# Patient Record
Sex: MALE | Race: WHITE | NOT HISPANIC OR LATINO | Employment: OTHER | URBAN - METROPOLITAN AREA
[De-identification: names, ages, dates, MRNs, and addresses within clinical notes are randomized per-mention and may not be internally consistent; named-entity substitution may affect disease eponyms.]

---

## 2018-02-13 DIAGNOSIS — N40.1 BENIGN PROSTATIC HYPERPLASIA WITH NOCTURIA: Primary | ICD-10-CM

## 2018-02-13 DIAGNOSIS — R35.1 BENIGN PROSTATIC HYPERPLASIA WITH NOCTURIA: Primary | ICD-10-CM

## 2018-02-19 ENCOUNTER — OFFICE VISIT (OUTPATIENT)
Dept: UROLOGY | Facility: MEDICAL CENTER | Age: 68
End: 2018-02-19
Payer: MEDICARE

## 2018-02-19 VITALS
BODY MASS INDEX: 36.45 KG/M2 | DIASTOLIC BLOOD PRESSURE: 82 MMHG | SYSTOLIC BLOOD PRESSURE: 140 MMHG | WEIGHT: 275 LBS | HEIGHT: 73 IN

## 2018-02-19 DIAGNOSIS — R35.1 BENIGN PROSTATIC HYPERPLASIA WITH NOCTURIA: Primary | ICD-10-CM

## 2018-02-19 DIAGNOSIS — N40.1 BENIGN PROSTATIC HYPERPLASIA WITH NOCTURIA: Primary | ICD-10-CM

## 2018-02-19 LAB
SL AMB  POCT GLUCOSE, UA: NEGATIVE
SL AMB LEUKOCYTE ESTERASE,UA: NEGATIVE
SL AMB POCT BILIRUBIN,UA: NEGATIVE
SL AMB POCT BLOOD,UA: NEGATIVE
SL AMB POCT CLARITY,UA: CLEAR
SL AMB POCT COLOR,UA: YELLOW
SL AMB POCT KETONES,UA: NEGATIVE
SL AMB POCT NITRITE,UA: NEGATIVE
SL AMB POCT PH,UA: 6
SL AMB POCT SPECIFIC GRAVITY,UA: 1.01
SL AMB POCT URINE PROTEIN: NEGATIVE
SL AMB POCT UROBILINOGEN: 0.2

## 2018-02-19 PROCEDURE — 81003 URINALYSIS AUTO W/O SCOPE: CPT | Performed by: UROLOGY

## 2018-02-19 PROCEDURE — 99214 OFFICE O/P EST MOD 30 MIN: CPT | Performed by: UROLOGY

## 2018-02-19 RX ORDER — PANTOPRAZOLE SODIUM 40 MG/1
TABLET, DELAYED RELEASE ORAL DAILY
COMMUNITY
Start: 2018-02-09 | End: 2020-06-04 | Stop reason: ALTCHOICE

## 2018-02-19 RX ORDER — FUROSEMIDE 20 MG/1
20 TABLET ORAL DAILY
COMMUNITY

## 2018-02-19 RX ORDER — SITAGLIPTIN 100 MG/1
TABLET, FILM COATED ORAL DAILY
COMMUNITY
Start: 2018-02-09 | End: 2020-06-04 | Stop reason: ALTCHOICE

## 2018-02-19 RX ORDER — METOPROLOL SUCCINATE 50 MG/1
TABLET, EXTENDED RELEASE ORAL DAILY
COMMUNITY
Start: 2018-02-09 | End: 2021-06-09 | Stop reason: ALTCHOICE

## 2018-02-19 RX ORDER — ASPIRIN 325 MG
325 TABLET ORAL
COMMUNITY
End: 2021-06-09 | Stop reason: ALTCHOICE

## 2018-02-19 RX ORDER — ATORVASTATIN CALCIUM 40 MG/1
TABLET, FILM COATED ORAL DAILY
COMMUNITY
Start: 2017-09-08

## 2018-02-19 NOTE — PROGRESS NOTES
Assessment/Plan:    Assessment:  1  Benign prostatic hyperplasia with nocturia    Plan:  1  Await PSA  2  Return in 1 year, PSA then    No problem-specific Assessment & Plan notes found for this encounter  Diagnoses and all orders for this visit:    Benign prostatic hyperplasia with nocturia  -     POCT urine dip auto non-scope  -     PSA; Future    Other orders  -     pantoprazole (PROTONIX) 40 mg tablet; daily  -     JANUVIA 100 MG tablet; daily  -     metFORMIN (GLUCOPHAGE) 500 mg tablet; 2 (two) times a day  -     OLMESARTAN-AMLODIPINE-HCTZ PO; Take by mouth daily  -     furosemide (LASIX) 20 mg tablet; Take 20 mg by mouth daily  -     metoprolol succinate (TOPROL-XL) 50 mg 24 hr tablet; daily  -     atorvastatin (LIPITOR) 40 mg tablet; daily  -     ADVAIR DISKUS 250-50 MCG/DOSE inhaler; daily  -     ipratropium-albuterol (COMBIVENT RESPIMAT) inhaler; as needed  -     aspirin 325 mg tablet; Take 325 mg by mouth          Subjective:      Patient ID: Susan Cano is a 75 y o  male  HPI      He notes urinary frequency, nocturia x 3 He denies other significant urinary symptoms  He denies gross hematuria, urinary tract infections or incontinence  He is taking no prescription medications for his symptoms  Nocturia got much better within the past year, then reverted to three times per night  Non insulin dependent diabetes without apparent effect on the urinary tract  The following portions of the patient's history were reviewed and updated as appropriate: allergies, current medications, past family history, past medical history, past social history, past surgical history and problem list     Review of Systems   Constitutional: Negative for activity change and fatigue  Diabetic  Weight gain noted  Respiratory: Negative for shortness of breath and wheezing  Cardiovascular: Negative for chest pain  Gastrointestinal: Negative for abdominal pain     Genitourinary: Negative for difficulty urinating, dysuria, frequency, hematuria and urgency  Musculoskeletal: Negative for back pain and gait problem  Skin: Negative  Allergic/Immunologic: Negative  Neurological: Negative  Psychiatric/Behavioral: Negative  Objective:      /82 (BP Location: Left arm, Patient Position: Sitting, Cuff Size: Standard)   Ht 6' 0 5" (1 842 m)   Wt 125 kg (275 lb)   BMI 36 78 kg/m²          Physical Exam   Constitutional: He is oriented to person, place, and time  He appears well-developed and well-nourished  HENT:   Head: Normocephalic and atraumatic  Pulmonary/Chest: Effort normal    Abdominal: Soft  Bowel sounds are normal    Genitourinary:   Genitourinary Comments: The prostate is approximately 30-40 g in size  Exam was difficult due to a tight anal sphincter and the size of the patient's buttocks  Anal sphincter tone is hyperactive  Perineal structures are normal    Musculoskeletal: Normal range of motion  Neurological: He is alert and oriented to person, place, and time  Skin: Skin is warm and dry  Psychiatric: He has a normal mood and affect   His behavior is normal  Judgment and thought content normal

## 2018-02-19 NOTE — PROGRESS NOTES
IPSS Questionnaire (AUA-7): Over the past month    1)  How often have you had a sensation of not emptying your bladder completely after you finish urinating? 0 - Not at all   2)  How often have you had to urinate again less than two hours after you finished urinating? 0 - Not at all   3)  How often have you found you stopped and started again several times when you urinated? 0 - Not at all   4) How difficult have you found it to postpone urination? 2 - Less than half the time   5) How often have you had a weak urinary stream?  3 - About half the time   6) How often have you had to push or strain to begin urination? 0 - Not at all   7) How many times did you most typically get up to urinate from the time you went to bed until the time you got up in the morning?   3 - 3 times   Total Score:  8

## 2018-02-19 NOTE — LETTER
February 19, 2018     MD EDGAR [facility] Calls  [address]  [facility] [address]    Patient: Katherine Anderson   YOB: 1950   Date of Visit: 2/19/2018       Dear Dr Ivan Horner: Thank you for referring Oxana Fox to me for evaluation  Below are my notes for this consultation  If you have questions, please do not hesitate to call me  I look forward to following your patient along with you  Sincerely,        John Weber MD        CC: No Recipients  John Weber MD  2/19/2018 10:29 AM  Sign at close encounter  Assessment/Plan:    Assessment:  1  Benign prostatic hyperplasia with nocturia    Plan:  1  Await PSA  2  Return in 1 year, PSA then    No problem-specific Assessment & Plan notes found for this encounter  Diagnoses and all orders for this visit:    Benign prostatic hyperplasia with nocturia  -     POCT urine dip auto non-scope  -     PSA; Future    Other orders  -     pantoprazole (PROTONIX) 40 mg tablet; daily  -     JANUVIA 100 MG tablet; daily  -     metFORMIN (GLUCOPHAGE) 500 mg tablet; 2 (two) times a day  -     OLMESARTAN-AMLODIPINE-HCTZ PO; Take by mouth daily  -     furosemide (LASIX) 20 mg tablet; Take 20 mg by mouth daily  -     metoprolol succinate (TOPROL-XL) 50 mg 24 hr tablet; daily  -     atorvastatin (LIPITOR) 40 mg tablet; daily  -     ADVAIR DISKUS 250-50 MCG/DOSE inhaler; daily  -     ipratropium-albuterol (COMBIVENT RESPIMAT) inhaler; as needed  -     aspirin 325 mg tablet; Take 325 mg by mouth          Subjective:      Patient ID: Katherine Anderson is a 75 y o  male  HPI      He notes urinary frequency, nocturia x 3 He denies other significant urinary symptoms  He denies gross hematuria, urinary tract infections or incontinence  He is taking no prescription medications for his symptoms  Nocturia got much better within the past year, then reverted to three times per night           Non insulin dependent diabetes without apparent effect on the urinary tract  The following portions of the patient's history were reviewed and updated as appropriate: allergies, current medications, past family history, past medical history, past social history, past surgical history and problem list     Review of Systems   Constitutional: Negative for activity change and fatigue  Diabetic  Weight gain noted  Respiratory: Negative for shortness of breath and wheezing  Cardiovascular: Negative for chest pain  Gastrointestinal: Negative for abdominal pain  Genitourinary: Negative for difficulty urinating, dysuria, frequency, hematuria and urgency  Musculoskeletal: Negative for back pain and gait problem  Skin: Negative  Allergic/Immunologic: Negative  Neurological: Negative  Psychiatric/Behavioral: Negative  Objective:      /82 (BP Location: Left arm, Patient Position: Sitting, Cuff Size: Standard)   Ht 6' 0 5" (1 842 m)   Wt 125 kg (275 lb)   BMI 36 78 kg/m²           Physical Exam   Constitutional: He is oriented to person, place, and time  He appears well-developed and well-nourished  HENT:   Head: Normocephalic and atraumatic  Pulmonary/Chest: Effort normal    Abdominal: Soft  Bowel sounds are normal    Genitourinary:   Genitourinary Comments: The prostate is approximately 30-40 g in size  Exam was difficult due to a tight anal sphincter and the size of the patient's buttocks  Anal sphincter tone is hyperactive  Perineal structures are normal    Musculoskeletal: Normal range of motion  Neurological: He is alert and oriented to person, place, and time  Skin: Skin is warm and dry  Psychiatric: He has a normal mood and affect   His behavior is normal  Judgment and thought content normal

## 2019-02-25 DIAGNOSIS — N40.1 BENIGN PROSTATIC HYPERPLASIA WITH NOCTURIA: Primary | ICD-10-CM

## 2019-02-25 DIAGNOSIS — R35.1 BENIGN PROSTATIC HYPERPLASIA WITH NOCTURIA: Primary | ICD-10-CM

## 2019-03-01 RX ORDER — NICOTINE POLACRILEX 4 MG/1
20 GUM, CHEWING ORAL
COMMUNITY
End: 2021-06-09 | Stop reason: ALTCHOICE

## 2019-03-01 RX ORDER — ALLOPURINOL 100 MG/1
TABLET ORAL
COMMUNITY
Start: 2017-04-26 | End: 2019-03-14

## 2019-03-14 ENCOUNTER — OFFICE VISIT (OUTPATIENT)
Dept: UROLOGY | Facility: MEDICAL CENTER | Age: 69
End: 2019-03-14
Payer: MEDICARE

## 2019-03-14 VITALS
HEART RATE: 55 BPM | HEIGHT: 73 IN | BODY MASS INDEX: 36.92 KG/M2 | WEIGHT: 278.6 LBS | DIASTOLIC BLOOD PRESSURE: 86 MMHG | SYSTOLIC BLOOD PRESSURE: 158 MMHG

## 2019-03-14 DIAGNOSIS — R35.1 BENIGN PROSTATIC HYPERPLASIA WITH NOCTURIA: Primary | ICD-10-CM

## 2019-03-14 DIAGNOSIS — N40.1 BENIGN PROSTATIC HYPERPLASIA WITH NOCTURIA: Primary | ICD-10-CM

## 2019-03-14 LAB
SL AMB  POCT GLUCOSE, UA: NEGATIVE
SL AMB LEUKOCYTE ESTERASE,UA: NEGATIVE
SL AMB POCT BILIRUBIN,UA: NEGATIVE
SL AMB POCT BLOOD,UA: NEGATIVE
SL AMB POCT CLARITY,UA: CLEAR
SL AMB POCT COLOR,UA: YELLOW
SL AMB POCT KETONES,UA: NEGATIVE
SL AMB POCT NITRITE,UA: NEGATIVE
SL AMB POCT PH,UA: 6.5
SL AMB POCT SPECIFIC GRAVITY,UA: 1.01
SL AMB POCT URINE PROTEIN: NEGATIVE
SL AMB POCT UROBILINOGEN: 1

## 2019-03-14 PROCEDURE — 99214 OFFICE O/P EST MOD 30 MIN: CPT | Performed by: UROLOGY

## 2019-03-14 PROCEDURE — 81003 URINALYSIS AUTO W/O SCOPE: CPT | Performed by: UROLOGY

## 2019-03-14 NOTE — PROGRESS NOTES
Assessment/Plan:    Benign prostatic hyperplasia with nocturia  Mildly symptomatic  Return in 1 year  Diagnoses and all orders for this visit:    Benign prostatic hyperplasia with nocturia  -     POCT urine dip auto non-scope  -     PSA, Total Screen; Future    Other orders  -     multivitamin-minerals (CENTRUM) tablet; Take 1 tablet by mouth daily          Subjective:      Patient ID: Vernida Nageotte is a 70 y o  male  HPI  BPH:  He notes incomplete emptying  He denies other significant urinary symptoms  He denies gross hematuria, urinary tract infections or incontinence  He is taking neither medications nor supplements for his symptoms  For the last few dyas, stream has been weaker  PSA:  0 7 on February 27, 2019  AUA SYMPTOM SCORE      Most Recent Value   AUA SYMPTOM SCORE   How often have you had a sensation of not emptying your bladder completely after you finished urinating? 5   How often have you had to urinate again less than two hours after you finished urinating? 1   How often have you found you stopped and started again several times when you urinate?  0   How often have you found it difficult to postpone urination? 0   How often have you had a weak urinary stream?  1   How often have you had to push or strain to begin urination? 0   How many times did you most typically get up to urinate from the time you went to bed at night until the time you got up in the morning?  0   Quality of Life: If you were to spend the rest of your life with your urinary condition just the way it is now, how would you feel about that?  1   AUA SYMPTOM SCORE  7          The following portions of the patient's history were reviewed and updated as appropriate: allergies, current medications, past family history, past medical history, past social history, past surgical history and problem list     Review of Systems   Constitutional: Negative for activity change and fatigue     Respiratory: Negative for shortness of breath and wheezing  Asthma on inhalers daily  Cardiovascular: Negative for chest pain  Hypertension  Gastrointestinal: Negative for abdominal pain  Endocrine:        NIDDM  Control has been good recently  Checks sugars 3-4 times per day  Genitourinary: Negative for difficulty urinating, dysuria, frequency, hematuria and urgency  Musculoskeletal: Negative for back pain and gait problem  Skin: Negative  Allergic/Immunologic: Negative  Neurological: Negative  Psychiatric/Behavioral: Negative  Objective:      /86 (BP Location: Left arm, Patient Position: Sitting, Cuff Size: Standard)   Pulse 55   Ht 6' 0 5" (1 842 m)   Wt 126 kg (278 lb 9 6 oz)   BMI 37 27 kg/m²          Physical Exam   Constitutional: He is oriented to person, place, and time  He appears well-developed and well-nourished  HENT:   Head: Normocephalic and atraumatic  Eyes: EOM are normal    Neck: Normal range of motion  Neck supple  Pulmonary/Chest: Effort normal    Genitourinary: Rectum normal    Genitourinary Comments: The prostate is 30 gm, firm, smooth, non-tender  Pt had difficulty relaxing buttocks, limiting exam     Musculoskeletal: Normal range of motion  Neurological: He is alert and oriented to person, place, and time  Skin: Skin is warm and dry  Psychiatric: He has a normal mood and affect   His behavior is normal  Judgment and thought content normal

## 2019-06-12 ENCOUNTER — HOSPITAL ENCOUNTER (INPATIENT)
Facility: HOSPITAL | Age: 69
LOS: 4 days | Discharge: HOME | DRG: 603 | End: 2019-06-16
Attending: PODIATRIST | Admitting: PODIATRIST
Payer: MEDICARE

## 2019-06-12 DIAGNOSIS — L03.116 CELLULITIS OF LEFT LOWER EXTREMITY: Primary | ICD-10-CM

## 2019-06-12 DIAGNOSIS — E11.9 TYPE 2 DIABETES MELLITUS WITHOUT COMPLICATION, WITHOUT LONG-TERM CURRENT USE OF INSULIN (HCC): ICD-10-CM

## 2019-06-12 PROBLEM — E11.610 CHARCOT FOOT DUE TO DIABETES MELLITUS (HCC): Status: ACTIVE | Noted: 2019-06-12

## 2019-06-12 PROBLEM — K21.9 GERD (GASTROESOPHAGEAL REFLUX DISEASE): Status: ACTIVE | Noted: 2019-06-12

## 2019-06-12 PROBLEM — E78.5 HLD (HYPERLIPIDEMIA): Status: ACTIVE | Noted: 2019-06-12

## 2019-06-12 PROBLEM — I10 HTN (HYPERTENSION): Status: ACTIVE | Noted: 2019-06-12

## 2019-06-12 PROBLEM — M10.9 GOUT INVOLVING TOE OF LEFT FOOT: Status: ACTIVE | Noted: 2019-06-12

## 2019-06-12 LAB
ALBUMIN SERPL BCP-MCNC: 4 G/DL (ref 3.5–5)
ALP SERPL-CCNC: 101 U/L (ref 46–116)
ALT SERPL W P-5'-P-CCNC: 30 U/L (ref 12–78)
ANION GAP SERPL CALCULATED.3IONS-SCNC: 13 MMOL/L (ref 4–13)
AST SERPL W P-5'-P-CCNC: 19 U/L (ref 5–45)
BASOPHILS # BLD AUTO: 0.05 THOUSANDS/ΜL (ref 0–0.1)
BASOPHILS NFR BLD AUTO: 0 % (ref 0–1)
BILIRUB SERPL-MCNC: 0.8 MG/DL (ref 0.2–1)
BUN SERPL-MCNC: 16 MG/DL (ref 5–25)
CALCIUM SERPL-MCNC: 9.5 MG/DL (ref 8.3–10.1)
CHLORIDE SERPL-SCNC: 104 MMOL/L (ref 100–108)
CO2 SERPL-SCNC: 28 MMOL/L (ref 21–32)
CREAT SERPL-MCNC: 1.16 MG/DL (ref 0.6–1.3)
CRP SERPL QL: 7.3 MG/L
EOSINOPHIL # BLD AUTO: 0.11 THOUSAND/ΜL (ref 0–0.61)
EOSINOPHIL NFR BLD AUTO: 1 % (ref 0–6)
ERYTHROCYTE [DISTWIDTH] IN BLOOD BY AUTOMATED COUNT: 13 % (ref 11.6–15.1)
ERYTHROCYTE [SEDIMENTATION RATE] IN BLOOD: 12 MM/HOUR (ref 0–10)
GFR SERPL CREATININE-BSD FRML MDRD: 64 ML/MIN/1.73SQ M
GLUCOSE SERPL-MCNC: 116 MG/DL (ref 65–140)
GLUCOSE SERPL-MCNC: 138 MG/DL (ref 65–140)
GLUCOSE SERPL-MCNC: 140 MG/DL (ref 65–140)
GLUCOSE SERPL-MCNC: 146 MG/DL (ref 65–140)
HCT VFR BLD AUTO: 42.1 % (ref 36.5–49.3)
HGB BLD-MCNC: 14.2 G/DL (ref 12–17)
IMM GRANULOCYTES # BLD AUTO: 0.05 THOUSAND/UL (ref 0–0.2)
IMM GRANULOCYTES NFR BLD AUTO: 0 % (ref 0–2)
LYMPHOCYTES # BLD AUTO: 4.41 THOUSANDS/ΜL (ref 0.6–4.47)
LYMPHOCYTES NFR BLD AUTO: 33 % (ref 14–44)
MCH RBC QN AUTO: 30 PG (ref 26.8–34.3)
MCHC RBC AUTO-ENTMCNC: 33.7 G/DL (ref 31.4–37.4)
MCV RBC AUTO: 89 FL (ref 82–98)
MONOCYTES # BLD AUTO: 0.78 THOUSAND/ΜL (ref 0.17–1.22)
MONOCYTES NFR BLD AUTO: 6 % (ref 4–12)
NEUTROPHILS # BLD AUTO: 7.79 THOUSANDS/ΜL (ref 1.85–7.62)
NEUTS SEG NFR BLD AUTO: 60 % (ref 43–75)
NRBC BLD AUTO-RTO: 0 /100 WBCS
PLATELET # BLD AUTO: 244 THOUSANDS/UL (ref 149–390)
PMV BLD AUTO: 11 FL (ref 8.9–12.7)
POTASSIUM SERPL-SCNC: 3.7 MMOL/L (ref 3.5–5.3)
PROT SERPL-MCNC: 7.4 G/DL (ref 6.4–8.2)
RBC # BLD AUTO: 4.74 MILLION/UL (ref 3.88–5.62)
SODIUM SERPL-SCNC: 145 MMOL/L (ref 136–145)
URATE SERPL-MCNC: 7.5 MG/DL (ref 4.2–8)
WBC # BLD AUTO: 13.19 THOUSAND/UL (ref 4.31–10.16)

## 2019-06-12 PROCEDURE — 86140 C-REACTIVE PROTEIN: CPT | Performed by: PODIATRIST

## 2019-06-12 PROCEDURE — 80053 COMPREHEN METABOLIC PANEL: CPT | Performed by: PODIATRIST

## 2019-06-12 PROCEDURE — 85652 RBC SED RATE AUTOMATED: CPT | Performed by: PODIATRIST

## 2019-06-12 PROCEDURE — 82948 REAGENT STRIP/BLOOD GLUCOSE: CPT

## 2019-06-12 PROCEDURE — 83036 HEMOGLOBIN GLYCOSYLATED A1C: CPT | Performed by: NURSE PRACTITIONER

## 2019-06-12 PROCEDURE — 84550 ASSAY OF BLOOD/URIC ACID: CPT | Performed by: PODIATRIST

## 2019-06-12 PROCEDURE — 85025 COMPLETE CBC W/AUTO DIFF WBC: CPT | Performed by: PODIATRIST

## 2019-06-12 PROCEDURE — 87040 BLOOD CULTURE FOR BACTERIA: CPT | Performed by: PODIATRIST

## 2019-06-12 RX ORDER — FUROSEMIDE 20 MG/1
20 TABLET ORAL DAILY
Status: DISCONTINUED | OUTPATIENT
Start: 2019-06-12 | End: 2019-06-16 | Stop reason: HOSPADM

## 2019-06-12 RX ORDER — METOPROLOL SUCCINATE 50 MG/1
50 TABLET, EXTENDED RELEASE ORAL DAILY
Status: DISCONTINUED | OUTPATIENT
Start: 2019-06-12 | End: 2019-06-16 | Stop reason: HOSPADM

## 2019-06-12 RX ORDER — AMLODIPINE AND VALSARTAN 10; 160 MG/1; MG/1
1 TABLET ORAL DAILY
Status: DISCONTINUED | OUTPATIENT
Start: 2019-06-12 | End: 2019-06-12 | Stop reason: CLARIF

## 2019-06-12 RX ORDER — ATORVASTATIN CALCIUM 40 MG/1
40 TABLET, FILM COATED ORAL
Status: DISCONTINUED | OUTPATIENT
Start: 2019-06-12 | End: 2019-06-16 | Stop reason: HOSPADM

## 2019-06-12 RX ORDER — CEFAZOLIN SODIUM 2 G/50ML
2000 SOLUTION INTRAVENOUS EVERY 8 HOURS
Status: DISCONTINUED | OUTPATIENT
Start: 2019-06-12 | End: 2019-06-16 | Stop reason: HOSPADM

## 2019-06-12 RX ORDER — METRONIDAZOLE 500 MG/1
500 TABLET ORAL EVERY 8 HOURS SCHEDULED
Status: DISCONTINUED | OUTPATIENT
Start: 2019-06-12 | End: 2019-06-13

## 2019-06-12 RX ORDER — IPRATROPIUM BROMIDE AND ALBUTEROL SULFATE 2.5; .5 MG/3ML; MG/3ML
3 SOLUTION RESPIRATORY (INHALATION) EVERY 4 HOURS PRN
Status: DISCONTINUED | OUTPATIENT
Start: 2019-06-12 | End: 2019-06-16 | Stop reason: HOSPADM

## 2019-06-12 RX ORDER — PANTOPRAZOLE SODIUM 40 MG/1
40 TABLET, DELAYED RELEASE ORAL
Status: DISCONTINUED | OUTPATIENT
Start: 2019-06-13 | End: 2019-06-16 | Stop reason: HOSPADM

## 2019-06-12 RX ORDER — AMLODIPINE BESYLATE 10 MG/1
10 TABLET ORAL DAILY
Status: DISCONTINUED | OUTPATIENT
Start: 2019-06-12 | End: 2019-06-16 | Stop reason: HOSPADM

## 2019-06-12 RX ORDER — ACETAMINOPHEN 325 MG/1
650 TABLET ORAL EVERY 6 HOURS PRN
Status: DISCONTINUED | OUTPATIENT
Start: 2019-06-12 | End: 2019-06-16 | Stop reason: HOSPADM

## 2019-06-12 RX ORDER — PANTOPRAZOLE SODIUM 20 MG/1
20 TABLET, DELAYED RELEASE ORAL
Status: DISCONTINUED | OUTPATIENT
Start: 2019-06-12 | End: 2019-06-12

## 2019-06-12 RX ORDER — LOSARTAN POTASSIUM 50 MG/1
100 TABLET ORAL DAILY
Status: DISCONTINUED | OUTPATIENT
Start: 2019-06-12 | End: 2019-06-16 | Stop reason: HOSPADM

## 2019-06-12 RX ORDER — FLUTICASONE FUROATE AND VILANTEROL 100; 25 UG/1; UG/1
1 POWDER RESPIRATORY (INHALATION)
Status: DISCONTINUED | OUTPATIENT
Start: 2019-06-12 | End: 2019-06-16 | Stop reason: HOSPADM

## 2019-06-12 RX ADMIN — METOPROLOL SUCCINATE 50 MG: 50 TABLET, EXTENDED RELEASE ORAL at 17:55

## 2019-06-12 RX ADMIN — CEFAZOLIN SODIUM 2000 MG: 2 SOLUTION INTRAVENOUS at 12:54

## 2019-06-12 RX ADMIN — METRONIDAZOLE 500 MG: 500 TABLET, FILM COATED ORAL at 14:54

## 2019-06-12 RX ADMIN — METRONIDAZOLE 500 MG: 500 TABLET, FILM COATED ORAL at 22:10

## 2019-06-12 RX ADMIN — CEFAZOLIN SODIUM 2000 MG: 2 SOLUTION INTRAVENOUS at 20:14

## 2019-06-12 RX ADMIN — ATORVASTATIN CALCIUM 40 MG: 40 TABLET, FILM COATED ORAL at 17:55

## 2019-06-13 PROBLEM — J45.20 MILD INTERMITTENT ASTHMA WITHOUT COMPLICATION: Status: ACTIVE | Noted: 2019-06-13

## 2019-06-13 PROBLEM — I25.10 CORONARY ARTERY DISEASE INVOLVING NATIVE CORONARY ARTERY WITHOUT ANGINA PECTORIS: Status: ACTIVE | Noted: 2019-06-13

## 2019-06-13 LAB
EST. AVERAGE GLUCOSE BLD GHB EST-MCNC: 134 MG/DL
GLUCOSE SERPL-MCNC: 107 MG/DL (ref 65–140)
GLUCOSE SERPL-MCNC: 140 MG/DL (ref 65–140)
GLUCOSE SERPL-MCNC: 144 MG/DL (ref 65–140)
GLUCOSE SERPL-MCNC: 165 MG/DL (ref 65–140)
GLUCOSE SERPL-MCNC: 187 MG/DL (ref 65–140)
GLUCOSE SERPL-MCNC: 191 MG/DL (ref 65–140)
HBA1C MFR BLD: 6.3 % (ref 4.2–6.3)

## 2019-06-13 PROCEDURE — 99222 1ST HOSP IP/OBS MODERATE 55: CPT | Performed by: INTERNAL MEDICINE

## 2019-06-13 PROCEDURE — 99221 1ST HOSP IP/OBS SF/LOW 40: CPT | Performed by: FAMILY MEDICINE

## 2019-06-13 PROCEDURE — 82948 REAGENT STRIP/BLOOD GLUCOSE: CPT

## 2019-06-13 RX ORDER — ASPIRIN 325 MG
325 TABLET ORAL DAILY
Status: DISCONTINUED | OUTPATIENT
Start: 2019-06-13 | End: 2019-06-16 | Stop reason: HOSPADM

## 2019-06-13 RX ADMIN — FUROSEMIDE 20 MG: 20 TABLET ORAL at 09:52

## 2019-06-13 RX ADMIN — METOPROLOL SUCCINATE 50 MG: 50 TABLET, EXTENDED RELEASE ORAL at 17:34

## 2019-06-13 RX ADMIN — PANTOPRAZOLE SODIUM 40 MG: 40 TABLET, DELAYED RELEASE ORAL at 06:15

## 2019-06-13 RX ADMIN — CEFAZOLIN SODIUM 2000 MG: 2 SOLUTION INTRAVENOUS at 11:56

## 2019-06-13 RX ADMIN — CEFAZOLIN SODIUM 2000 MG: 2 SOLUTION INTRAVENOUS at 04:20

## 2019-06-13 RX ADMIN — ATORVASTATIN CALCIUM 40 MG: 40 TABLET, FILM COATED ORAL at 17:34

## 2019-06-13 RX ADMIN — METFORMIN HYDROCHLORIDE 500 MG: 500 TABLET ORAL at 17:34

## 2019-06-13 RX ADMIN — FLUTICASONE FUROATE AND VILANTEROL TRIFENATATE 1 PUFF: 100; 25 POWDER RESPIRATORY (INHALATION) at 09:52

## 2019-06-13 RX ADMIN — AMLODIPINE BESYLATE 10 MG: 10 TABLET ORAL at 09:52

## 2019-06-13 RX ADMIN — LOSARTAN POTASSIUM 100 MG: 50 TABLET, FILM COATED ORAL at 09:52

## 2019-06-13 RX ADMIN — INSULIN LISPRO 2 UNITS: 100 INJECTION, SOLUTION INTRAVENOUS; SUBCUTANEOUS at 12:01

## 2019-06-13 RX ADMIN — ASPIRIN 325 MG ORAL TABLET 325 MG: 325 PILL ORAL at 11:59

## 2019-06-13 RX ADMIN — CEFAZOLIN SODIUM 2000 MG: 2 SOLUTION INTRAVENOUS at 20:02

## 2019-06-13 RX ADMIN — Medication 1 TABLET: at 09:52

## 2019-06-13 RX ADMIN — METRONIDAZOLE 500 MG: 500 TABLET, FILM COATED ORAL at 06:15

## 2019-06-14 LAB
ANION GAP SERPL CALCULATED.3IONS-SCNC: 14 MMOL/L (ref 4–13)
BASOPHILS # BLD AUTO: 0.04 THOUSANDS/ΜL (ref 0–0.1)
BASOPHILS NFR BLD AUTO: 0 % (ref 0–1)
BUN SERPL-MCNC: 18 MG/DL (ref 5–25)
CALCIUM SERPL-MCNC: 9.1 MG/DL (ref 8.3–10.1)
CHLORIDE SERPL-SCNC: 105 MMOL/L (ref 100–108)
CO2 SERPL-SCNC: 25 MMOL/L (ref 21–32)
CREAT SERPL-MCNC: 1.2 MG/DL (ref 0.6–1.3)
EOSINOPHIL # BLD AUTO: 0.11 THOUSAND/ΜL (ref 0–0.61)
EOSINOPHIL NFR BLD AUTO: 1 % (ref 0–6)
ERYTHROCYTE [DISTWIDTH] IN BLOOD BY AUTOMATED COUNT: 13.1 % (ref 11.6–15.1)
GFR SERPL CREATININE-BSD FRML MDRD: 61 ML/MIN/1.73SQ M
GLUCOSE SERPL-MCNC: 118 MG/DL (ref 65–140)
GLUCOSE SERPL-MCNC: 133 MG/DL (ref 65–140)
GLUCOSE SERPL-MCNC: 134 MG/DL (ref 65–140)
GLUCOSE SERPL-MCNC: 155 MG/DL (ref 65–140)
GLUCOSE SERPL-MCNC: 222 MG/DL (ref 65–140)
HCT VFR BLD AUTO: 41.1 % (ref 36.5–49.3)
HGB BLD-MCNC: 14 G/DL (ref 12–17)
IMM GRANULOCYTES # BLD AUTO: 0.05 THOUSAND/UL (ref 0–0.2)
IMM GRANULOCYTES NFR BLD AUTO: 1 % (ref 0–2)
LYMPHOCYTES # BLD AUTO: 3.84 THOUSANDS/ΜL (ref 0.6–4.47)
LYMPHOCYTES NFR BLD AUTO: 37 % (ref 14–44)
MCH RBC QN AUTO: 30.5 PG (ref 26.8–34.3)
MCHC RBC AUTO-ENTMCNC: 34.1 G/DL (ref 31.4–37.4)
MCV RBC AUTO: 90 FL (ref 82–98)
MONOCYTES # BLD AUTO: 0.71 THOUSAND/ΜL (ref 0.17–1.22)
MONOCYTES NFR BLD AUTO: 7 % (ref 4–12)
NEUTROPHILS # BLD AUTO: 5.68 THOUSANDS/ΜL (ref 1.85–7.62)
NEUTS SEG NFR BLD AUTO: 54 % (ref 43–75)
NRBC BLD AUTO-RTO: 0 /100 WBCS
PLATELET # BLD AUTO: 217 THOUSANDS/UL (ref 149–390)
PMV BLD AUTO: 10.8 FL (ref 8.9–12.7)
POTASSIUM SERPL-SCNC: 3.3 MMOL/L (ref 3.5–5.3)
RBC # BLD AUTO: 4.59 MILLION/UL (ref 3.88–5.62)
SODIUM SERPL-SCNC: 144 MMOL/L (ref 136–145)
WBC # BLD AUTO: 10.43 THOUSAND/UL (ref 4.31–10.16)

## 2019-06-14 PROCEDURE — 82948 REAGENT STRIP/BLOOD GLUCOSE: CPT

## 2019-06-14 PROCEDURE — 80048 BASIC METABOLIC PNL TOTAL CA: CPT | Performed by: NURSE PRACTITIONER

## 2019-06-14 PROCEDURE — 85025 COMPLETE CBC W/AUTO DIFF WBC: CPT | Performed by: NURSE PRACTITIONER

## 2019-06-14 PROCEDURE — 99232 SBSQ HOSP IP/OBS MODERATE 35: CPT | Performed by: INTERNAL MEDICINE

## 2019-06-14 RX ADMIN — INSULIN LISPRO 1 UNITS: 100 INJECTION, SOLUTION INTRAVENOUS; SUBCUTANEOUS at 21:41

## 2019-06-14 RX ADMIN — Medication 1 TABLET: at 08:05

## 2019-06-14 RX ADMIN — ATORVASTATIN CALCIUM 40 MG: 40 TABLET, FILM COATED ORAL at 17:42

## 2019-06-14 RX ADMIN — PANTOPRAZOLE SODIUM 40 MG: 40 TABLET, DELAYED RELEASE ORAL at 06:13

## 2019-06-14 RX ADMIN — METFORMIN HYDROCHLORIDE 500 MG: 500 TABLET ORAL at 17:42

## 2019-06-14 RX ADMIN — ASPIRIN 325 MG ORAL TABLET 325 MG: 325 PILL ORAL at 08:05

## 2019-06-14 RX ADMIN — AMLODIPINE BESYLATE 10 MG: 10 TABLET ORAL at 08:05

## 2019-06-14 RX ADMIN — INSULIN LISPRO 4 UNITS: 100 INJECTION, SOLUTION INTRAVENOUS; SUBCUTANEOUS at 11:42

## 2019-06-14 RX ADMIN — FUROSEMIDE 20 MG: 20 TABLET ORAL at 08:05

## 2019-06-14 RX ADMIN — METOPROLOL SUCCINATE 50 MG: 50 TABLET, EXTENDED RELEASE ORAL at 17:43

## 2019-06-14 RX ADMIN — LOSARTAN POTASSIUM 100 MG: 50 TABLET, FILM COATED ORAL at 08:05

## 2019-06-14 RX ADMIN — FLUTICASONE FUROATE AND VILANTEROL TRIFENATATE 1 PUFF: 100; 25 POWDER RESPIRATORY (INHALATION) at 08:05

## 2019-06-14 RX ADMIN — CEFAZOLIN SODIUM 2000 MG: 2 SOLUTION INTRAVENOUS at 04:36

## 2019-06-14 RX ADMIN — CEFAZOLIN SODIUM 2000 MG: 2 SOLUTION INTRAVENOUS at 11:42

## 2019-06-14 RX ADMIN — CEFAZOLIN SODIUM 2000 MG: 2 SOLUTION INTRAVENOUS at 20:27

## 2019-06-14 RX ADMIN — METFORMIN HYDROCHLORIDE 500 MG: 500 TABLET ORAL at 08:05

## 2019-06-15 LAB
ERYTHROCYTE [DISTWIDTH] IN BLOOD BY AUTOMATED COUNT: 13.1 % (ref 11.6–15.1)
GLUCOSE SERPL-MCNC: 118 MG/DL (ref 65–140)
GLUCOSE SERPL-MCNC: 132 MG/DL (ref 65–140)
GLUCOSE SERPL-MCNC: 177 MG/DL (ref 65–140)
GLUCOSE SERPL-MCNC: 183 MG/DL (ref 65–140)
HCT VFR BLD AUTO: 39.4 % (ref 36.5–49.3)
HGB BLD-MCNC: 13.3 G/DL (ref 12–17)
MCH RBC QN AUTO: 30.2 PG (ref 26.8–34.3)
MCHC RBC AUTO-ENTMCNC: 33.8 G/DL (ref 31.4–37.4)
MCV RBC AUTO: 90 FL (ref 82–98)
PLATELET # BLD AUTO: 236 THOUSANDS/UL (ref 149–390)
PMV BLD AUTO: 11 FL (ref 8.9–12.7)
RBC # BLD AUTO: 4.4 MILLION/UL (ref 3.88–5.62)
WBC # BLD AUTO: 10.83 THOUSAND/UL (ref 4.31–10.16)

## 2019-06-15 PROCEDURE — 82948 REAGENT STRIP/BLOOD GLUCOSE: CPT

## 2019-06-15 PROCEDURE — 85027 COMPLETE CBC AUTOMATED: CPT | Performed by: STUDENT IN AN ORGANIZED HEALTH CARE EDUCATION/TRAINING PROGRAM

## 2019-06-15 PROCEDURE — 99232 SBSQ HOSP IP/OBS MODERATE 35: CPT | Performed by: INTERNAL MEDICINE

## 2019-06-15 RX ADMIN — ATORVASTATIN CALCIUM 40 MG: 40 TABLET, FILM COATED ORAL at 16:34

## 2019-06-15 RX ADMIN — CEFAZOLIN SODIUM 2000 MG: 2 SOLUTION INTRAVENOUS at 19:57

## 2019-06-15 RX ADMIN — INSULIN LISPRO 2 UNITS: 100 INJECTION, SOLUTION INTRAVENOUS; SUBCUTANEOUS at 11:16

## 2019-06-15 RX ADMIN — FUROSEMIDE 20 MG: 20 TABLET ORAL at 08:37

## 2019-06-15 RX ADMIN — AMLODIPINE BESYLATE 10 MG: 10 TABLET ORAL at 08:37

## 2019-06-15 RX ADMIN — INSULIN LISPRO 1 UNITS: 100 INJECTION, SOLUTION INTRAVENOUS; SUBCUTANEOUS at 21:09

## 2019-06-15 RX ADMIN — PANTOPRAZOLE SODIUM 40 MG: 40 TABLET, DELAYED RELEASE ORAL at 05:02

## 2019-06-15 RX ADMIN — METFORMIN HYDROCHLORIDE 500 MG: 500 TABLET ORAL at 16:34

## 2019-06-15 RX ADMIN — CEFAZOLIN SODIUM 2000 MG: 2 SOLUTION INTRAVENOUS at 04:59

## 2019-06-15 RX ADMIN — METOPROLOL SUCCINATE 50 MG: 50 TABLET, EXTENDED RELEASE ORAL at 16:34

## 2019-06-15 RX ADMIN — LOSARTAN POTASSIUM 100 MG: 50 TABLET, FILM COATED ORAL at 08:37

## 2019-06-15 RX ADMIN — FLUTICASONE FUROATE AND VILANTEROL TRIFENATATE 1 PUFF: 100; 25 POWDER RESPIRATORY (INHALATION) at 08:37

## 2019-06-15 RX ADMIN — CEFAZOLIN SODIUM 2000 MG: 2 SOLUTION INTRAVENOUS at 11:15

## 2019-06-15 RX ADMIN — METFORMIN HYDROCHLORIDE 500 MG: 500 TABLET ORAL at 08:37

## 2019-06-15 RX ADMIN — Medication 1 TABLET: at 08:37

## 2019-06-15 RX ADMIN — ASPIRIN 325 MG ORAL TABLET 325 MG: 325 PILL ORAL at 08:37

## 2019-06-16 VITALS
TEMPERATURE: 98.2 F | SYSTOLIC BLOOD PRESSURE: 130 MMHG | DIASTOLIC BLOOD PRESSURE: 86 MMHG | HEART RATE: 66 BPM | RESPIRATION RATE: 18 BRPM | OXYGEN SATURATION: 96 %

## 2019-06-16 LAB
BASOPHILS # BLD AUTO: 0.03 THOUSANDS/ΜL (ref 0–0.1)
BASOPHILS NFR BLD AUTO: 0 % (ref 0–1)
EOSINOPHIL # BLD AUTO: 0.12 THOUSAND/ΜL (ref 0–0.61)
EOSINOPHIL NFR BLD AUTO: 1 % (ref 0–6)
ERYTHROCYTE [DISTWIDTH] IN BLOOD BY AUTOMATED COUNT: 13.3 % (ref 11.6–15.1)
GLUCOSE SERPL-MCNC: 134 MG/DL (ref 65–140)
GLUCOSE SERPL-MCNC: 147 MG/DL (ref 65–140)
HCT VFR BLD AUTO: 42 % (ref 36.5–49.3)
HGB BLD-MCNC: 13.9 G/DL (ref 12–17)
IMM GRANULOCYTES # BLD AUTO: 0.06 THOUSAND/UL (ref 0–0.2)
IMM GRANULOCYTES NFR BLD AUTO: 1 % (ref 0–2)
LYMPHOCYTES # BLD AUTO: 3.57 THOUSANDS/ΜL (ref 0.6–4.47)
LYMPHOCYTES NFR BLD AUTO: 29 % (ref 14–44)
MCH RBC QN AUTO: 30 PG (ref 26.8–34.3)
MCHC RBC AUTO-ENTMCNC: 33.1 G/DL (ref 31.4–37.4)
MCV RBC AUTO: 91 FL (ref 82–98)
MONOCYTES # BLD AUTO: 0.83 THOUSAND/ΜL (ref 0.17–1.22)
MONOCYTES NFR BLD AUTO: 7 % (ref 4–12)
NEUTROPHILS # BLD AUTO: 7.9 THOUSANDS/ΜL (ref 1.85–7.62)
NEUTS SEG NFR BLD AUTO: 62 % (ref 43–75)
NRBC BLD AUTO-RTO: 0 /100 WBCS
PLATELET # BLD AUTO: 215 THOUSANDS/UL (ref 149–390)
PMV BLD AUTO: 10.8 FL (ref 8.9–12.7)
RBC # BLD AUTO: 4.63 MILLION/UL (ref 3.88–5.62)
WBC # BLD AUTO: 12.51 THOUSAND/UL (ref 4.31–10.16)

## 2019-06-16 PROCEDURE — 82948 REAGENT STRIP/BLOOD GLUCOSE: CPT

## 2019-06-16 PROCEDURE — 99232 SBSQ HOSP IP/OBS MODERATE 35: CPT | Performed by: INTERNAL MEDICINE

## 2019-06-16 PROCEDURE — 85025 COMPLETE CBC W/AUTO DIFF WBC: CPT | Performed by: INTERNAL MEDICINE

## 2019-06-16 RX ORDER — CEPHALEXIN 500 MG/1
500 CAPSULE ORAL EVERY 6 HOURS SCHEDULED
Qty: 8 CAPSULE | Refills: 0 | Status: SHIPPED | OUTPATIENT
Start: 2019-06-16 | End: 2019-06-18

## 2019-06-16 RX ADMIN — FUROSEMIDE 20 MG: 20 TABLET ORAL at 08:22

## 2019-06-16 RX ADMIN — CEFAZOLIN SODIUM 2000 MG: 2 SOLUTION INTRAVENOUS at 05:00

## 2019-06-16 RX ADMIN — PANTOPRAZOLE SODIUM 40 MG: 40 TABLET, DELAYED RELEASE ORAL at 05:04

## 2019-06-16 RX ADMIN — AMLODIPINE BESYLATE 10 MG: 10 TABLET ORAL at 08:22

## 2019-06-16 RX ADMIN — LOSARTAN POTASSIUM 100 MG: 50 TABLET, FILM COATED ORAL at 08:22

## 2019-06-16 RX ADMIN — CEFAZOLIN SODIUM 2000 MG: 2 SOLUTION INTRAVENOUS at 11:11

## 2019-06-16 RX ADMIN — Medication 1 TABLET: at 08:22

## 2019-06-16 RX ADMIN — FLUTICASONE FUROATE AND VILANTEROL TRIFENATATE 1 PUFF: 100; 25 POWDER RESPIRATORY (INHALATION) at 08:22

## 2019-06-16 RX ADMIN — ASPIRIN 325 MG ORAL TABLET 325 MG: 325 PILL ORAL at 08:22

## 2019-06-16 RX ADMIN — METFORMIN HYDROCHLORIDE 500 MG: 500 TABLET ORAL at 08:22

## 2019-06-17 LAB
BACTERIA BLD CULT: NORMAL
BACTERIA BLD CULT: NORMAL

## 2019-06-25 ENCOUNTER — EPISODE CHANGES (OUTPATIENT)
Dept: CASE MANAGEMENT | Facility: HOSPITAL | Age: 69
End: 2019-06-25

## 2019-06-27 ENCOUNTER — PATIENT OUTREACH (OUTPATIENT)
Dept: CASE MANAGEMENT | Facility: OTHER | Age: 69
End: 2019-06-27

## 2019-09-13 ENCOUNTER — PATIENT OUTREACH (OUTPATIENT)
Dept: CASE MANAGEMENT | Facility: OTHER | Age: 69
End: 2019-09-13

## 2020-04-02 ENCOUNTER — TELEPHONE (OUTPATIENT)
Dept: UROLOGY | Facility: MEDICAL CENTER | Age: 70
End: 2020-04-02

## 2020-06-01 RX ORDER — OLMESARTAN MEDOXOMIL 40 MG/1
TABLET ORAL
COMMUNITY

## 2020-06-04 ENCOUNTER — OFFICE VISIT (OUTPATIENT)
Dept: UROLOGY | Facility: MEDICAL CENTER | Age: 70
End: 2020-06-04
Payer: MEDICARE

## 2020-06-04 VITALS
DIASTOLIC BLOOD PRESSURE: 80 MMHG | WEIGHT: 259 LBS | HEIGHT: 73 IN | BODY MASS INDEX: 34.33 KG/M2 | HEART RATE: 81 BPM | TEMPERATURE: 97.1 F | SYSTOLIC BLOOD PRESSURE: 130 MMHG

## 2020-06-04 DIAGNOSIS — N40.1 BENIGN PROSTATIC HYPERPLASIA WITH NOCTURIA: Primary | ICD-10-CM

## 2020-06-04 DIAGNOSIS — R35.1 BENIGN PROSTATIC HYPERPLASIA WITH NOCTURIA: Primary | ICD-10-CM

## 2020-06-04 LAB
SL AMB  POCT GLUCOSE, UA: ABNORMAL
SL AMB LEUKOCYTE ESTERASE,UA: ABNORMAL
SL AMB POCT BILIRUBIN,UA: ABNORMAL
SL AMB POCT BLOOD,UA: ABNORMAL
SL AMB POCT CLARITY,UA: CLEAR
SL AMB POCT COLOR,UA: YELLOW
SL AMB POCT KETONES,UA: ABNORMAL
SL AMB POCT NITRITE,UA: ABNORMAL
SL AMB POCT PH,UA: 7
SL AMB POCT SPECIFIC GRAVITY,UA: 1.02
SL AMB POCT URINE PROTEIN: ABNORMAL
SL AMB POCT UROBILINOGEN: 0.2

## 2020-06-04 PROCEDURE — 99214 OFFICE O/P EST MOD 30 MIN: CPT | Performed by: UROLOGY

## 2020-06-04 PROCEDURE — 81003 URINALYSIS AUTO W/O SCOPE: CPT | Performed by: UROLOGY

## 2021-03-10 DIAGNOSIS — Z23 ENCOUNTER FOR IMMUNIZATION: ICD-10-CM

## 2021-06-07 RX ORDER — COLCHICINE 0.6 MG/1
0.6 TABLET ORAL 2 TIMES DAILY
COMMUNITY
Start: 2020-11-25 | End: 2021-06-09 | Stop reason: ALTCHOICE

## 2021-06-07 RX ORDER — PANTOPRAZOLE SODIUM 40 MG/1
40 TABLET, DELAYED RELEASE ORAL DAILY
COMMUNITY

## 2021-06-09 ENCOUNTER — OFFICE VISIT (OUTPATIENT)
Dept: UROLOGY | Facility: MEDICAL CENTER | Age: 71
End: 2021-06-09
Payer: MEDICARE

## 2021-06-09 VITALS
WEIGHT: 265 LBS | HEART RATE: 82 BPM | BODY MASS INDEX: 35.12 KG/M2 | SYSTOLIC BLOOD PRESSURE: 140 MMHG | DIASTOLIC BLOOD PRESSURE: 80 MMHG | HEIGHT: 73 IN

## 2021-06-09 DIAGNOSIS — N40.1 BENIGN PROSTATIC HYPERPLASIA WITH NOCTURIA: Primary | ICD-10-CM

## 2021-06-09 DIAGNOSIS — R35.1 BENIGN PROSTATIC HYPERPLASIA WITH NOCTURIA: Primary | ICD-10-CM

## 2021-06-09 DIAGNOSIS — Z12.5 SPECIAL SCREENING FOR MALIGNANT NEOPLASM OF PROSTATE: ICD-10-CM

## 2021-06-09 LAB
SL AMB  POCT GLUCOSE, UA: NORMAL
SL AMB LEUKOCYTE ESTERASE,UA: NORMAL
SL AMB POCT BILIRUBIN,UA: NORMAL
SL AMB POCT BLOOD,UA: NORMAL
SL AMB POCT CLARITY,UA: CLEAR
SL AMB POCT COLOR,UA: YELLOW
SL AMB POCT KETONES,UA: NORMAL
SL AMB POCT NITRITE,UA: NORMAL
SL AMB POCT PH,UA: 5
SL AMB POCT SPECIFIC GRAVITY,UA: 1.01
SL AMB POCT URINE PROTEIN: NORMAL
SL AMB POCT UROBILINOGEN: 0.2

## 2021-06-09 PROCEDURE — 99214 OFFICE O/P EST MOD 30 MIN: CPT | Performed by: UROLOGY

## 2021-06-09 PROCEDURE — 81003 URINALYSIS AUTO W/O SCOPE: CPT | Performed by: UROLOGY

## 2021-06-09 RX ORDER — METOPROLOL SUCCINATE AND HYDROCHLOROTHIAZIDE 12.5; 1 MG/1; MG/1
TABLET ORAL
COMMUNITY

## 2021-06-09 RX ORDER — TRIAMCINOLONE ACETONIDE 1 MG/G
CREAM TOPICAL
COMMUNITY
Start: 2021-03-29

## 2021-06-09 NOTE — PROGRESS NOTES
Assessment/Plan:    No problem-specific Assessment & Plan notes found for this encounter  Diagnoses and all orders for this visit:    Benign prostatic hyperplasia with nocturia  -     POCT urine dip auto non-scope    Other orders  -     triamcinolone (KENALOG) 0 1 % cream; APPLY TOPICALLY TWO TIMES A DAY A THIN LAYER TO THE AFFECTED AREA AS NEEDED          Subjective:      Patient ID: Kassi Reyes is a 69 y o  male  HPI  BPH:  He notes no significant urinary symptons  He denies other significant urinary symptoms  He denies gross hematuria, urinary tract infections or incontinence  He is taking neither medications nor supplements for his symptoms  Notes slowing of his stream but it is still adequate  PSA:  0 82 on June 4, 2021  AUA SYMPTOM SCORE      Most Recent Value   AUA SYMPTOM SCORE   How often have you had a sensation of not emptying your bladder completely after you finished urinating? 1   How often have you had to urinate again less than two hours after you finished urinating? 0   How often have you found you stopped and started again several times when you urinate?  0   How often have you found it difficult to postpone urination? 0   How often have you had a weak urinary stream?  1   How often have you had to push or strain to begin urination? 1   How many times did you most typically get up to urinate from the time you went to bed at night until the time you got up in the morning? 2   Quality of Life: If you were to spend the rest of your life with your urinary condition just the way it is now, how would you feel about that?  2   AUA SYMPTOM SCORE  5          The following portions of the patient's history were reviewed and updated as appropriate: allergies, current medications, past family history, past medical history, past social history, past surgical history and problem list     Review of Systems    Constitutional: Negative for activity change and fatigue     Respiratory: Negative for shortness of breath and wheezing  Asthma on inhalers   Cardiovascular: Negative for chest pain  Hypertension  The patient is anticoagulated due to coronary artery disease and arhythmia (atrial fib) due to viral pericarditis in 2020  Gastrointestinal: Negative for abdominal pain  Endocrine:        Non-insulin dependent diabetes  Generally good control  Genitourinary: Negative for difficulty urinating, dysuria, frequency, hematuria and urgency  Musculoskeletal: Negative for back pain and gait problem  Skin: Negative  Allergic/Immunologic: Negative  Neurological: Negative  Hematological:        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 in 1% of pts  Psychiatric/Behavioral: Negative  Objective:      /80   Pulse 82   Ht 6' 1" (1 854 m)   Wt 120 kg (265 lb)   BMI 34 96 kg/m²          Physical Exam  Constitutional:       Appearance: He is well-developed  HENT:      Head: Normocephalic and atraumatic  Neck:      Musculoskeletal: Normal range of motion and neck supple  Pulmonary:      Effort: Pulmonary effort is normal    Genitourinary:     Rectum: Normal       Comments: The prostate is 30 gm, firm, smooth, non-tender  Musculoskeletal: Normal range of motion  Skin:     General: Skin is warm and dry  Neurological:      Mental Status: He is alert and oriented to person, place, and time  Psychiatric:         Behavior: Behavior normal          Thought Content:  Thought content normal          Judgment: Judgment normal

## 2022-06-07 ENCOUNTER — TELEPHONE (OUTPATIENT)
Dept: UROLOGY | Facility: AMBULATORY SURGERY CENTER | Age: 72
End: 2022-06-07

## 2022-06-07 DIAGNOSIS — N40.1 BENIGN PROSTATIC HYPERPLASIA WITH NOCTURIA: Primary | ICD-10-CM

## 2022-06-07 DIAGNOSIS — R35.1 BENIGN PROSTATIC HYPERPLASIA WITH NOCTURIA: Primary | ICD-10-CM

## 2022-06-07 NOTE — TELEPHONE ENCOUNTER
Called patient - LMOM that we updated his PSA script  He is asked to call back if he has any further questions or concerns

## 2022-06-07 NOTE — TELEPHONE ENCOUNTER
Pt was a [facility] patient and he needs an update PSA script placed in his chart   The pt's appointment is 06/20/22    Pt can be reached at [phone]

## 2022-08-15 ENCOUNTER — OFFICE VISIT (OUTPATIENT)
Dept: UROLOGY | Facility: MEDICAL CENTER | Age: 72
End: 2022-08-15
Payer: MEDICARE

## 2022-08-15 VITALS
DIASTOLIC BLOOD PRESSURE: 80 MMHG | SYSTOLIC BLOOD PRESSURE: 120 MMHG | HEART RATE: 80 BPM | HEIGHT: 72 IN | BODY MASS INDEX: 36.3 KG/M2 | WEIGHT: 268 LBS

## 2022-08-15 DIAGNOSIS — N13.8 BPH WITH URINARY OBSTRUCTION: Primary | ICD-10-CM

## 2022-08-15 DIAGNOSIS — N40.1 BPH WITH URINARY OBSTRUCTION: Primary | ICD-10-CM

## 2022-08-15 PROCEDURE — 99213 OFFICE O/P EST LOW 20 MIN: CPT | Performed by: UROLOGY

## 2022-08-15 RX ORDER — DULAGLUTIDE 0.75 MG/.5ML
INJECTION, SOLUTION SUBCUTANEOUS
COMMUNITY
Start: 2022-07-08

## 2022-08-15 RX ORDER — METOPROLOL SUCCINATE 50 MG/1
TABLET, EXTENDED RELEASE ORAL
COMMUNITY
Start: 2022-07-25

## 2022-08-15 RX ORDER — AMLODIPINE BESYLATE 2.5 MG/1
TABLET ORAL
COMMUNITY
Start: 2022-08-09

## 2022-08-15 RX ORDER — APIXABAN 5 MG/1
TABLET, FILM COATED ORAL
COMMUNITY
Start: 2022-07-25

## 2022-08-15 NOTE — PROGRESS NOTES
HISTORY:    Follow-up BPH, no real change in mild symptoms  Daytime flow is slower but not a problem at all  Nocturia times 0-2 or three, he is not sure if there is any correlation to fluid intake    No hematuria infections stones    PSA  0 7         ASSESSMENT / PLAN:    Doing well  We discussed his mild symptoms, it is not bothersome know if to take anything    Follow-up one year    The following portions of the patient's history were reviewed and updated as appropriate: allergies, current medications, past family history, past medical history, past social history, past surgical history and problem list     Review of Systems   All other systems reviewed and are negative  Objective:     Physical Exam  Constitutional:       General: He is not in acute distress  Appearance: He is well-developed  He is not diaphoretic  HENT:      Head: Normocephalic and atraumatic  Eyes:      General: No scleral icterus  Pulmonary:      Effort: Pulmonary effort is normal    Genitourinary:     Comments: Penis testes normal    Prostate mildly to moderately enlarged no nodules  Skin:     Coloration: Skin is not pale  Neurological:      Mental Status: He is alert and oriented to person, place, and time  Psychiatric:         Behavior: Behavior normal          Thought Content:  Thought content normal          Judgment: Judgment normal            No results found for: PSA]  BUN   Date Value Ref Range Status   06/14/2019 18 5 - 25 mg/dL Final     Creatinine   Date Value Ref Range Status   06/14/2019 1 20 0 60 - 1 30 mg/dL Final     Comment:     Standardized to IDMS reference method     No components found for: CBC      Patient Active Problem List   Diagnosis    Benign prostatic hyperplasia with nocturia    Cellulitis of left lower extremity    Charcot foot due to diabetes mellitus ([facility] [address] )    HTN (hypertension)    HLD (hyperlipidemia)    GERD (gastroesophageal reflux disease)    Gout involving toe of left foot    Type 2 diabetes mellitus without complication (HCC)    Mild intermittent asthma without complication    Coronary artery disease involving native coronary artery without angina pectoris        Diagnoses and all orders for this visit:    BPH with urinary obstruction  -     PSA Total, Diagnostic; Future    Other orders  -     amLODIPine (NORVASC) 2 5 mg tablet; amlodipine 2 5 mg tablet   TAKE ONE TABLET BY MOUTH EVERY DAY  -     Eliquis 5 MG  -     Dulaglutide (Trulicity) 7 83 QA/3 9ZG SOPN; Trulicity 4 14 VE/5 7 mL subcutaneous pen injector  -     metoprolol succinate (TOPROL-XL) 50 mg 24 hr tablet           Patient ID: Mauricio Gabriel is a 66 y o  male        Current Outpatient Medications:     ADVAIR DISKUS 250-50 MCG/DOSE inhaler, daily, Disp: , Rfl:     atorvastatin (LIPITOR) 40 mg tablet, daily, Disp: , Rfl:     furosemide (LASIX) 20 mg tablet, Take 20 mg by mouth daily, Disp: , Rfl:     ipratropium-albuterol (COMBIVENT RESPIMAT) inhaler, as needed, Disp: , Rfl:     metFORMIN (GLUCOPHAGE) 500 mg tablet, 2 (two) times a day, Disp: , Rfl:     Metoprolol-HCTZ -12 5 MG TB24, , Disp: , Rfl:     multivitamin-minerals (CENTRUM) tablet, Take 1 tablet by mouth daily, Disp: , Rfl:     olmesartan (BENICAR) 40 mg tablet, olmesartan 40 mg tablet, Disp: , Rfl:     pantoprazole (PROTONIX) 40 mg tablet, Take 40 mg by mouth daily, Disp: , Rfl:     triamcinolone (KENALOG) 0 1 % cream, APPLY TOPICALLY TWO TIMES A DAY A THIN LAYER TO THE AFFECTED AREA AS NEEDED, Disp: , Rfl:     Past Medical History:   Diagnosis Date    Asthma     BPH (benign prostatic hyperplasia)     DM (diabetes mellitus), type 2 ([address] )     Hearing loss     Hypertension     Nocturia        Past Surgical History:   Procedure Laterality Date    EYE SURGERY      KNEE SURGERY      TONSILLECTOMY         Social History

## 2023-10-17 ENCOUNTER — HOSPITAL ENCOUNTER (OUTPATIENT)
Dept: NON INVASIVE DIAGNOSTICS | Facility: CLINIC | Age: 73
Discharge: HOME | End: 2023-10-17
Payer: MEDICARE

## 2023-10-17 DIAGNOSIS — M79.672 LEFT FOOT PAIN: ICD-10-CM

## 2023-10-17 PROCEDURE — 93923 UPR/LXTR ART STDY 3+ LVLS: CPT

## 2023-10-28 ENCOUNTER — HOSPITAL ENCOUNTER (EMERGENCY)
Facility: HOSPITAL | Age: 73
Discharge: HOME | End: 2023-10-28
Attending: EMERGENCY MEDICINE | Admitting: EMERGENCY MEDICINE
Payer: MEDICARE

## 2023-10-28 ENCOUNTER — APPOINTMENT (EMERGENCY)
Dept: CT IMAGING | Facility: HOSPITAL | Age: 73
End: 2023-10-28
Payer: MEDICARE

## 2023-10-28 VITALS
BODY MASS INDEX: 35.7 KG/M2 | HEART RATE: 75 BPM | TEMPERATURE: 97.9 F | SYSTOLIC BLOOD PRESSURE: 172 MMHG | DIASTOLIC BLOOD PRESSURE: 75 MMHG | OXYGEN SATURATION: 97 % | WEIGHT: 263.23 LBS | RESPIRATION RATE: 16 BRPM

## 2023-10-28 DIAGNOSIS — M48.00 SPINAL STENOSIS: ICD-10-CM

## 2023-10-28 DIAGNOSIS — M47.819 SPINAL ARTHRITIS: ICD-10-CM

## 2023-10-28 DIAGNOSIS — M54.50 ACUTE RIGHT-SIDED LOW BACK PAIN WITHOUT SCIATICA: Primary | ICD-10-CM

## 2023-10-28 PROCEDURE — 99283 EMERGENCY DEPT VISIT LOW MDM: CPT

## 2023-10-28 PROCEDURE — 72131 CT LUMBAR SPINE W/O DYE: CPT

## 2023-10-28 PROCEDURE — G1004 CDSM NDSC: HCPCS

## 2023-10-28 PROCEDURE — 99284 EMERGENCY DEPT VISIT MOD MDM: CPT | Performed by: EMERGENCY MEDICINE

## 2023-10-28 NOTE — ED PROVIDER NOTES
History  Chief Complaint   Patient presents with    Back Pain     Pt reports lower back pain beginning a week ago. Reports chronic back pain due to falling off carla mountain climbing 50 years ago. Denies urinary or GI symptoms. Back Pain    Complaining of low back pain going on for about a week or per his wife may be a little longer that centered around the right SI joint. It gets worse with certain types of movement. If remaining still and gets in a position of comfort it does not really hurt. He did notice a little tingling or numbness in the toes on the right but this does not radiate down his leg per se. No weakness. He is able to ambulate but normally does not ambulate a whole lot because of other joint issues. There is no inciting trauma however per the wife they went on a trip to [place] few weeks ago to go fly fishing where they stood in a river for some time but again there was no obvious acute trauma. There is been no fever. He has no bowel or bladder complaints. There is no abdominal pain. He has a history of having a fall off a carla sustaining a vertebral fracture about 50 years ago and never had that worked on. He also has a history of about 20 years ago potentially needing surgery on his back due to radicular issues but then apparently that resolved and he never had surgery. Prior to Admission Medications   Prescriptions Last Dose Informant Patient Reported? Taking?    ADVAIR DISKUS 250-50 MCG/DOSE inhaler  Self Yes No   Sig: daily   Dulaglutide (Trulicity) 0.99 IP/0.3QJ SOPN   Yes No   Sig: Trulicity 6.20 QV/2.3 mL subcutaneous pen injector   Eliquis 5 MG   Yes No   Metoprolol-HCTZ -12.5 MG TB24   Yes No   amLODIPine (NORVASC) 2.5 mg tablet   Yes No   Sig: amlodipine 2.5 mg tablet   TAKE ONE TABLET BY MOUTH EVERY DAY   atorvastatin (LIPITOR) 40 mg tablet  Self Yes No   Sig: daily   furosemide (LASIX) 20 mg tablet  Self Yes No   Sig: Take 20 mg by mouth daily ipratropium-albuterol (COMBIVENT RESPIMAT) inhaler  Self Yes No   Sig: as needed   metFORMIN (GLUCOPHAGE) 500 mg tablet  Self Yes No   Si (two) times a day   metoprolol succinate (TOPROL-XL) 50 mg 24 hr tablet   Yes No   multivitamin-minerals (CENTRUM) tablet  Self Yes No   Sig: Take 1 tablet by mouth daily   olmesartan (BENICAR) 40 mg tablet   Yes No   Sig: olmesartan 40 mg tablet   pantoprazole (PROTONIX) 40 mg tablet   Yes No   Sig: Take 40 mg by mouth daily   triamcinolone (KENALOG) 0.1 % cream   Yes No   Sig: APPLY TOPICALLY TWO TIMES A DAY A THIN LAYER TO THE AFFECTED AREA AS NEEDED   Patient not taking: Reported on 8/15/2022      Facility-Administered Medications: None       Past Medical History:   Diagnosis Date    Asthma     BPH (benign prostatic hyperplasia)     DM (diabetes mellitus), type 2 (HCC)     Hearing loss     Hypertension     Nocturia        Past Surgical History:   Procedure Laterality Date    EYE SURGERY      KNEE SURGERY      TONSILLECTOMY         Family History   Problem Relation Age of Onset    Cancer Mother     Heart failure Father      I have reviewed and agree with the history as documented. E-Cigarette/Vaping     E-Cigarette/Vaping Substances     Social History     Tobacco Use    Smoking status: Never    Smokeless tobacco: Never   Substance Use Topics    Alcohol use: Yes    Drug use: No       Review of Systems   Musculoskeletal:  Positive for back pain. Physical Exam  Physical Exam  Vitals and nursing note reviewed. Constitutional:       General: He is not in acute distress. Appearance: Normal appearance. He is not ill-appearing, toxic-appearing or diaphoretic. HENT:      Head: Normocephalic and atraumatic. Cardiovascular:      Rate and Rhythm: Normal rate and regular rhythm. Pulses: Normal pulses. Pulmonary:      Effort: Pulmonary effort is normal. No respiratory distress. Breath sounds: Normal breath sounds.    Abdominal:      General: Abdomen is protuberant. Tenderness: There is no abdominal tenderness. There is no right CVA tenderness or left CVA tenderness. Musculoskeletal:      Cervical back: Normal range of motion. Thoracic back: Normal.      Lumbar back: Tenderness present. No swelling, edema, deformity or signs of trauma. Decreased range of motion. Negative right straight leg raise test and negative left straight leg raise test.      Comments: He has a little tenderness over the right SI joint. There is no specific midline tenderness. Neurological:      Mental Status: He is alert. Sensory: No sensory deficit. Motor: Motor function is intact. No weakness. Gait: Gait is intact. Gait normal.      Deep Tendon Reflexes:      Reflex Scores:       Patellar reflexes are 2+ on the right side and 2+ on the left side. Achilles reflexes are 2+ on the right side and 2+ on the left side. Comments: Patient has some discomfort going from sitting to standing but once he was up he was able to ambulate without difficulty. Psychiatric:         Mood and Affect: Mood normal.         Vital Signs  ED Triage Vitals [10/28/23 0930]   Temperature Pulse Respirations Blood Pressure SpO2   97.9 °F (36.6 °C) 75 16 (!) 172/75 97 %      Temp Source Heart Rate Source Patient Position - Orthostatic VS BP Location FiO2 (%)   Oral Monitor Sitting Right arm --      Pain Score       --           Vitals:    10/28/23 0930   BP: (!) 172/75   Pulse: 75   Patient Position - Orthostatic VS: Sitting         Visual Acuity      ED Medications  Medications - No data to display    Diagnostic Studies  Results Reviewed       None                   CT lumbar spine without contrast   ED Interpretation by Soraida Cohen MD (10/28 111)   I have reviewed the film, per my independent interpretation : Not appreciate an acute compression fracture. Formal read per radiology. .        Final Result by Hilda London MD (10/28 1313)      No acute fracture. Grade 1 anterolisthesis of L5 over S1 likely in part due to severe facet arthropathy as well as a left-sided chronic appearing pars defect at L5. Bilateral assimilation joints at the lumbosacral junction right greater than left. Multilevel minimal to mild disc bulges and facet arthropathy as above. There is severe bilateral neural foraminal narrowing at L5-S1. Bilateral renal cysts are partially seen. The abdominal aorta is calcified. Workstation performed: [id number]                    Procedures  Procedures         ED Course  ED Course as of 10/28/23 1556   Sat Oct 28, 2023   1216 Reading not back yet called over to the reading room and they will place a rash on the reading. Informed. Medical Decision Making  No acute fracture or or significant acute findings that need urgent attention. Patient is neurologically intact can stand walk and has no bowel or bladder complaints I do not think an urgent MRI is indicated at this point. He needs follow-up with the spinal [place] so I placed an ambulatory referral.  Patient is rather new to the area and is in need of primary care as well as a cardiologist so I applied him with those numbers as well. We had some suggestions about pain control and actually when he is laying still he does not have a lot of pain. I told him he could try lidocaine patch and use ice or heat to the area. I think physical therapy would be beneficial and going to the comprehensive spine [place] they can refer to a specialist if symptoms worsen. He has no fever to suggest any infection. Amount and/or Complexity of Data Reviewed  Radiology: ordered and independent interpretation performed.              Disposition  Final diagnoses:   Acute right-sided low back pain without sciatica   Spinal stenosis   Spinal arthritis     Time reflects when diagnosis was documented in both MDM as applicable and the Disposition within this note       Time User Action Codes Description Comment    10/28/2023  1:23 PM Cindy Li Add [M54.50] Acute right-sided low back pain without sciatica     10/28/2023  1:23 PM Cindy Li Add [M48.00] Spinal stenosis     10/28/2023  1:24 PM [place], [address] [place] [address] [P97.000] Spinal arthritis           ED Disposition       ED Disposition   Discharge    Condition   Stable    Date/Time   Sat Oct 28, 2023  1:23 PM    [id number] [facility] discharge to home/self care.                    Follow-up Information       Follow up With Specialties Details Why Contact Info Additional Information    Fortino Meigs., MD Family Medicine Schedule an appointment as soon as possible for a visit  reevaluation [facility].  [address] [place] [id number] [phone]       [facility] Physical Therapy Schedule an appointment as soon as possible for a visit in 1 week reevaluation [phone] [address] Cardiology Call  for a cardiologist [address] [address]  [address], [address] [phone]            Discharge Medication List as of 10/28/2023  1:25 PM        CONTINUE these medications which have NOT CHANGED    Details   ADVAIR DISKUS 250-50 MCG/DOSE inhaler daily, Starting Fri 11/17/2017, Historical Med      amLODIPine (NORVASC) 2.5 mg tablet amlodipine 2.5 mg tablet   TAKE ONE TABLET BY MOUTH EVERY DAY, Historical Med      atorvastatin (LIPITOR) 40 mg tablet daily, Starting Fri 9/8/2017, Historical Med      Dulaglutide (Trulicity) 6.37 JK/2.2BC SOPN Trulicity 6.33 DK/5.5 mL subcutaneous pen injector, Historical Med      Eliquis 5 MG Starting Mon 7/25/2022, Historical Med      furosemide (LASIX) 20 mg tablet Take 20 mg by mouth daily, Historical Med      ipratropium-albuterol (COMBIVENT RESPIMAT) inhaler as needed, Starting Sun 11/20/2016, Historical Med      metFORMIN (GLUCOPHAGE) 500 mg tablet 2 (two) times a day, Starting Fri 2/9/2018, Historical Med      metoprolol succinate (TOPROL-XL) 50 mg 24 hr tablet Starting Mon 7/25/2022, Historical Med      Metoprolol-HCTZ -12.5 MG TB24 Historical Med      multivitamin-minerals (CENTRUM) tablet Take 1 tablet by mouth daily, Historical Med      olmesartan (BENICAR) 40 mg tablet olmesartan 40 mg tablet, Historical Med      pantoprazole (PROTONIX) 40 mg tablet Take 40 mg by mouth daily, Historical Med      triamcinolone (KENALOG) 0.1 % cream APPLY TOPICALLY TWO TIMES A DAY A THIN LAYER TO THE AFFECTED AREA AS NEEDED, Historical Med                 PDMP Review       None            ED Provider  Electronically Signed by             Tico Castellon MD  10/28/23 3788

## 2023-10-30 ENCOUNTER — NURSE TRIAGE (OUTPATIENT)
Dept: PHYSICAL THERAPY | Facility: OTHER | Age: 73
End: 2023-10-30

## 2023-10-30 DIAGNOSIS — M54.50 ACUTE RIGHT-SIDED LOW BACK PAIN WITHOUT SCIATICA: Primary | ICD-10-CM

## 2023-10-30 NOTE — TELEPHONE ENCOUNTER
Additional Information   Negative: Is this related to an MVA? Negative: Is this related to a work injury? Negative: Are you currently recieving homecare services? Negative: Has the patient had unexplained weight loss? Negative: Does the patient have a fever? Negative: Is the patient experiencing blood in sputum? Negative: Has the patient experienced major trauma? (fall from height, high speed collision, direct blow to spine) and is also experiencing nausea, light-headedness, or loss of consciousness? Negative: Is the patient experiencing urine retention? Negative: Is the patient experiencing acute drop foot or paralysis? Negative: Is this a chronic condition? Background - Initial Assessment  Clinical complaint::Right sided low back pain  Date of onset: 9 days ago -NKI  Frequency of pain::Intermittent  Quality of pain: sharp    Protocols used: [facility] Protocol    This RN did review the [facility] in detail and the services offered for his Back or Neck pain. Patient is agreeable to triage and would like to participate in PT evaluation with [facility] Therapist/DPT. Patient to discuss current complaints, HX, possible treatment plan(s), and any necessary referrals or interventions with the DPT at time of service. Triaged and NO RF s/s Present. Referral entered for the [address] [place] [address]  site and contact information provided to the patient as well. Patient is aware clerical will call to assist with scheduling. Nurse encouraged the patient to call the site if he does not hear from clerical beforehand. Patient agreed. Patient did not voice any questions or concerns at this time. All information regarding plan to be evaluated by the Advanced therapist was reviewed and Patient is in agreement with nurse's explanation of intended care path discussed. Patient very appreciative of call, triage, and referral placement.     Nurse wished him well and referral closed per protocol.

## 2023-11-03 ENCOUNTER — OFFICE VISIT (OUTPATIENT)
Dept: PAIN MEDICINE | Facility: CLINIC | Age: 73
End: 2023-11-03
Payer: MEDICARE

## 2023-11-03 VITALS
HEIGHT: 72 IN | SYSTOLIC BLOOD PRESSURE: 134 MMHG | WEIGHT: 263 LBS | BODY MASS INDEX: 35.62 KG/M2 | DIASTOLIC BLOOD PRESSURE: 66 MMHG

## 2023-11-03 DIAGNOSIS — M47.816 LUMBAR SPONDYLOSIS: Primary | ICD-10-CM

## 2023-11-03 DIAGNOSIS — M43.17 SPONDYLOLISTHESIS AT L5-S1 LEVEL: ICD-10-CM

## 2023-11-03 DIAGNOSIS — M48.061 LUMBAR FORAMINAL STENOSIS: ICD-10-CM

## 2023-11-03 PROCEDURE — 99204 OFFICE O/P NEW MOD 45 MIN: CPT | Performed by: ANESTHESIOLOGY

## 2023-11-03 RX ORDER — ACETAMINOPHEN 160 MG
TABLET,DISINTEGRATING ORAL
COMMUNITY
Start: 2023-10-02

## 2023-11-03 RX ORDER — CALCIUM CARBONATE 260MG(650)
TABLET,CHEWABLE ORAL
COMMUNITY
Start: 2023-10-02

## 2023-11-03 RX ORDER — PREDNISONE 10 MG/1
TABLET ORAL
Qty: 39 TABLET | Refills: 0 | Status: SHIPPED | OUTPATIENT
Start: 2023-11-03

## 2023-11-03 RX ORDER — PYRIDOXINE HCL (VITAMIN B6) 50 MG
1 TABLET ORAL DAILY
COMMUNITY

## 2023-11-03 NOTE — PROGRESS NOTES
Assessment  1. Lumbar spondylosis    2. Spondylolisthesis at L5-S1 level    3. Lumbar foraminal stenosis        Plan  Patient presenting with chronic back pain for 10+ years, acutely worsening over the past 2 weeks. Pain is consistent with lumbar spondylosis accompanied by pain >7/10 on the pain scale with inability to participate in IADLs for >6 weeks. Has been taking Tylenol with limited benefit. Denies any bowel or bladder incontinence, saddle anesthesia. Reviewed and interpreted relevant imaging studies - specifically lumbar CT scan and discussed the results and clinical significance with the patient. This shows multilevel degenerative changes most significant at L5-S1 with severe facet arthropathy, Grade 1 anterolisthesis and bilateral foraminal narrowing. Fortunately, as we discussed, he does not have any current radicular symptoms or red flag signs. He does have significant left lower lumbar axial pain. I discussed and recommended starting a prednisone taper to help the acute inflammatory component of his symptoms and to also start with physical therapy to help reduce the myofascial component. If there is no benefit going forward, we will move in a stepwise fashion. I did briefly discuss to target facet medicated pain, we would proceed with diagnostic medial branch blocks and possible radiofrequency ablation. We will first hope that the prednisone and PT can help his acute symptoms. He can also continue to take Tylenol while on the prednisone taper, but to avoid NSAIDs. Reviewed external notes from the relevant aspects of the patient's medical record, specifically PCnotes in regards to current and prior treatments tried (as mentioned in history of present illness). Reviewed pertinent laboratory studies, specifically renal function, hemoglobin A1c, CBC, coagulation studies, prior to recommending medication therapies/interventional treatment options.     [place] Prescription Drug Monitoring Program report was reviewed and was appropriate     My impressions and treatment recommendations were discussed in detail with the patient who verbalized understanding and had no further questions. Discharge instructions were provided. I personally saw and examined the patient and I agree with the above discussed plan of care. No orders of the defined types were placed in this encounter. New Medications Ordered This Visit   Medications    Zinc 10 MG LOZG     Sig: Take by mouth    Cholecalciferol (Vitamin D3) 50 MCG (2000 UT) capsule     Sig: Take by mouth    Ipratropium-Albuterol (COMBIVENT IN)     Sig: Combivent 18 mcg-103 mcg-/inh inhalation aerosol; 2 puff(s) inhaled once a day Quantity: 0 Refills: 0 Ordered: 6-Feb-2023 Lastas, CarissaGeneric Substitution Allowed    vitamin B-12 (CYANOCOBALAMIN) 50 MCG TABS     Sig: Place 1 tablet under the tongue daily    predniSONE 10 mg tablet     Sig: Take 60mg x3 days, 40mg x3 days, 20mg x3 days, 10mg x3 days     Dispense:  39 tablet     Refill:  0       History of Present Illness    Keren Vazquez is a 75 y.o. male presenting for new patient visit at [address] [place] [address] [facility] for exam and evaluation of acute on chronic low back pain for greater than 10 years, worsening over the past 2 weeks. Pain started without any precipitating injury or trauma. Over the past month, the intensity of pain has been severe. Pain is currently 7-8. Pain does interfere with age appropriate activities of daily living. Pain is occasional (constant when moving), with no typical pattern throughout the day. Pain is described as sharp. Patient denies weakness in the lower extremities. Assistance device used: None. Pain is increased with standing, bending, walking. Pain is decreased with resting.     Treatments tried:   PT: no  Injections: no   Anticoagulation: yes  Previous spine surgery at affected area: No    Medications tried:   Tylenol    I have personally reviewed and/or updated the patient's past medical history, past surgical history, family history, social history, current medications, allergies, and vital signs today. Review of Systems   Constitutional:  Negative for chills and fever. HENT:  Negative for ear pain and sore throat. Eyes:  Negative for pain and visual disturbance. Respiratory:  Negative for cough and shortness of breath. Cardiovascular:  Negative for chest pain and palpitations. Gastrointestinal:  Negative for abdominal pain and vomiting. Genitourinary:  Negative for dysuria and hematuria. Musculoskeletal:  Positive for arthralgias, back pain and gait problem. Skin:  Negative for color change and rash. Neurological:  Positive for weakness. Negative for seizures and syncope. All other systems reviewed and are negative.       Patient Active Problem List   Diagnosis    Benign prostatic hyperplasia with nocturia    Cellulitis of left lower extremity    Charcot foot due to diabetes mellitus (HCC)    HTN (hypertension)    HLD (hyperlipidemia)    GERD (gastroesophageal reflux disease)    Gout involving toe of left foot    Type 2 diabetes mellitus without complication (HCC)    Mild intermittent asthma without complication    Coronary artery disease involving native coronary artery without angina pectoris       Past Medical History:   Diagnosis Date    Asthma     BPH (benign prostatic hyperplasia)     DM (diabetes mellitus), type 2 (HCC)     Hearing loss     Hypertension     Nocturia        Past Surgical History:   Procedure Laterality Date    CORONARY ANGIOPLASTY WITH STENT PLACEMENT N/A 2013    EYE SURGERY      KNEE SURGERY      TONSILLECTOMY         Family History   Problem Relation Age of Onset    Cancer Mother     Heart failure Father        Social History     Occupational History    Not on file   Tobacco Use    Smoking status: Never    Smokeless tobacco: Never   Substance and Sexual Activity    Alcohol use: Yes    Drug use: No    Sexual activity: Not on file       Current Outpatient Medications on File Prior to Visit   Medication Sig    ADVAIR DISKUS 250-50 MCG/DOSE inhaler daily    amLODIPine (NORVASC) 2.5 mg tablet amlodipine 2.5 mg tablet   TAKE ONE TABLET BY MOUTH EVERY DAY    atorvastatin (LIPITOR) 40 mg tablet daily    Cholecalciferol (Vitamin D3) 50 MCG (2000 UT) capsule Take by mouth    Dulaglutide (Trulicity) 9.26 XS/1.9DM SOPN Trulicity 0.87 MM/9.7 mL subcutaneous pen injector    Eliquis 5 MG     furosemide (LASIX) 20 mg tablet Take 20 mg by mouth daily    Ipratropium-Albuterol (COMBIVENT IN) Combivent 18 mcg-103 mcg-/inh inhalation aerosol; 2 puff(s) inhaled once a day Quantity: 0 Refills: 0 Ordered: 6-Feb-2023 Laudati, CarissaGeneric Substitution Allowed    ipratropium-albuterol (COMBIVENT RESPIMAT) inhaler as needed    metFORMIN (GLUCOPHAGE) 500 mg tablet 2 (two) times a day    Metoprolol-HCTZ -12.5 MG TB24     multivitamin-minerals (CENTRUM) tablet Take 1 tablet by mouth daily    olmesartan (BENICAR) 40 mg tablet olmesartan 40 mg tablet    pantoprazole (PROTONIX) 40 mg tablet Take 40 mg by mouth daily    Zinc 10 MG LOZG Take by mouth    metoprolol succinate (TOPROL-XL) 50 mg 24 hr tablet     triamcinolone (KENALOG) 0.1 % cream APPLY TOPICALLY TWO TIMES A DAY A THIN LAYER TO THE AFFECTED AREA AS NEEDED (Patient not taking: Reported on 8/15/2022)    vitamin B-12 (CYANOCOBALAMIN) 50 MCG TABS Place 1 tablet under the tongue daily     No current facility-administered medications on file prior to visit. Allergies   Allergen Reactions    Celecoxib Palpitations    Clarithromycin     Rofecoxib Palpitations     Heart palpations. Other reaction(s): Erythema  All salmon inhibitors. Physical Exam    /66   Ht 6' (1.829 m)   Wt 119 kg (263 lb)   BMI 35.67 kg/m²     Constitutional: normal, well developed, well nourished, alert, in no distress and non-toxic and no overt pain behavior.   Eyes: anicteric  HEENT: grossly intact  Neck: supple, symmetric, trachea midline and no masses   Pulmonary:even and unlabored  Cardiovascular:No edema or pitting edema present  Skin:Normal without rashes or lesions and well hydrated  Psychiatric:Mood and affect appropriate  Neurologic: Motor function is grossly intact with no focal neurologic deficits  Musculoskeletal: limited lumbar spine ROM. Pain with left lateral flexion, extension    Imaging  CT LUMBAR SPINE     INDICATION:   Lumbar radiculopathy, osteoporosis presence or risk  right sided SI joint pain, h/o prior lumbar fracture. COMPARISON: None. TECHNIQUE:  Contiguous axial images through the lumbar spine were obtained. Sagittal and coronal reconstructions were performed. IV Contrast: None. Radiation dose length product (DLP) for this visit:  1466 mGy-cm . This examination, like all CT scans performed in the [facility], was performed utilizing techniques to minimize radiation dose exposure, including the use of iterative   reconstruction and automated exposure control. IMAGE QUALITY:  Diagnostic. FINDINGS:     ALIGNMENT:  There are 5 lumbar type vertebral bodies. There is grade 1 anterolisthesis of L5 over S1. This is likely in part due to facet arthropathy as well as a left-sided pars defect at L5 which appears chronic. Bilateral assimilation joints at the   lumbosacral junction right greater than left. VERTEBRAE: No acute fracture. DEGENERATIVE CHANGES:     Lower Thoracic spine:  Normal lower thoracic disc spaces. L1-2: Minimal disc bulge and mild facet arthropathy. No significant central canal or neural foraminal stenosis. L2-3: Minimal disc bulge and mild facet arthropathy. There is minimal central canal and minimal bilateral neural foraminal narrowing. L3-4: Minimal disc bulge and mild facet arthropathy. Minimal central canal stenosis and minimal bilateral neural foraminal narrowing.      L4-5: Mild disc bulge and severe facet arthropathy. No significant central canal stenosis but there is mild bilateral neural foraminal narrowing. L5-S1: There is uncovering of the posterior disc with a mild disc bulge and endplate spurring. No central canal stenosis. There is severe facet arthropathy and a chronic appearing left-sided pars defect. There is no significant central canal stenosis but   there is severe bilateral neural foraminal narrowing. PARASPINAL SOFT TISSUES: Bilateral renal cysts are partially seen. The abdominal aorta is calcified. The paraspinal muscles appear unremarkable. IMPRESSION:     No acute fracture. Grade 1 anterolisthesis of L5 over S1 likely in part due to severe facet arthropathy as well as a left-sided chronic appearing pars defect at L5. Bilateral assimilation joints at the lumbosacral junction right greater than left. Multilevel minimal to mild disc bulges and facet arthropathy as above. There is severe bilateral neural foraminal narrowing at L5-S1. Bilateral renal cysts are partially seen. The abdominal aorta is calcified.

## 2023-11-13 ENCOUNTER — TELEPHONE (OUTPATIENT)
Age: 73
End: 2023-11-13

## 2023-11-13 NOTE — TELEPHONE ENCOUNTER
Patient of Dr. Hanna Shipley in [place]    Patient called to confirm his appointment for 11/20/23 and he needs an updated psa order in epic.

## 2023-11-14 ENCOUNTER — APPOINTMENT (OUTPATIENT)
Dept: LAB | Facility: MEDICAL CENTER | Age: 73
End: 2023-11-14
Attending: UROLOGY
Payer: MEDICARE

## 2023-11-14 ENCOUNTER — TELEPHONE (OUTPATIENT)
Dept: UROLOGY | Facility: MEDICAL CENTER | Age: 73
End: 2023-11-14

## 2023-11-14 DIAGNOSIS — N13.8 BPH WITH URINARY OBSTRUCTION: Primary | ICD-10-CM

## 2023-11-14 DIAGNOSIS — N40.1 BPH WITH URINARY OBSTRUCTION: ICD-10-CM

## 2023-11-14 DIAGNOSIS — N13.8 BPH WITH URINARY OBSTRUCTION: ICD-10-CM

## 2023-11-14 DIAGNOSIS — N40.1 BPH WITH URINARY OBSTRUCTION: Primary | ICD-10-CM

## 2023-11-14 LAB — PSA SERPL-MCNC: 1.13 NG/ML (ref 0–4)

## 2023-11-14 PROCEDURE — 36415 COLL VENOUS BLD VENIPUNCTURE: CPT

## 2023-11-14 PROCEDURE — 84153 ASSAY OF PSA TOTAL: CPT

## 2023-11-30 ENCOUNTER — APPOINTMENT (OUTPATIENT)
Dept: LAB | Facility: MEDICAL CENTER | Age: 73
End: 2023-11-30
Payer: MEDICARE

## 2023-11-30 ENCOUNTER — OFFICE VISIT (OUTPATIENT)
Dept: FAMILY MEDICINE CLINIC | Facility: CLINIC | Age: 73
End: 2023-11-30
Payer: MEDICARE

## 2023-11-30 VITALS
HEART RATE: 77 BPM | TEMPERATURE: 98 F | SYSTOLIC BLOOD PRESSURE: 130 MMHG | OXYGEN SATURATION: 95 % | HEIGHT: 75 IN | BODY MASS INDEX: 32.2 KG/M2 | DIASTOLIC BLOOD PRESSURE: 70 MMHG | WEIGHT: 259 LBS

## 2023-11-30 DIAGNOSIS — E11.610 CHARCOT FOOT DUE TO DIABETES MELLITUS (HCC): ICD-10-CM

## 2023-11-30 DIAGNOSIS — I25.10 CORONARY ARTERY DISEASE INVOLVING NATIVE CORONARY ARTERY WITHOUT ANGINA PECTORIS, UNSPECIFIED WHETHER NATIVE OR TRANSPLANTED HEART: ICD-10-CM

## 2023-11-30 DIAGNOSIS — Z23 ENCOUNTER FOR IMMUNIZATION: ICD-10-CM

## 2023-11-30 DIAGNOSIS — I48.0 PAROXYSMAL ATRIAL FIBRILLATION (HCC): ICD-10-CM

## 2023-11-30 DIAGNOSIS — C91.10 CLL (CHRONIC LYMPHOCYTIC LEUKEMIA) (HCC): ICD-10-CM

## 2023-11-30 DIAGNOSIS — Z71.85 VACCINE COUNSELING: ICD-10-CM

## 2023-11-30 DIAGNOSIS — E11.9 TYPE 2 DIABETES MELLITUS WITHOUT COMPLICATION, WITHOUT LONG-TERM CURRENT USE OF INSULIN (HCC): ICD-10-CM

## 2023-11-30 DIAGNOSIS — E11.9 TYPE 2 DIABETES MELLITUS WITHOUT COMPLICATION, WITHOUT LONG-TERM CURRENT USE OF INSULIN (HCC): Primary | ICD-10-CM

## 2023-11-30 DIAGNOSIS — E55.9 VITAMIN D DEFICIENCY: ICD-10-CM

## 2023-11-30 LAB
25(OH)D3 SERPL-MCNC: 36.7 NG/ML (ref 30–100)
CHOLEST SERPL-MCNC: 128 MG/DL
CREAT UR-MCNC: 58 MG/DL
EST. AVERAGE GLUCOSE BLD GHB EST-MCNC: 203 MG/DL
HBA1C MFR BLD: 8.7 %
HDLC SERPL-MCNC: 37 MG/DL
LDLC SERPL CALC-MCNC: 55 MG/DL (ref 0–100)
MICROALBUMIN UR-MCNC: 21.1 MG/L
MICROALBUMIN/CREAT 24H UR: 36 MG/G CREATININE (ref 0–30)
TRIGL SERPL-MCNC: 180 MG/DL

## 2023-11-30 PROCEDURE — 82306 VITAMIN D 25 HYDROXY: CPT

## 2023-11-30 PROCEDURE — 99204 OFFICE O/P NEW MOD 45 MIN: CPT | Performed by: FAMILY MEDICINE

## 2023-11-30 PROCEDURE — 80061 LIPID PANEL: CPT

## 2023-11-30 PROCEDURE — 82043 UR ALBUMIN QUANTITATIVE: CPT

## 2023-11-30 PROCEDURE — G0008 ADMIN INFLUENZA VIRUS VAC: HCPCS

## 2023-11-30 PROCEDURE — 90662 IIV NO PRSV INCREASED AG IM: CPT

## 2023-11-30 PROCEDURE — 83036 HEMOGLOBIN GLYCOSYLATED A1C: CPT

## 2023-11-30 PROCEDURE — 82570 ASSAY OF URINE CREATININE: CPT

## 2023-11-30 PROCEDURE — 36415 COLL VENOUS BLD VENIPUNCTURE: CPT

## 2023-11-30 NOTE — PROGRESS NOTES
Name: Enedina Packer      : 1950      MRN: [id number]  Encounter Provider: Jordan Bass MD  Encounter Date: 2023   Encounter department: [facility]     1. Type 2 diabetes mellitus without complication, without long-term current use of insulin ([facility])  Assessment & Plan:    Lab Results   Component Value Date    HGBA1C 7.1 (H) 2023   Recehck labs continue medication as prescribed at this time    Orders:  -     metFORMIN (GLUCOPHAGE) 500 mg tablet; Take 2 in AM one In PM  -     Hemoglobin A1C; Future; Expected date: 2023  -     Albumin / creatinine urine ratio; Future; Expected date: 2023  -     Lipid Panel with Direct LDL reflex; Future; Expected date: 2023    2. CLL (chronic lymphocytic leukemia) ([facility])  Assessment & Plan:  Follows with Doctor at [facility], continue to monitor      3. Paroxysmal atrial fibrillation [facility])  Assessment & Plan:  Referred to cardiology, patient unsure if he will go [facility] or [facility]      4. Charcot foot due to diabetes mellitus [facility])  Assessment & Plan:  Seeing podiatry  Lab Results   Component Value Date    HGBA1C 7.1 (H) 2023         5. Coronary artery disease involving native coronary artery without angina pectoris, unspecified whether native or transplanted heart  -     Ambulatory Referral to Cardiology; Future    6. Vitamin D deficiency  -     Vitamin D 25 hydroxy; Future    7. Vaccine counseling    8. Encounter for immunization  -     influenza vaccine, high-dose, PF 0.7 mL (FLUZONE HIGH-DOSE)        Depression Screening and Follow-up Plan: Patient was screened for depression during today's encounter. They screened negative with a PHQ-2 score of 0. Subjective     HPI    75year old male with pmh of type 2 DM, htn, CLL, presenting to [facility] care.     Overall doing well, with no concerns today, recentlu  Had prednisone treatment for back pain treatement was very helpful, treated with Dr. Suad Ogden and is starting PT next week. Asthma well controlled. Following with oncology in [facility]    Previous PCP is retiring      Review of Systems   Constitutional:  Negative for activity change and appetite change. Respiratory:  Negative for apnea and chest tightness. Cardiovascular:  Negative for chest pain and palpitations. Gastrointestinal:  Negative for abdominal distention and abdominal pain. Musculoskeletal:  Negative for arthralgias and back pain.        Past Medical History:   Diagnosis Date    Asthma     BPH (benign prostatic hyperplasia)     DM (diabetes mellitus), type 2 (HCC)     Hearing loss     Hypertension     Nocturia      Past Surgical History:   Procedure Laterality Date    CORONARY ANGIOPLASTY WITH STENT PLACEMENT N/A 2013    EYE SURGERY      KNEE SURGERY      TONSILLECTOMY       Family History   Problem Relation Age of Onset    Cancer Mother     Heart failure Father      Social History     Socioeconomic History    Marital status: /Civil Union     Spouse name: None    Number of children: None    Years of education: None    Highest education level: None   Occupational History    None   Tobacco Use    Smoking status: Never    Smokeless tobacco: Never   Substance and Sexual Activity    Alcohol use: Yes    Drug use: No    Sexual activity: None   Other Topics Concern    None   Social History Narrative    None     Social Determinants of Health     Financial Resource Strain: Not on file   Food Insecurity: Not on file   Transportation Needs: Not on file   Physical Activity: Not on file   Stress: Not on file   Social Connections: Not on file   Intimate Partner Violence: Not on file   Housing Stability: Not on file     Current Outpatient Medications on File Prior to Visit   Medication Sig    ADVAIR DISKUS 250-50 MCG/DOSE inhaler daily    amLODIPine (NORVASC) 2.5 mg tablet amlodipine 2.5 mg tablet   TAKE ONE TABLET BY MOUTH EVERY DAY atorvastatin (LIPITOR) 40 mg tablet daily    Cholecalciferol (Vitamin D3) 50 MCG (2000 UT) capsule Take by mouth    Dulaglutide (Trulicity) 0.29 GW/6.3AU SOPN Trulicity 8.76 MM/0.9 mL subcutaneous pen injector    Eliquis 5 MG     furosemide (LASIX) 20 mg tablet Take 20 mg by mouth daily    ipratropium-albuterol (COMBIVENT RESPIMAT) inhaler as needed    Metoprolol-HCTZ -12.5 MG TB24 Take 1 tablet by mouth 2 (two) times a day    multivitamin-minerals (CENTRUM) tablet Take 1 tablet by mouth daily    olmesartan (BENICAR) 40 mg tablet olmesartan 40 mg tablet    pantoprazole (PROTONIX) 40 mg tablet Take 40 mg by mouth daily    vitamin B-12 (CYANOCOBALAMIN) 50 MCG TABS Place 1 tablet under the tongue daily    Zinc 10 MG LOZG Take by mouth    [DISCONTINUED] metFORMIN (GLUCOPHAGE) 500 mg tablet 2 (two) times a day    [DISCONTINUED] Ipratropium-Albuterol (COMBIVENT IN) Combivent 18 mcg-103 mcg-/inh inhalation aerosol; 2 puff(s) inhaled once a day Quantity: 0 Refills: 0 Ordered: 6-Feb-2023 Laudati, CarissaGeneric Substitution Allowed    [DISCONTINUED] metoprolol succinate (TOPROL-XL) 50 mg 24 hr tablet     [DISCONTINUED] predniSONE 10 mg tablet Take 60mg x3 days, 40mg x3 days, 20mg x3 days, 10mg x3 days    [DISCONTINUED] triamcinolone (KENALOG) 0.1 % cream APPLY TOPICALLY TWO TIMES A DAY A THIN LAYER TO THE AFFECTED AREA AS NEEDED (Patient not taking: Reported on 8/15/2022)     Allergies   Allergen Reactions    Celecoxib Palpitations    Clarithromycin     Rofecoxib Palpitations and Other (See Comments)     Heart palpations. Other reaction(s): Erythema    All salmon inhibitors.      Immunization History   Administered Date(s) Administered    COVID-19 MODERNA VACC 0.5 ML IM 08/18/2021    Influenza, high dose seasonal 0.7 mL 11/30/2023    Tdap 11/14/2016    Zoster Vaccine Recombinant 02/23/2020       Objective     /70 (BP Location: Right arm, Patient Position: Sitting, Cuff Size: Adult)   Pulse 77   Temp 98 °F (36.7 °C) (Tympanic)   Ht 6' 3" (1.905 m)   Wt 117 kg (259 lb)   SpO2 95%   BMI 32.37 kg/m²     Physical Exam  Constitutional:       Appearance: Normal appearance. Cardiovascular:      Rate and Rhythm: Normal rate and regular rhythm. Pulses: Normal pulses. Heart sounds: Normal heart sounds. Pulmonary:      Effort: Pulmonary effort is normal.      Breath sounds: Normal breath sounds. Musculoskeletal:         General: Normal range of motion. Neurological:      General: No focal deficit present. Mental Status: He is alert and oriented to person, place, and time.        Antoinette Becerra MD

## 2023-11-30 NOTE — PATIENT INSTRUCTIONS
1. Type 2 diabetes mellitus without complication, without long-term current use of insulin (HCC)  -     metFORMIN (GLUCOPHAGE) 500 mg tablet; Take 2 in AM one In PM  -     Hemoglobin A1C; Future; Expected date: 11/30/2023  -     Albumin / creatinine urine ratio; Future; Expected date: 11/30/2023  -     Lipid Panel with Direct LDL reflex; Future; Expected date: 11/30/2023    2. CLL (chronic lymphocytic leukemia) ([facility])    3. Paroxysmal atrial fibrillation ([address])    4. Charcot foot due to diabetes mellitus ([address])    5. Coronary artery disease involving native coronary artery without angina pectoris, unspecified whether native or transplanted heart  -     Ambulatory Referral to Cardiology; Future    6. Vitamin D deficiency  -     Vitamin D 25 hydroxy; Future    7. Vaccine counseling    8.  Encounter for immunization  -     influenza vaccine, high-dose, PF 0.7 mL (FLUZONE HIGH-DOSE)

## 2023-11-30 NOTE — ASSESSMENT & PLAN NOTE
Lab Results   Component Value Date    HGBA1C 7.1 (H) 04/05/2023   Recek labs continue medication as prescribed at this time

## 2023-12-03 ENCOUNTER — PATIENT MESSAGE (OUTPATIENT)
Dept: FAMILY MEDICINE CLINIC | Facility: CLINIC | Age: 73
End: 2023-12-03

## 2023-12-03 DIAGNOSIS — E11.9 TYPE 2 DIABETES MELLITUS WITHOUT COMPLICATION, WITHOUT LONG-TERM CURRENT USE OF INSULIN (HCC): Primary | ICD-10-CM

## 2023-12-04 ENCOUNTER — EVALUATION (OUTPATIENT)
Dept: PHYSICAL THERAPY | Facility: CLINIC | Age: 73
End: 2023-12-04
Payer: MEDICARE

## 2023-12-04 VITALS — SYSTOLIC BLOOD PRESSURE: 160 MMHG | DIASTOLIC BLOOD PRESSURE: 90 MMHG

## 2023-12-04 DIAGNOSIS — M54.50 ACUTE RIGHT-SIDED LOW BACK PAIN WITHOUT SCIATICA: ICD-10-CM

## 2023-12-04 PROCEDURE — 97162 PT EVAL MOD COMPLEX 30 MIN: CPT | Performed by: PHYSICAL THERAPIST

## 2023-12-04 PROCEDURE — 97110 THERAPEUTIC EXERCISES: CPT | Performed by: PHYSICAL THERAPIST

## 2023-12-04 PROCEDURE — 97140 MANUAL THERAPY 1/> REGIONS: CPT | Performed by: PHYSICAL THERAPIST

## 2023-12-04 NOTE — PROGRESS NOTES
PT Evaluation     Today's date: 2023  Patient name: Frantz Thompson  : 1950  MRN: [id number]  Referring provider: Santiago Peterson MD  Dx:   Encounter Diagnosis     ICD-10-CM    1. Acute right-sided low back pain without sciatica  M54.50 Ambulatory referral to PT spine                     Assessment  Assessment details: Pt is a 75y.o. year old male presenting to physical therapy for Acute right-sided low back pain without sciatica. Pt presents via comp spine program and is moderate risk based on Start Back assessment tool. He presents with the following impairments: limited lumbar ROM, hypomobility, hypertonic paraspinals, TTP in R PSIS, and LE weakness affecting their function with squatting, bending, lifting, twisting, walking, standing long periods, and sitting long periods. Pt will benefit from skilled physical therapy with mobility based program to address functional limitations noted in evaluation and meet patient goals. Impairments: abnormal or restricted ROM, abnormal movement, activity intolerance, impaired physical strength, lacks appropriate home exercise program, pain with function, poor posture  and poor body mechanics    Symptom irritability: moderate  Goals  ST. Pt will reduce pain to 2/10. 2. Pt will improve lumbar rotation to min deficits. LT. Pt will improve lumbar SB to nil deficits for improved ability to pick things up off ground. 2. Pt will demonstrate good squatting mechanics for safe lifting. 3. Pt will meet projected FOTO score.     Plan  Patient would benefit from: PT eval and skilled physical therapy  Planned modality interventions: biofeedback, cryotherapy, electrical stimulation/Russian stimulation, TENS and thermotherapy: hydrocollator packs  Planned therapy interventions: abdominal trunk stabilization, joint mobilization, manual therapy, behavior modification, body mechanics training, neuromuscular re-education, patient education, strengthening, stretching, therapeutic activities, therapeutic exercise, flexibility, functional ROM exercises and home exercise program  Frequency: 2x week  Duration in weeks: 6  Treatment plan discussed with: patient and family        Subjective Evaluation    History of Present Illness  Mechanism of injury: Pt reports he has been deconditioned ever since covid began and he stopped rehab/exercising for his knees. He also reports recently going trout fishing all day and was standing or in the car from 6am until 11pm and his back was in a lot of pain the following day. He also went to [place] recently and needed a WC for the airport due to back pain. His back pain has been intermittent ever since falling while climbing a mountain in the [employer]. MRI and recent CT scan show Hx of old anterolisthesis and pars fracture as well as many degenerative changes in lumbar spine. He denies any recent falls or injuries and has no numbness or tingling in LE's. He has some difficulty lifting water jugs at home. Recurrent probem    Pain  Current pain ratin  At worst pain rating: 10          Objective     Palpation   Left   Hypertonic in the erector spinae and lumbar paraspinals. Right   Hypertonic in the erector spinae and lumbar paraspinals. Tenderness     Left Hip   Tenderness in the PSIS. Right Hip   Tenderness in the PSIS.      Active Range of Motion     Lumbar   Flexion:  Restriction level: minimal  Extension:  with pain Restriction level: maximal  Left lateral flexion:  Restriction level: moderate  Right lateral flexion:  with pain Restriction level: maximal  Left rotation:  with pain Restriction level: maximal  Right rotation:  with pain Restriction level: maximal    Joint Play     Hypomobile: T8, T9, T10, T11, T12, L1, L2, L3, L4, L5 and S1     Strength/Myotome Testing     Left Hip   Planes of Motion   Flexion: 4  Abduction: 4+    Right Hip   Planes of Motion   Flexion: 4  Abduction: 4+    Left Knee   Flexion: 4  Extension: 5    Right Knee   Flexion: 4+  Extension: 5    Tests     Lumbar     Left   Negative passive SLR and slump test.     Right   Negative passive SLR and slump test.     Left Hip   Negative ROGER and FADIR. Right Hip   Positive FADIR. Negative ROGER. Precautions:  Mod risk    Date 12/4            Visit # IE            FOTO IE             Re-eval IE              Manuals             Monitor /90            Lumbar Gapping SF                                      Neuro Re-Ed             Clams             Dead Bugs 10x            SB TA press                                                                 Ther Ex             Bike             Open book 2x15"            Piriformis str 2x15"            LTR             Hamstring str             SB rolling             Standing hip ABD & Ext             SLR                          Ther Activity             Squat             UTR                                       Gait Training                                       Modalities

## 2023-12-05 NOTE — TELEPHONE ENCOUNTER
Additional Information  • Negative: Is this related to a work injury? • Negative: Is this related to an MVA? • Negative: Are you currently recieving homecare services? • Affirmative: Is this a chronic condition? • Negative: Is the patient experiencing blood in sputum? • Negative: Is the patient experiencing acute drop foot or paralysis? • Negative: Has the patient experienced major trauma? (fall from height, high speed collision, direct blow to spine) and is also experiencing nausea, light-headedness, or loss of consciousness? • Negative: Is the patient experiencing urine retention? • Negative: Does the patient have a fever? • Negative: Has the patient had unexplained weight loss?     Protocols used: Charles5 CASS Carmichael

## 2023-12-11 ENCOUNTER — OFFICE VISIT (OUTPATIENT)
Dept: PHYSICAL THERAPY | Facility: CLINIC | Age: 73
End: 2023-12-11
Payer: MEDICARE

## 2023-12-11 DIAGNOSIS — M54.50 ACUTE RIGHT-SIDED LOW BACK PAIN WITHOUT SCIATICA: Primary | ICD-10-CM

## 2023-12-11 PROCEDURE — 97112 NEUROMUSCULAR REEDUCATION: CPT | Performed by: PHYSICAL THERAPIST

## 2023-12-11 PROCEDURE — 97110 THERAPEUTIC EXERCISES: CPT | Performed by: PHYSICAL THERAPIST

## 2023-12-11 NOTE — PROGRESS NOTES
Daily Note     Today's date: 2023  Patient name: Keturah Pierce  : 1950  MRN: [id number]  Referring provider: Jesus Phan MD  Dx:   Encounter Diagnosis     ICD-10-CM    1. Acute right-sided low back pain without sciatica  M54.50                      Subjective: Pt reports feeling very fatigued after completing his HEP. Objective: See treatment diary below      Assessment:  Pt is challenged w progression of today's session, but has no increase in pain. He has lots of stiffness present with SB rolling and open book, and is limited by poor shoulder mobility. He is challenged w squatting, needing cues for form as well as clamshells. Patient demonstrated fatigue post treatment and would benefit from continued PT. Plan: Continue per plan of care. Progress treatment as tolerated. Precautions:  Mod risk    Date            Visit # IE 2           FOTO IE             Re-eval IE              Manuals             Monitor /90            Lumbar Gapping SF SF                                     Neuro Re-Ed             Clams  2x10 GTB           Dead Bugs 10x 15x           SB TA press                                                                 Ther Ex             Bike  6'           Open book 2x15" 3x15"           Piriformis str 2x15" 3x15"           LTR             Hamstring str  3x15"           SB rolling  5x5" 3 way           Standing hip ABD & Ext  20x ABD GTB           SLR  15x           HF str  3x15"           Ther Activity             Squat  10x5"           UTR                                       Gait Training                                       Modalities

## 2023-12-14 ENCOUNTER — OFFICE VISIT (OUTPATIENT)
Dept: PHYSICAL THERAPY | Facility: CLINIC | Age: 73
End: 2023-12-14
Payer: MEDICARE

## 2023-12-14 DIAGNOSIS — M54.50 ACUTE RIGHT-SIDED LOW BACK PAIN WITHOUT SCIATICA: Primary | ICD-10-CM

## 2023-12-14 PROCEDURE — 97112 NEUROMUSCULAR REEDUCATION: CPT | Performed by: PHYSICAL THERAPIST

## 2023-12-14 PROCEDURE — 97110 THERAPEUTIC EXERCISES: CPT | Performed by: PHYSICAL THERAPIST

## 2023-12-14 PROCEDURE — 97530 THERAPEUTIC ACTIVITIES: CPT | Performed by: PHYSICAL THERAPIST

## 2023-12-14 NOTE — PROGRESS NOTES
Daily Note     Today's date: 2023  Patient name: Maury Nino  : 1950  MRN: [id number]  Referring provider: Radhames Giordano MD  Dx:   Encounter Diagnosis     ICD-10-CM    1. Acute right-sided low back pain without sciatica  M54.50                      Subjective: Pt reports feeling sore after last session. Objective: See treatment diary below      Assessment: Pt is challenged appropriately during today's session especially with addition of UTR. Patient demonstrated fatigue post treatment and would benefit from continued PT. Plan: Continue per plan of care. Progress treatment as tolerated. Precautions:  Mod risk    Date           Visit # IE 2 3          FOTO IE             Re-eval IE              Manuals            Monitor /90            Lumbar Gapping SF SF SF                                    Neuro Re-Ed             Clams  2x10 GTB 2x10 GTB          Dead Bugs 10x 15x 2x20          SB TA press             TA bracing   5x10" w add                                                 Ther Ex             Bike  6' 6'          Open book 2x15" 3x15" 3x20"          Piriformis str 2x15" 3x15" 3x20"          LTR             Hamstring str  3x15" 3x20"          SB rolling  5x5" 3 way           Standing hip ABD & Ext  20x ABD GTB 20x ABD GTB          SLR  15x 20x          HF str  3x15" 3x15"          Ther Activity             Squat  10x5" 10x STS          UTR   20x 6#                                    Gait Training                                       Modalities

## 2023-12-18 ENCOUNTER — OFFICE VISIT (OUTPATIENT)
Dept: PHYSICAL THERAPY | Facility: CLINIC | Age: 73
End: 2023-12-18
Payer: MEDICARE

## 2023-12-18 DIAGNOSIS — M54.50 ACUTE RIGHT-SIDED LOW BACK PAIN WITHOUT SCIATICA: Primary | ICD-10-CM

## 2023-12-18 PROCEDURE — 97112 NEUROMUSCULAR REEDUCATION: CPT | Performed by: PHYSICAL THERAPIST

## 2023-12-18 PROCEDURE — 97110 THERAPEUTIC EXERCISES: CPT | Performed by: PHYSICAL THERAPIST

## 2023-12-18 NOTE — PROGRESS NOTES
"Daily Note     Today's date: 2023  Patient name: Cam Wilkins  : 1950  MRN: [id number]  Referring provider: Rosalie Pina MD  Dx:   Encounter Diagnosis     ICD-10-CM    1. Acute right-sided low back pain without sciatica  M54.50                      Subjective: Pt reports some burning in his left knee prior to his session today.      Objective: See treatment diary below      Assessment:  Pt does well w addition of bridges and leg press today w no increases in knee pain.  He is challenged w today's session, but does not complete squats due to knee pain. Patient demonstrated fatigue post treatment and would benefit from continued PT.      Plan: Continue per plan of care.  Progress treatment as tolerated.       Precautions: Mod risk    Date          Visit # IE 2 3 4         FOTO IE             Re-eval IE              Manuals           Monitor /90            Lumbar Gapping SF SF SF                                    Neuro Re-Ed             Clams  2x10 GTB 2x10 GTB 2x15 GTB (progress to blue NV)         Dead Bugs 10x 15x 2x20 2x15         SB TA press             TA bracing   5x10\" w add 10x10\" w add         Bridges    15x5\"                                   Ther Ex            Bike  6' 6' 6'         Open book 2x15\" 3x15\" 3x20\" 3x15  \"         Piriformis str 2x15\" 3x15\" 3x20\" 3x15\"         LTR             Hamstring str  3x15\" 3x20\"          SB rolling  5x5\" 3 way           Standing hip ABD & Ext  20x ABD GTB 20x ABD GTB 20x BTB         SLR  15x 20x 20x 2#         HF str  3x15\" 3x15\" 3x15\"         Leg press    3x10 65#                      Ther Activity             Squat  10x5\" 10x STS 10x5\"         UTR   20x 6#                                    Gait Training                                       Modalities                                                "

## 2023-12-21 ENCOUNTER — OFFICE VISIT (OUTPATIENT)
Dept: PHYSICAL THERAPY | Facility: CLINIC | Age: 73
End: 2023-12-21
Payer: MEDICARE

## 2023-12-21 DIAGNOSIS — M54.50 ACUTE RIGHT-SIDED LOW BACK PAIN WITHOUT SCIATICA: Primary | ICD-10-CM

## 2023-12-21 PROCEDURE — 97110 THERAPEUTIC EXERCISES: CPT | Performed by: PHYSICAL THERAPIST

## 2023-12-21 PROCEDURE — 97112 NEUROMUSCULAR REEDUCATION: CPT | Performed by: PHYSICAL THERAPIST

## 2023-12-21 PROCEDURE — 97530 THERAPEUTIC ACTIVITIES: CPT | Performed by: PHYSICAL THERAPIST

## 2023-12-21 NOTE — PROGRESS NOTES
"Daily Note     Today's date: 2023  Patient name: Cam Wilkins  : 1950  MRN: [id number]  Referring provider: Rosalie Pina MD  Dx:   Encounter Diagnosis     ICD-10-CM    1. Acute right-sided low back pain without sciatica  M54.50                      Subjective: Pt reports he is doing well today, but took the past few days off from doing his HEP.      Objective: See treatment diary below      Assessment: Pt has improved rotation w UTR following trial of pulleys as his shoulder ROM limits him with certain exercises.  He does well w progression of LE strengthening and core stability exercises, but has pain in his knees with trial of lunges.  He does well w progression to purple TB for clams, but is challenged w dead bugs and trial of curl ups.  Patient demonstrated fatigue post treatment and would benefit from continued PT.      Plan: Continue per plan of care.  Progress treatment as tolerated.       Precautions: Mod risk    Date         Visit # IE 2 3 4 5        FOTO IE     xx        Re-eval IE              Manuals          Monitor /90            Lumbar Gapping SF SF SF  SF                                  Neuro Re-Ed            Clams  2x10 GTB 2x10 GTB 2x15 GTB (progress to blue NV) 2x10 purple        Dead Bugs 10x 15x 2x20 2x15 2x20        SB TA press             TA bracing   5x10\" w add 10x10\" w add 10x10\" w add        Bridges    15x5\" 20x5\"        Curl ups     10x3\" YMB                     Ther Ex           Bike  6' 6' 6' 6'        Pulleys     3'        Open book 2x15\" 3x15\" 3x20\" 3x15  \" 3x15\"        Piriformis str 2x15\" 3x15\" 3x20\" 3x15\" 3x15\"        LTR             Hamstring str  3x15\" 3x20\"  3x15\"        SB rolling  5x5\" 3 way           Standing hip ABD & Ext  20x ABD GTB 20x ABD GTB 20x BTB 2x15 purple        SLR  15x 20x 20x 2#         HF str  3x15\" 3x15\" 3x15\" 3x15\"        Leg press    3x10 65# 3x10 95#  " "                   Ther Activity             Squat  10x5\" 10x STS 10x5\" 2x10        UTR   20x 6#  20x 6#        Lunges     **                     Gait Training                                       Modalities                                                  "

## 2023-12-28 ENCOUNTER — OFFICE VISIT (OUTPATIENT)
Dept: PHYSICAL THERAPY | Facility: CLINIC | Age: 73
End: 2023-12-28
Payer: MEDICARE

## 2023-12-28 DIAGNOSIS — M54.50 ACUTE RIGHT-SIDED LOW BACK PAIN WITHOUT SCIATICA: Primary | ICD-10-CM

## 2023-12-28 PROCEDURE — 97110 THERAPEUTIC EXERCISES: CPT | Performed by: PHYSICAL THERAPIST

## 2023-12-28 PROCEDURE — 97112 NEUROMUSCULAR REEDUCATION: CPT | Performed by: PHYSICAL THERAPIST

## 2023-12-28 PROCEDURE — 97530 THERAPEUTIC ACTIVITIES: CPT | Performed by: PHYSICAL THERAPIST

## 2023-12-28 NOTE — PROGRESS NOTES
"Daily Note     Today's date: 2023  Patient name: Cam Wilkins  : 1950  MRN: [id number]  Referring provider: Rosalie Pina MD  Dx:   Encounter Diagnosis     ICD-10-CM    1. Acute right-sided low back pain without sciatica  M54.50                      Subjective: Pt reports doing well today and enjoyed his holiday.      Objective: See treatment diary below      Assessment: Pt does well resuming today's session and increases workload as well as increased weight on the leg press.  He continues to have stiffness with open book str and gapping that is worse on the left side. Patient demonstrated fatigue post treatment and would benefit from continued PT.      Plan: Continue per plan of care.  Progress treatment as tolerated.       Precautions: Mod risk    Date        Visit # IE 2 3 4 5 6       FOTO IE     xx        Re-eval IE              Manuals         Monitor /90            Lumbar Gapping SF SF SF  SF SF                                 Neuro Re-Ed           Clams  2x10 GTB 2x10 GTB 2x15 GTB (progress to blue NV) 2x10 purple 2x10 purple       Dead Bugs 10x 15x 2x20 2x15 2x20        SB TA press             TA bracing   5x10\" w add 10x10\" w add 10x10\" w add        Bridges    15x5\" 20x5\" 20x purple       Curl ups     10x3\" YMB 2x10 YMB                    Ther Ex          Bike  6' 6' 6' 6' 8'       Pulleys     3' 3'       Open book 2x15\" 3x15\" 3x20\" 3x15  \" 3x15\" 3x15\"       Piriformis str 2x15\" 3x15\" 3x20\" 3x15\" 3x15\" 3x15\"       LTR             Hamstring str  3x15\" 3x20\"  3x15\"        SB rolling  5x5\" 3 way           Standing hip ABD & Ext  20x ABD GTB 20x ABD GTB 20x BTB 2x15 purple 2x15 purple       SLR  15x 20x 20x 2#  2x15       HF str  3x15\" 3x15\" 3x15\" 3x15\" 3x15\"       Leg press    3x10 65# 3x10 95# 3x10 115#                    Ther Activity             Squat  10x5\" 10x STS 10x5\" 2x10 " 2x10       UTR   20x 6#  20x 6# 2x15 7#       Lunges     **                     Gait Training                                       Modalities

## 2024-01-02 ENCOUNTER — OFFICE VISIT (OUTPATIENT)
Dept: PHYSICAL THERAPY | Facility: CLINIC | Age: 74
End: 2024-01-02
Payer: MEDICARE

## 2024-01-02 DIAGNOSIS — M54.50 ACUTE RIGHT-SIDED LOW BACK PAIN WITHOUT SCIATICA: Primary | ICD-10-CM

## 2024-01-02 PROCEDURE — 97110 THERAPEUTIC EXERCISES: CPT

## 2024-01-02 PROCEDURE — 97140 MANUAL THERAPY 1/> REGIONS: CPT

## 2024-01-02 PROCEDURE — 97112 NEUROMUSCULAR REEDUCATION: CPT

## 2024-01-02 NOTE — PROGRESS NOTES
"Daily Note     Today's date: 2024  Patient name: Cam Wilkins  : 1950  MRN: [id number]  Referring provider: Rosalie Pina MD  Dx:   Encounter Diagnosis     ICD-10-CM    1. Acute right-sided low back pain without sciatica  M54.50           Start Time: 1100  Stop Time: 1200  Total time in clinic (min): 60 minutes    Subjective: Pt reports doing well today states he did the pulleys prior to coming to therapy today. Pt states he feels the shoulder is limiting him the most.      Objective: See treatment diary below      Assessment: Pt tolerated today's session well with no adverse symptoms. Pt continues to work towards goals of increased core strength and ROM. Patient demonstrated fatigue post treatment and would benefit from continued PT. Continue to progress as able.       Plan: Continue per plan of care.  Progress treatment as tolerated.       Precautions: Mod risk    Date       Visit # IE 2 3 4 5 6 7      FOTO IE     xx        Re-eval IE              Manuals        Monitor /90            Lumbar Gapping SF SF SF  SF SF SF             PROM  HY                   Neuro Re-Ed    2      Clams  2x10 GTB 2x10 GTB 2x15 GTB (progress to blue NV) 2x10 purple 2x10 purple 2x10 purple      Dead Bugs 10x 15x 2x20 2x15 2x20        SB TA press             TA bracing   5x10\" w add 10x10\" w add 10x10\" w add        Bridges    15x5\" 20x5\" 20x purple 20x purple      Curl ups     10x3\" YMB 2x10 YMB 2x10 YMB                   Ther Ex   2      Bike  6' 6' 6' 6' 8' 8'      Pulleys     3' 3' @ home today      Open book 2x15\" 3x15\" 3x20\" 3x15  \" 3x15\" 3x15\" 3x15\"      Piriformis str 2x15\" 3x15\" 3x20\" 3x15\" 3x15\" 3x15\" 3x15\"      LTR             Hamstring str  3x15\" 3x20\"  3x15\"        SB rolling  5x5\" 3 way           Standing hip ABD & Ext  20x ABD GTB 20x ABD GTB 20x BTB 2x15 purple 2x15 purple 2x15 " "purple      SLR  15x 20x 20x 2#  2x15 2x15      HF str  3x15\" 3x15\" 3x15\" 3x15\" 3x15\" 3x15\"      Leg press    3x10 65# 3x10 95# 3x10 115# nv                   Ther Activity       1/2      Squat  10x5\" 10x STS 10x5\" 2x10 2x10 2x10      UTR   20x 6#  20x 6# 2x15 7# 2x15 7#      Lunges     **                     Gait Training                                       Modalities                                                    "

## 2024-01-04 ENCOUNTER — OFFICE VISIT (OUTPATIENT)
Dept: PHYSICAL THERAPY | Facility: CLINIC | Age: 74
End: 2024-01-04
Payer: MEDICARE

## 2024-01-04 DIAGNOSIS — M54.50 ACUTE RIGHT-SIDED LOW BACK PAIN WITHOUT SCIATICA: Primary | ICD-10-CM

## 2024-01-04 PROCEDURE — 97530 THERAPEUTIC ACTIVITIES: CPT | Performed by: PHYSICAL THERAPIST

## 2024-01-04 PROCEDURE — 97112 NEUROMUSCULAR REEDUCATION: CPT | Performed by: PHYSICAL THERAPIST

## 2024-01-04 PROCEDURE — 97110 THERAPEUTIC EXERCISES: CPT | Performed by: PHYSICAL THERAPIST

## 2024-01-04 NOTE — PROGRESS NOTES
"Daily Note     Today's date: 2024  Patient name: Cam Wilkins  : 1950  MRN: [id number]  Referring provider: Rosalie Pina MD  Dx:   Encounter Diagnosis     ICD-10-CM    1. Acute right-sided low back pain without sciatica  M54.50                      Subjective: Pt reports feeling good after last session, but still has some R shoulder pain and stiffness that limits him from doing some of his back exercises and stretches.      Objective: See treatment diary below      Assessment:  Pt does well w progression of today's session and is challenged appropriately.  He is challenged w progression of curl up and leg press exercises.   He requires some cueing for proper execution of squat to row.  Patient demonstrated fatigue post treatment and would benefit from continued PT.      Plan: Continue per plan of care.  Progress treatment as tolerated.       Precautions: Mod risk    Date      Visit # IE 2 3 4 5 6 7 8     FOTO IE     xx        Re-eval IE              Manuals       Monitor /90            Lumbar Gapping SF SF SF  SF SF SF SF            PROM  HY                   Neuro Re-Ed         Clams  2x10 GTB 2x10 GTB 2x15 GTB (progress to blue NV) 2x10 purple 2x10 purple 2x10 purple 2x15 purple     Dead Bugs 10x 15x 2x20 2x15 2x20        SB TA press             TA bracing   5x10\" w add 10x10\" w add 10x10\" w add        Bridges    15x5\" 20x5\" 20x purple 20x purple 20x purple     Curl ups     10x3\" YMB 2x10 YMB 2x10 YMB 2x10 BMB                  Ther Ex        Bike  6' 6' 6' 6' 8' 8' 8'     Pulleys     3' 3' @ home today 3'     Open book 2x15\" 3x15\" 3x20\" 3x15  \" 3x15\" 3x15\" 3x15\" 3x15\"     Piriformis str 2x15\" 3x15\" 3x20\" 3x15\" 3x15\" 3x15\" 3x15\"      LTR             Hamstring str  3x15\" 3x20\"  3x15\"        SB rolling  5x5\" 3 way           Standing hip ABD & Ext  20x ABD GTB " "20x ABD GTB 20x BTB 2x15 purple 2x15 purple 2x15 purple 2x15 purple     SLR  15x 20x 20x 2#  2x15 2x15 2x15     HF str  3x15\" 3x15\" 3x15\" 3x15\" 3x15\" 3x15\" 3x15\"     Leg press    3x10 65# 3x10 95# 3x10 115# nv 3x10 135#                  Ther Activity       1/2 1/4     Squat  10x5\" 10x STS 10x5\" 2x10 2x10 2x10 3x10 30# squat to row     UTR   20x 6#  20x 6# 2x15 7# 2x15 7# 20x 7#     Lunges     **                     Gait Training                                       Modalities                                                      "

## 2024-01-08 DIAGNOSIS — J45.20 MILD INTERMITTENT ASTHMA WITHOUT COMPLICATION: Primary | ICD-10-CM

## 2024-01-08 NOTE — TELEPHONE ENCOUNTER
Patient's spouse l/m on Rx Line requesting:  Fluticasone-Salmeterol -50 mcg/dose  3 month supply requested  Pharmacy: [facility] [address]

## 2024-01-09 ENCOUNTER — OFFICE VISIT (OUTPATIENT)
Dept: PHYSICAL THERAPY | Facility: CLINIC | Age: 74
End: 2024-01-09
Payer: MEDICARE

## 2024-01-09 DIAGNOSIS — M54.50 ACUTE RIGHT-SIDED LOW BACK PAIN WITHOUT SCIATICA: Primary | ICD-10-CM

## 2024-01-09 PROCEDURE — 97110 THERAPEUTIC EXERCISES: CPT | Performed by: PHYSICAL THERAPIST

## 2024-01-09 PROCEDURE — 97112 NEUROMUSCULAR REEDUCATION: CPT | Performed by: PHYSICAL THERAPIST

## 2024-01-09 RX ORDER — FLUTICASONE PROPIONATE AND SALMETEROL 250; 50 UG/1; UG/1
1 POWDER RESPIRATORY (INHALATION) 2 TIMES DAILY
Qty: 60 BLISTER | Refills: 2 | Status: SHIPPED | OUTPATIENT
Start: 2024-01-09

## 2024-01-09 NOTE — PROGRESS NOTES
"Daily Note     Today's date: 2024  Patient name: Cam Wilkins  : 1950  MRN: [id number]  Referring provider: Rosalie Pina MD  Dx:   Encounter Diagnosis     ICD-10-CM    1. Acute right-sided low back pain without sciatica  M54.50                      Subjective: Patient offers no new complaints. He mentions he feels as though he is getting better.       Objective: See treatment diary below      Assessment: Tolerated treatment well. Continued with program as outlined below. Patient demonstrated fatigue post treatment, exhibited good technique with therapeutic exercises, and would benefit from continued PT      Plan: Continue per plan of care.  Progress treatment as tolerated.       Precautions: Mod risk    Date     Visit # IE 2 3 4 5 6 7 8 9    FOTO IE     xx        Re-eval IE              Manuals      Monitor /90            Lumbar Gapping SF SF SF  SF SF SF SF SF           PROM  HY                   Neuro Re-Ed        Clams  2x10 GTB 2x10 GTB 2x15 GTB (progress to blue NV) 2x10 purple 2x10 purple 2x10 purple 2x15 purple 2x15 purple    Dead Bugs 10x 15x 2x20 2x15 2x20        SB TA press             TA bracing   5x10\" w add 10x10\" w add 10x10\" w add        Bridges    15x5\" 20x5\" 20x purple 20x purple 20x purple 20x purple    Curl ups     10x3\" YMB 2x10 YMB 2x10 YMB 2x10 BMB 2x10 BMB                 Ther Ex       Bike  6' 6' 6' 6' 8' 8' 8' 8'    Pulleys     3' 3' @ home today 3' @ home    Open book 2x15\" 3x15\" 3x20\" 3x15  \" 3x15\" 3x15\" 3x15\" 3x15\" 3x15\"    Piriformis str 2x15\" 3x15\" 3x20\" 3x15\" 3x15\" 3x15\" 3x15\"      LTR             Hamstring str  3x15\" 3x20\"  3x15\"        SB rolling  5x5\" 3 way           Standing hip ABD & Ext  20x ABD GTB 20x ABD GTB 20x BTB 2x15 purple 2x15 purple 2x15 purple 2x15 purple 2x15 purple    SLR  15x 20x 20x 2#  2x15 " "2x15 2x15 2x15    HF str  3x15\" 3x15\" 3x15\" 3x15\" 3x15\" 3x15\" 3x15\" 3x15\"    Leg press    3x10 65# 3x10 95# 3x10 115# nv 3x10 135# 3x10 135#                 Ther Activity       1/2 1/4 1/9    Squat  10x5\" 10x STS 10x5\" 2x10 2x10 2x10 3x10 30# squat to row 3x10 30# squat to row    UTR   20x 6#  20x 6# 2x15 7# 2x15 7# 20x 7# 20x 7# Incr #   Lunges     **                     Gait Training                                       Modalities                                                        "

## 2024-01-11 ENCOUNTER — OFFICE VISIT (OUTPATIENT)
Dept: PHYSICAL THERAPY | Facility: CLINIC | Age: 74
End: 2024-01-11
Payer: MEDICARE

## 2024-01-11 DIAGNOSIS — M54.50 ACUTE RIGHT-SIDED LOW BACK PAIN WITHOUT SCIATICA: Primary | ICD-10-CM

## 2024-01-11 PROCEDURE — 97530 THERAPEUTIC ACTIVITIES: CPT | Performed by: PHYSICAL THERAPIST

## 2024-01-11 PROCEDURE — 97112 NEUROMUSCULAR REEDUCATION: CPT | Performed by: PHYSICAL THERAPIST

## 2024-01-11 PROCEDURE — 97110 THERAPEUTIC EXERCISES: CPT | Performed by: PHYSICAL THERAPIST

## 2024-01-11 NOTE — PROGRESS NOTES
"Daily Note     Today's date: 2024  Patient name: Cam Wilkins  : 1950  MRN: [id number]  Referring provider: Rosalie Pina MD  Dx:   Encounter Diagnosis     ICD-10-CM    1. Acute right-sided low back pain without sciatica  M54.50                      Subjective: Pt reports he is doing well today, and his back was not too sore after last visit.      Objective: See treatment diary below      Assessment:  Pt does well w current program and is challenged w progression.  Pt does well w trial of s/l hip ABD, but needs cueing to maintain s/l position.  He is fatigued w increase in workload during today's session.  Patient demonstrated fatigue post treatment and would benefit from continued PT.      Plan: Continue per plan of care.  Progress treatment as tolerated.       Precautions: Mod risk    Date    Visit # IE 2 3 4 5 6 7 8 9 10   FOTO IE     xx        Re-eval IE              Manuals     Monitor /90            Lumbar Gapping SF SF SF  SF SF SF SF SF SF          PROM  HY                   Neuro Re-Ed       Clams  2x10 GTB 2x10 GTB 2x15 GTB (progress to blue NV) 2x10 purple 2x10 purple 2x10 purple 2x15 purple 2x15 purple 2x15 purple   Dead Bugs 10x 15x 2x20 2x15 2x20        SB TA press             TA bracing   5x10\" w add 10x10\" w add 10x10\" w add        Bridges    15x5\" 20x5\" 20x purple 20x purple 20x purple 20x purple 2x15 purple   Curl ups     10x3\" YMB 2x10 YMB 2x10 YMB 2x10 BMB 2x10 BMB                 Ther Ex      Bike  6' 6' 6' 6' 8' 8' 8' 8' 8'   Pulleys     3' 3' @ home today 3' @ home @ home   Open book 2x15\" 3x15\" 3x20\" 3x15  \" 3x15\" 3x15\" 3x15\" 3x15\" 3x15\" 3x15\"   Piriformis str 2x15\" 3x15\" 3x20\" 3x15\" 3x15\" 3x15\" 3x15\"      LTR             Hamstring str  3x15\" 3x20\"  3x15\"        SB rolling  5x5\" 3 way         " "  Standing hip ABD & Ext  20x ABD GTB 20x ABD GTB 20x BTB 2x15 purple 2x15 purple 2x15 purple 2x15 purple 2x15 purple    SLR  15x 20x 20x 2#  2x15 2x15 2x15 2x15 2x15 2#   S/l hip ABD          2x15 2#   HF str  3x15\" 3x15\" 3x15\" 3x15\" 3x15\" 3x15\" 3x15\" 3x15\" 3x15\"   Leg press    3x10 65# 3x10 95# 3x10 115# nv 3x10 135# 3x10 135# 3x15 145#                Ther Activity       1/2 1/4 1/9 1/11   Squat  10x5\" 10x STS 10x5\" 2x10 2x10 2x10 3x10 30# squat to row 3x10 30# squat to row 3x15 35# squat to row   UTR   20x 6#  20x 6# 2x15 7# 2x15 7# 20x 7# 20x 7# 3x15 8#   Step ups w march     **                     Gait Training                                       Modalities                                                          "

## 2024-01-15 ENCOUNTER — PREP FOR PROCEDURE (OUTPATIENT)
Dept: CARDIOLOGY CLINIC | Facility: CLINIC | Age: 74
End: 2024-01-15

## 2024-01-15 ENCOUNTER — CONSULT (OUTPATIENT)
Dept: CARDIOLOGY CLINIC | Facility: CLINIC | Age: 74
End: 2024-01-15
Payer: MEDICARE

## 2024-01-15 ENCOUNTER — APPOINTMENT (OUTPATIENT)
Dept: PHYSICAL THERAPY | Facility: CLINIC | Age: 74
End: 2024-01-15
Payer: MEDICARE

## 2024-01-15 VITALS
HEIGHT: 75 IN | DIASTOLIC BLOOD PRESSURE: 66 MMHG | BODY MASS INDEX: 32.58 KG/M2 | SYSTOLIC BLOOD PRESSURE: 126 MMHG | HEART RATE: 78 BPM | WEIGHT: 262 LBS

## 2024-01-15 DIAGNOSIS — I31.39 PERICARDIAL EFFUSION: ICD-10-CM

## 2024-01-15 DIAGNOSIS — C91.10 CLL (CHRONIC LYMPHOCYTIC LEUKEMIA) (HCC): ICD-10-CM

## 2024-01-15 DIAGNOSIS — I25.10 CORONARY ARTERY DISEASE INVOLVING NATIVE CORONARY ARTERY WITHOUT ANGINA PECTORIS, UNSPECIFIED WHETHER NATIVE OR TRANSPLANTED HEART: Primary | ICD-10-CM

## 2024-01-15 DIAGNOSIS — Z98.890 S/P PERICARDIOCENTESIS: ICD-10-CM

## 2024-01-15 DIAGNOSIS — I10 PRIMARY HYPERTENSION: ICD-10-CM

## 2024-01-15 DIAGNOSIS — I31.9 PERICARDITIS, UNSPECIFIED CHRONICITY, UNSPECIFIED TYPE: ICD-10-CM

## 2024-01-15 DIAGNOSIS — E78.2 MIXED HYPERLIPIDEMIA: ICD-10-CM

## 2024-01-15 DIAGNOSIS — E11.9 TYPE 2 DIABETES MELLITUS WITHOUT COMPLICATION, WITHOUT LONG-TERM CURRENT USE OF INSULIN (HCC): ICD-10-CM

## 2024-01-15 DIAGNOSIS — I47.10 SVT (SUPRAVENTRICULAR TACHYCARDIA): ICD-10-CM

## 2024-01-15 DIAGNOSIS — I48.0 PAROXYSMAL ATRIAL FIBRILLATION (HCC): ICD-10-CM

## 2024-01-15 DIAGNOSIS — I25.10 CORONARY ARTERY DISEASE INVOLVING NATIVE HEART, UNSPECIFIED VESSEL OR LESION TYPE, UNSPECIFIED WHETHER ANGINA PRESENT: Primary | ICD-10-CM

## 2024-01-15 PROCEDURE — 93000 ELECTROCARDIOGRAM COMPLETE: CPT | Performed by: STUDENT IN AN ORGANIZED HEALTH CARE EDUCATION/TRAINING PROGRAM

## 2024-01-15 PROCEDURE — 99204 OFFICE O/P NEW MOD 45 MIN: CPT | Performed by: STUDENT IN AN ORGANIZED HEALTH CARE EDUCATION/TRAINING PROGRAM

## 2024-01-15 RX ORDER — ASPIRIN 81 MG/1
324 TABLET, CHEWABLE ORAL ONCE
OUTPATIENT
Start: 2024-01-15 | End: 2024-01-15

## 2024-01-15 RX ORDER — METOPROLOL SUCCINATE 50 MG/1
50 TABLET, EXTENDED RELEASE ORAL 2 TIMES DAILY
COMMUNITY
Start: 2023-12-18

## 2024-01-15 RX ORDER — SODIUM, POTASSIUM,MAG SULFATES 17.5-3.13G
SOLUTION, RECONSTITUTED, ORAL ORAL
COMMUNITY
Start: 2024-01-12

## 2024-01-15 NOTE — PROGRESS NOTES
"Daily Note     Today's date: 1/15/2024  Patient name: Cam Wilkins  : 1950  MRN: [id number]  Referring provider: Rosalie Pina MD  Dx: No diagnosis found.               Subjective: ***      Objective: See treatment diary below      Assessment: Patient tolerated treatment session well.       Plan: Continue per POC. Increase reps/resistance as tolerated.      Precautions: Mod risk    Date 1/15 12/4 12/11 12/14 12/18 12/21 12/28 1/2 1/4 1/9 1/11   Visit #  IE 2 3 4 5 6 7 8 9 10   FOTO  IE     xx        Re-eval  IE              Manuals  12/11 12/14 12/18 12/21 12/28 1/2 1/4 1/9 1/11 1/15   Monitor /90             Lumbar Gapping SF SF SF  SF SF SF SF SF SF           PROM  HY                     Neuro Re-Ed    12/18 12/21 12/28 1/2 1/4 1/9 1/11 1/15   Clams  2x10 GTB 2x10 GTB 2x15 GTB (progress to blue NV) 2x10 purple 2x10 purple 2x10 purple 2x15 purple 2x15 purple 2x15 purple    Dead Bugs 10x 15x 2x20 2x15 2x20         SB TA press              TA bracing   5x10\" w add 10x10\" w add 10x10\" w add         Bridges    15x5\" 20x5\" 20x purple 20x purple 20x purple 20x purple 2x15 purple    Curl ups     10x3\" YMB 2x10 YMB 2x10 YMB 2x10 BMB 2x10 BMB                   Ther Ex   12/14 12/18 12/21 12/28 1/2 1/4 1/9 1/11 1/15   Bike  6' 6' 6' 6' 8' 8' 8' 8' 8'    Pulleys     3' 3' @ home today 3' @ home @ home    Open book 2x15\" 3x15\" 3x20\" 3x15  \" 3x15\" 3x15\" 3x15\" 3x15\" 3x15\" 3x15\"    Piriformis str 2x15\" 3x15\" 3x20\" 3x15\" 3x15\" 3x15\" 3x15\"       LTR              Hamstring str  3x15\" 3x20\"  3x15\"         SB rolling  5x5\" 3 way            Standing hip ABD & Ext  20x ABD GTB 20x ABD GTB 20x BTB 2x15 purple 2x15 purple 2x15 purple 2x15 purple 2x15 purple     SLR  15x 20x 20x 2#  2x15 2x15 2x15 2x15 2x15 2#    S/l hip ABD          2x15 2#    HF str  3x15\" 3x15\" 3x15\" 3x15\" 3x15\" 3x15\" 3x15\" 3x15\" 3x15\"    Leg press    3x10 65# 3x10 95# 3x10 115# nv 3x10 135# 3x10 135# 3x15 145#                  Ther Activity       " "1/2 1/4 1/9 1/11 1/15   Squat  10x5\" 10x STS 10x5\" 2x10 2x10 2x10 3x10 30# squat to row 3x10 30# squat to row 3x15 35# squat to row    UTR   20x 6#  20x 6# 2x15 7# 2x15 7# 20x 7# 20x 7# 3x15 8#    Step ups w march     **                       Gait Training                                          Modalities                                                               "

## 2024-01-15 NOTE — PROGRESS NOTES
[facility] CARDIOLOGY [place]  [address] [address]  Phone#  [phone]  Fax#  [phone]  [facility]'s Cardiology Office Consult Visit             NAME: Cam Wilkins  AGE: 73 y.o. SEX: male   : 1950   MRN: [id number]  Assessment and plan:  1. Coronary artery disease involving native coronary artery without angina pectoris, unspecified whether native or transplanted heart  -     Ambulatory Referral to Cardiology  -     POCT ECG  -     Echo complete w/ contrast if indicated; Future; Expected date: 01/15/2024  -     Basic metabolic panel; Future  -     CBC and differential; Future    2. Primary hypertension  -     POCT ECG    3. Paroxysmal atrial fibrillation (HCC)  -     POCT ECG    4. SVT (supraventricular tachycardia)    5. Mixed hyperlipidemia  -     POCT ECG    6. CLL (chronic lymphocytic leukemia) (HCC)    7. S/P pericardiocentesis    8. Pericardial effusion    9. Pericarditis, unspecified chronicity, unspecified type    10. Type 2 diabetes mellitus without complication, without long-term current use of insulin ([facility])    -previous work up:   -10/2022 Stress echo for CORDERO: Nate protocol, 3 min 29 sec, 4.7 METs, 112 %MPHR, dyspnea, hypertensive response, HR demonstarted deconditioned response, EKG with 1 mm downslopping ST depression in inferolateral lead in late recovery, positive for ischemia, SVT with HR in 150s during recovery, LV dilation post stress.   -2020 Cardiac event monitor: min HR of 54 bpm, max HR of 194 bpm, and avg HR of 82 bpm. Predominant underlying rhythm was Sinus Rhythm. 35 Supraventricular Tachycardia runs occurred, the run with the fastest interval lasting 5 beats with a max rate of 194 bpm, the longest lasting 1 min 18 secs with an avg rate of 147 bpm. Isolated SVEs were rare (<1.0%), SVE Couplets were rare (<1.0%), and SVE Triplets were rare (<1.0%). Isolated VEs were occasional (3.4%, 42135), VE Couplets were rare (<1.0%, 295), and VE Triplets were  rare (<1.0%, 1). Ventricular Bigeminy and Trigeminy were present.    -3/2023 extended heart monitor (Zio patch) Analysis time 2 days and 10 hours. Predominantly normal sinus rhythm at an average HR 72 bpm.  frequent runs of SVT. The fastest run was a 10 beat run of PSVT at 184 bpm.  The longest run was a 9-minute and 7-second run of PSVT at 148 minutes.  Review of tracings suggest possible atrial tachycardia.  It appears symptomatic patient event occurred within 45 seconds of noted SVT episodes.  Rare ventricular ectopy.  No significant pauses or heart block.    -2/2022 TTE: normal biV function, LVEF 55-60%, trace MR, trace TR    -CAD: in 2013 had exercise stress during which was concern for ACS thus was taken to [facility] and had PCI to RCA (they dont have their card but RCA is mentioned in the prevous cardiologist's note. Per patient's description he had either dissection or aneurism of coronary artery.  Stress echo from  10/2022 is abnormal with EKG changes suggestive of ischemia, LV dilatation and SVT in recovery. Cardiologist at [facility] and [place] both recommended LHC but patient delcined due to concern for risk associated with procedure. Today after extrensive discussion, agreed to have LHC. For now continue eliquis 5 mg bid, amlodipine 2.5 mg qd, atorvastatin 40 mg qd, metoprolol succinate 50 mg bid. I informed him to hold eliquis 48 hr prior to procedure as well as to hold olmesartan and lasix 1 day prior to procedure and Trulicity 1 week prior to procedure. He will have updates labs. f/u TTE. I recommended to avoid extensive work up including during PT sessions until C.  -BP is controlled. Continue amlodipine 2.5 mg daily, furosemide 20 mg daily, olmesartan 40 mg daily, metoprolol extended release 50 mg twice daily.  -Lipid panel 11/2023: , , HDL 37, LDL 55 (goal LDL under 55) . Dicussed importance of diet changes and weight loss for triglycerides management.  Continue atorvastatin 40 mg daily.  -in normal  sinus rhythm today. Continue apixaban 5 mg bid and metoprolol XL 50 gm bid.   -EKG with prolonged QT. Current medications reviewed. Discussed to be cautious with QT prolonging medications such as antibiotics.  -trivial pericardial effusion on TTE from 2022. Will f/u updated TTE.  -palpitations are less frequent that before. If increase then would repeat heart monitor. Continue metoprolol.  -CLL: surveillance    RTC 3 month    Chief Complaint   Patient presents with    Coronary Artery Disease     Consult      HPI:  Cam Wilkins is a 73 y.o. male who presents to the cardiology clinic to establish care. [facility] but would like to switch all his care to [facility]. Per patient he has a history of CAD with ACS (see A/P), and pericarditis with pericardial effusion s/p drainage in . At that time he was noted to have atrial fibrilaltion and was started on eliquis and metoprolol. He had stress echo done in 10/2022 for dyspnea on exertion, which was abnormal with EKG changes positive for ischemia, SVT in recovery and LV dilation in post stress. He was recommended to have LHC but he declined as was concerned for risk associated with procedure. He got second opinion in [place] with his previous cardiologist, who managed him during initial LHC, who also offered LHC but patient declined as this is far from his current living place, [place]. He thinks that dyspnea on exertion is possibly due to deconditioning and he works with PT now. He admits to  occasional palpitations similar to the ones for which he had extended heart monitor in 3/2023, which showed frequent SVTs but overall reports decrease in frequency.     Sox[facility]: never smoker, occasional alcohol intake  Fhx: mother  in her 70s from heart failure, had afib    Review of Systems admits to dyspnea on exertion, fatigue, palpitation. Denies chest pain. Uses compressions stockings for leg edema, denies syncope.    Past  history, family history, social history, current medications, vital signs, recent lab and imaging studies and  prior cardiology studies reviewed independently on this visit.    Allergies   Allergen Reactions    Celecoxib Palpitations    Clarithromycin     Rofecoxib Palpitations and Other (See Comments)     Heart palpations.     Other reaction(s): Erythema    All salmon inhibitors.     Current Outpatient Medications:     amLODIPine (NORVASC) 2.5 mg tablet, amlodipine 2.5 mg tablet  TAKE ONE TABLET BY MOUTH EVERY DAY, Disp: , Rfl:     atorvastatin (LIPITOR) 40 mg tablet, Take 40 mg by mouth daily, Disp: , Rfl:     Cholecalciferol (Vitamin D3) 50 MCG (2000 UT) capsule, Take 2,000 Units by mouth daily, Disp: , Rfl:     dulaglutide (Trulicity) 3 MG/0.5ML injection, Inject 0.5 mL (3 mg total) under the skin every 7 days, Disp: 6 mL, Rfl: 1    Eliquis 5 MG, Take 5 mg by mouth 2 (two) times a day, Disp: , Rfl:     Fluticasone-Salmeterol (Advair Diskus) 250-50 mcg/dose inhaler, Inhale 1 puff 2 (two) times a day, Disp: 60 blister, Rfl: 2    furosemide (LASIX) 20 mg tablet, Take 20 mg by mouth daily, Disp: , Rfl:     ipratropium-albuterol (COMBIVENT RESPIMAT) inhaler, as needed, Disp: , Rfl:     metFORMIN (GLUCOPHAGE) 500 mg tablet, Take 2 in AM one In PM, Disp: , Rfl:     metoprolol succinate (TOPROL-XL) 50 mg 24 hr tablet, Take 50 mg by mouth 2 (two) times a day, Disp: , Rfl:     multivitamin-minerals (CENTRUM) tablet, Take 1 tablet by mouth daily, Disp: , Rfl:     Na Sulfate-K Sulfate-Mg Sulf 17.5-3.13-1.6 GM/177ML SOLN, Colonoscopy in March, Disp: , Rfl:     olmesartan (BENICAR) 40 mg tablet, olmesartan 40 mg tablet, Disp: , Rfl:     pantoprazole (PROTONIX) 40 mg tablet, Take 40 mg by mouth daily, Disp: , Rfl:     vitamin B-12 (CYANOCOBALAMIN) 50 MCG TABS, Place 1 tablet under the tongue daily, Disp: , Rfl:     Zinc 10 MG LOZG, Take by mouth, Disp: , Rfl:     Objective:   Vitals:    01/15/24 1016   BP: 126/66   Pulse: 78  "    Wt Readings from Last 3 Encounters:   01/15/24 119 kg (262 lb)   11/30/23 117 kg (259 lb)   11/03/23 119 kg (263 lb)     Pulse Readings from Last 3 Encounters:   01/15/24 78   11/30/23 77   10/28/23 75     BP Readings from Last 3 Encounters:   01/15/24 126/66   12/04/23 160/90   11/30/23 130/70     Physical Exam  General Appearance:    Alert, cooperative, no distress, appears stated age   Head:    atraumatic   Neck:   Supple,  no carotid  bruit or JVD   Lungs:     Clear to auscultation bilaterally, respirations unlabored   Chest wall:    No tenderness or deformity   Heart:    Regular rate and rhythm, S1 and S2 normal, no murmur, rub    or gallop   Abdomen:     Soft, non-tender, no organomegaly   Extremities:   Extremities no cyanosis, trace b/l edema   Pulses:   2+ and symmetric all extremities   Skin:   Skin color, texture, turgor normal, no rashes or lesions      Pertinent Laboratory/Diagnostic Studies:    Laboratory studies reviewed personally by Shelby Matson DO    BMP:   Lab Results   Component Value Date    SODIUM 144 06/14/2019    K 3.3 (L) 06/14/2019     06/14/2019    CO2 25 06/14/2019    BUN 18 06/14/2019    CREATININE 1.20 06/14/2019    GLUC 133 06/14/2019    CALCIUM 9.1 06/14/2019     CBC:  Lab Results   Component Value Date    WBC 12.51 (H) 06/16/2019    HGB 13.9 06/16/2019    HCT 42.0 06/16/2019    MCV 91 06/16/2019     06/16/2019     Lipid Profile:   Lab Results   Component Value Date    HDL 37 (L) 11/30/2023     Lab Results   Component Value Date    LDLCALC 55 11/30/2023     Lab Results   Component Value Date    TRIG 180 (H) 11/30/2023      Other labs:  No results found for: \"[id number]\", \"TSH\"  Lab Results   Component Value Date    ALT 30 06/12/2019    AST 19 06/12/2019     Imaging Studies:  Pertinent imaging studies and cardiac studies were independently reviewed on this visit and findings summarized.    Shelby Matson DO    Portions of the record may have been " "created with voice recognition software.  Occasional wrong word or \"sound alike\" substitutions may have occurred due to the inherent limitations of voice recognition software.  Read the chart carefully and recognize, using context, where substitutions have occurred. Please reach out to me directly for any clarifications.   "

## 2024-01-16 ENCOUNTER — TELEPHONE (OUTPATIENT)
Dept: FAMILY MEDICINE CLINIC | Facility: CLINIC | Age: 74
End: 2024-01-16

## 2024-01-16 ENCOUNTER — PATIENT MESSAGE (OUTPATIENT)
Dept: FAMILY MEDICINE CLINIC | Facility: CLINIC | Age: 74
End: 2024-01-16

## 2024-01-16 ENCOUNTER — TELEPHONE (OUTPATIENT)
Age: 74
End: 2024-01-16

## 2024-01-16 DIAGNOSIS — E11.9 TYPE 2 DIABETES MELLITUS WITHOUT COMPLICATION, WITHOUT LONG-TERM CURRENT USE OF INSULIN (HCC): Primary | ICD-10-CM

## 2024-01-16 NOTE — TELEPHONE ENCOUNTER
Patient wife called stating that there is no trulicity on the market nor ozempic. Patient would like advise on what he can do to control his sugar levels. Wife did state you can call  back with response.

## 2024-01-16 NOTE — TELEPHONE ENCOUNTER
Patients wife is sending over through [employer] a list of his blood sugars that were documented in the past month and would like this to be reviewed prior to phone call to patient.

## 2024-01-17 ENCOUNTER — OFFICE VISIT (OUTPATIENT)
Dept: PHYSICAL THERAPY | Facility: CLINIC | Age: 74
End: 2024-01-17
Payer: MEDICARE

## 2024-01-17 DIAGNOSIS — M54.50 ACUTE RIGHT-SIDED LOW BACK PAIN WITHOUT SCIATICA: Primary | ICD-10-CM

## 2024-01-17 PROCEDURE — 97110 THERAPEUTIC EXERCISES: CPT | Performed by: PHYSICAL THERAPIST

## 2024-01-17 PROCEDURE — 97530 THERAPEUTIC ACTIVITIES: CPT | Performed by: PHYSICAL THERAPIST

## 2024-01-17 PROCEDURE — 97112 NEUROMUSCULAR REEDUCATION: CPT | Performed by: PHYSICAL THERAPIST

## 2024-01-17 NOTE — PROGRESS NOTES
"Daily Note     Today's date: 2024  Patient name: Cam Wilkins  : 1950  MRN: [id number]  Referring provider: Rosalie Pina MD  Dx:   Encounter Diagnosis     ICD-10-CM    1. Acute right-sided low back pain without sciatica  M54.50                      Subjective: Pt reports feeling stiff today.      Objective: See treatment diary below      Assessment: Pt is challenged w progression of today's session and has lots of difficulty with squat to rows and trial of step ups with march.  He continues to have some difficulty with core exercises including curl ups and dead bugs.  Patient demonstrated fatigue post treatment and would benefit from continued PT.      Plan: Continue per plan of care.  Progress treatment as tolerated.       Precautions: Mod risk    Date    Visit # 11   4 5 6 7 8 9 10   FOTO      xx        Re-eval               Manuals    Monitor BP             Lumbar Gapping SF    SF SF SF SF SF SF          PROM  HY                   Neuro Re-Ed       Clams 2x15 purple   2x15 GTB (progress to blue NV) 2x10 purple 2x10 purple 2x10 purple 2x15 purple 2x15 purple 2x15 purple   Dead Bugs 2x10 2#   2x15 2x20        SB TA press             TA bracing    10x10\" w add 10x10\" w add        Bridges 2x10   15x5\" 20x5\" 20x purple 20x purple 20x purple 20x purple 2x15 purple   Curl ups 15x5: BMB    10x3\" YMB 2x10 YMB 2x10 YMB 2x10 BMB 2x10 BMB                 Ther Ex       Bike 8'   6' 6' 8' 8' 8' 8' 8'   Pulleys 3'    3' 3' @ home today 3' @ home @ home   Open book    3x15  \" 3x15\" 3x15\" 3x15\" 3x15\" 3x15\" 3x15\"   Piriformis str    3x15\" 3x15\" 3x15\" 3x15\"      LTR             Hamstring str     3x15\"        SB rolling             Standing hip ABD & Ext    20x BTB 2x15 purple 2x15 purple 2x15 purple 2x15 purple 2x15 purple    SLR 2x15 2#   20x 2#  2x15 2x15 2x15 " "2x15 2x15 2#   S/l hip ABD 2x15 2#         2x15 2#   HF str 3x15\"   3x15\" 3x15\" 3x15\" 3x15\" 3x15\" 3x15\" 3x15\"   Leg press 3x15 155#   3x10 65# 3x10 95# 3x10 115# nv 3x10 135# 3x10 135# 3x15 145#                Ther Activity       1/2 1/4 1/9 1/11   Squat 2x10 35#   10x5\" 2x10 2x10 2x10 3x10 30# squat to row 3x10 30# squat to row 3x15 35# squat to row   UTR 3x10 8#    20x 6# 2x15 7# 2x15 7# 20x 7# 20x 7# 3x15 8#   Step ups w march 20x 2R w 15# KB    **                     Gait Training                                       Modalities                                                            "

## 2024-01-19 ENCOUNTER — OFFICE VISIT (OUTPATIENT)
Dept: PHYSICAL THERAPY | Facility: CLINIC | Age: 74
End: 2024-01-19
Payer: MEDICARE

## 2024-01-19 DIAGNOSIS — M54.50 ACUTE RIGHT-SIDED LOW BACK PAIN WITHOUT SCIATICA: Primary | ICD-10-CM

## 2024-01-19 PROCEDURE — 97112 NEUROMUSCULAR REEDUCATION: CPT

## 2024-01-19 PROCEDURE — 97530 THERAPEUTIC ACTIVITIES: CPT

## 2024-01-19 PROCEDURE — 97110 THERAPEUTIC EXERCISES: CPT

## 2024-01-19 NOTE — PROGRESS NOTES
"Daily Note     Today's date: 2024  Patient name: Cam Wilkins  : 1950  MRN: [id number]  Referring provider: Rosalie Pina MD  Dx:   Encounter Diagnosis     ICD-10-CM    1. Acute right-sided low back pain without sciatica  M54.50                      Subjective: Pt reports reduction in LBP and improved BLE strength since the start of physical therapy.     He will be driving down to [place] today, staying for 3-4 wks. He plan on making more PT appts when he comes back.     Objective: See treatment diary below      Assessment: Tolerated treatment well. Overall, pt demonstrated good endurance, little rest breaks required to complete session. Good core engagement with spine tasks, fair coordination with breathing- requiring frequent cuing. Hip strengthening tasks are challenging, with compensatory patters. He continues to respond well to lumbar gapping. Continued PT would be beneficial to improve function.          Plan: Continue per plan of care.       Precautions: Mod risk    Date    Visit # 11 12  4 5 6 7 8 9 10   FOTO      xx        Re-eval               Manuals    Monitor BP             Lumbar Gapping SF PK   SF SF SF SF SF SF          PROM  HY                   Neuro Re-Ed       Clams 2x15 purple 2x15 purple  2x15 GTB (progress to blue NV) 2x10 purple 2x10 purple 2x10 purple 2x15 purple 2x15 purple 2x15 purple   Dead Bugs 2x10 2# 2x10  2x15 2x20        SB TA press             TA bracing    10x10\" w add 10x10\" w add        Bridges 2x10 2x10  15x5\" 20x5\" 20x purple 20x purple 20x purple 20x purple 2x15 purple   Curl ups 15x5: BMB 15x5: BMB   10x3\" YMB 2x10 YMB 2x10 YMB 2x10 BMB 2x10 BMB                 Ther Ex       Bike 8' 8'  6' 6' 8' 8' 8' 8' 8'   Pulleys 3' @ home   3' 3' @ home today 3' @ home @ home   Open book  3x15\"  3x15  \" 3x15\" " "3x15\" 3x15\" 3x15\" 3x15\" 3x15\"   Piriformis str    3x15\" 3x15\" 3x15\" 3x15\"      LTR             Hamstring str     3x15\"        SB rolling             Standing hip ABD & Ext    20x BTB 2x15 purple 2x15 purple 2x15 purple 2x15 purple 2x15 purple    SLR 2x15 2# 2x15 2#  20x 2#  2x15 2x15 2x15 2x15 2x15 2#   S/l hip ABD 2x15 2# 2x15 2#        2x15 2#   HF str 3x15\" 3x15\" with strap  3x15\" 3x15\" 3x15\" 3x15\" 3x15\" 3x15\" 3x15\"   Leg press 3x15 155# 3x15 155#  3x10 65# 3x10 95# 3x10 115# nv 3x10 135# 3x10 135# 3x15 145#                Ther Activity       1/2 1/4 1/9 1/11   Squat 2x10 35# Held today k p!  10x5\" 2x10 2x10 2x10 3x10 30# squat to row 3x10 30# squat to row 3x15 35# squat to row   UTR 3x10 8# 3x1010#   20x 6# 2x15 7# 2x15 7# 20x 7# 20x 7# 3x15 8#   Step ups w march 20x 2R w 15# KB 20x R w 15# KB,15x L 1R    **                     Gait Training                                       Modalities                                                              "

## 2024-01-22 ENCOUNTER — APPOINTMENT (OUTPATIENT)
Dept: PHYSICAL THERAPY | Facility: CLINIC | Age: 74
End: 2024-01-22
Payer: MEDICARE

## 2024-01-24 ENCOUNTER — APPOINTMENT (OUTPATIENT)
Dept: PHYSICAL THERAPY | Facility: CLINIC | Age: 74
End: 2024-01-24
Payer: MEDICARE

## 2024-02-16 ENCOUNTER — TELEPHONE (OUTPATIENT)
Dept: CARDIOLOGY CLINIC | Facility: CLINIC | Age: 74
End: 2024-02-16

## 2024-02-16 NOTE — TELEPHONE ENCOUNTER
PC from wife stating Dr Ryan wanted him not to take Trulicity before cath on 2/27/24.  He was now changed to Mounjaro injection once weekly and wants to know directions regarding this new medication before cath.  Are the directions the same as to not to take it or will there be different directions.

## 2024-02-22 NOTE — TELEPHONE ENCOUNTER
Patients wife called, asking if he still needs to hold Mounjaro 1 week before cath. Advised Dr Ryan's recommendations.

## 2024-02-25 ENCOUNTER — APPOINTMENT (OUTPATIENT)
Dept: LAB | Facility: HOSPITAL | Age: 74
End: 2024-02-25
Payer: MEDICARE

## 2024-02-25 DIAGNOSIS — I25.10 CORONARY ARTERY DISEASE INVOLVING NATIVE CORONARY ARTERY WITHOUT ANGINA PECTORIS, UNSPECIFIED WHETHER NATIVE OR TRANSPLANTED HEART: ICD-10-CM

## 2024-02-25 DIAGNOSIS — I25.10 CORONARY ARTERY DISEASE INVOLVING NATIVE HEART, UNSPECIFIED VESSEL OR LESION TYPE, UNSPECIFIED WHETHER ANGINA PRESENT: ICD-10-CM

## 2024-02-25 LAB
ANION GAP SERPL CALCULATED.3IONS-SCNC: 8 MMOL/L
BASOPHILS # BLD MANUAL: 0 THOUSAND/UL (ref 0–0.1)
BASOPHILS NFR MAR MANUAL: 0 % (ref 0–1)
BUN SERPL-MCNC: 21 MG/DL (ref 5–25)
CALCIUM SERPL-MCNC: 9.3 MG/DL (ref 8.4–10.2)
CHLORIDE SERPL-SCNC: 104 MMOL/L (ref 96–108)
CO2 SERPL-SCNC: 27 MMOL/L (ref 21–32)
CREAT SERPL-MCNC: 1.15 MG/DL (ref 0.6–1.3)
EOSINOPHIL # BLD MANUAL: 0 THOUSAND/UL (ref 0–0.4)
EOSINOPHIL NFR BLD MANUAL: 0 % (ref 0–6)
ERYTHROCYTE [DISTWIDTH] IN BLOOD BY AUTOMATED COUNT: 13.5 % (ref 11.6–15.1)
GFR SERPL CREATININE-BSD FRML MDRD: 62 ML/MIN/1.73SQ M
GLUCOSE P FAST SERPL-MCNC: 174 MG/DL (ref 65–99)
HCT VFR BLD AUTO: 42.3 % (ref 36.5–49.3)
HGB BLD-MCNC: 13.9 G/DL (ref 12–17)
LYMPHOCYTES # BLD AUTO: 20.69 THOUSAND/UL (ref 0.6–4.47)
LYMPHOCYTES # BLD AUTO: 66 % (ref 14–44)
MCH RBC QN AUTO: 29.6 PG (ref 26.8–34.3)
MCHC RBC AUTO-ENTMCNC: 32.9 G/DL (ref 31.4–37.4)
MCV RBC AUTO: 90 FL (ref 82–98)
MONOCYTES # BLD AUTO: 0.63 THOUSAND/UL (ref 0–1.22)
MONOCYTES NFR BLD: 2 % (ref 4–12)
NEUTROPHILS # BLD MANUAL: 10.03 THOUSAND/UL (ref 1.85–7.62)
NEUTS SEG NFR BLD AUTO: 32 % (ref 43–75)
PLATELET # BLD AUTO: 272 THOUSANDS/UL (ref 149–390)
PLATELET BLD QL SMEAR: ADEQUATE
PMV BLD AUTO: 10.5 FL (ref 8.9–12.7)
POTASSIUM SERPL-SCNC: 4.2 MMOL/L (ref 3.5–5.3)
RBC # BLD AUTO: 4.69 MILLION/UL (ref 3.88–5.62)
RBC MORPH BLD: NORMAL
SODIUM SERPL-SCNC: 139 MMOL/L (ref 135–147)
WBC # BLD AUTO: 31.35 THOUSAND/UL (ref 4.31–10.16)

## 2024-02-25 PROCEDURE — 80048 BASIC METABOLIC PNL TOTAL CA: CPT

## 2024-02-25 PROCEDURE — 36415 COLL VENOUS BLD VENIPUNCTURE: CPT

## 2024-02-25 PROCEDURE — 85007 BL SMEAR W/DIFF WBC COUNT: CPT

## 2024-02-25 PROCEDURE — 85027 COMPLETE CBC AUTOMATED: CPT

## 2024-02-26 ENCOUNTER — TELEPHONE (OUTPATIENT)
Dept: CARDIOLOGY CLINIC | Facility: CLINIC | Age: 74
End: 2024-02-26

## 2024-02-26 NOTE — TELEPHONE ENCOUNTER
CBC showed elevated WBC 31.35 K. Mr. Wilkins follows with [facility] for CLL with frequent CBCs with trend as below, with surveillance and no active treatment.

## 2024-02-27 ENCOUNTER — HOSPITAL ENCOUNTER (OUTPATIENT)
Facility: HOSPITAL | Age: 74
Setting detail: OUTPATIENT SURGERY
Discharge: HOME | End: 2024-02-28
Attending: INTERNAL MEDICINE | Admitting: INTERNAL MEDICINE
Payer: MEDICARE

## 2024-02-27 DIAGNOSIS — Z95.5 STATUS POST INSERTION OF DRUG ELUTING CORONARY ARTERY STENT: Primary | ICD-10-CM

## 2024-02-27 DIAGNOSIS — I25.10 CORONARY ARTERY DISEASE INVOLVING NATIVE HEART, UNSPECIFIED VESSEL OR LESION TYPE, UNSPECIFIED WHETHER ANGINA PRESENT: ICD-10-CM

## 2024-02-27 DIAGNOSIS — E11.9 TYPE 2 DIABETES MELLITUS WITHOUT COMPLICATION, WITHOUT LONG-TERM CURRENT USE OF INSULIN (HCC): Primary | ICD-10-CM

## 2024-02-27 LAB
ANION GAP SERPL CALCULATED.3IONS-SCNC: 8 MMOL/L
ATRIAL RATE: 62 BPM
ATRIAL RATE: 63 BPM
BUN SERPL-MCNC: 20 MG/DL (ref 5–25)
CALCIUM SERPL-MCNC: 8.7 MG/DL (ref 8.4–10.2)
CHLORIDE SERPL-SCNC: 106 MMOL/L (ref 96–108)
CO2 SERPL-SCNC: 24 MMOL/L (ref 21–32)
CREAT SERPL-MCNC: 1.13 MG/DL (ref 0.6–1.3)
ERYTHROCYTE [DISTWIDTH] IN BLOOD BY AUTOMATED COUNT: 13.5 % (ref 11.6–15.1)
EST. AVERAGE GLUCOSE BLD GHB EST-MCNC: 151 MG/DL
GFR SERPL CREATININE-BSD FRML MDRD: 63 ML/MIN/1.73SQ M
GLUCOSE P FAST SERPL-MCNC: 170 MG/DL (ref 65–99)
GLUCOSE SERPL-MCNC: 136 MG/DL (ref 65–140)
GLUCOSE SERPL-MCNC: 150 MG/DL (ref 65–140)
GLUCOSE SERPL-MCNC: 160 MG/DL (ref 65–140)
GLUCOSE SERPL-MCNC: 170 MG/DL (ref 65–140)
GLUCOSE SERPL-MCNC: 176 MG/DL (ref 65–140)
HBA1C MFR BLD: 6.9 %
HCT VFR BLD AUTO: 40.6 % (ref 36.5–49.3)
HGB BLD-MCNC: 13.2 G/DL (ref 12–17)
KCT BLD-ACNC: 252 SEC (ref 89–137)
KCT BLD-ACNC: 270 SEC (ref 89–137)
KCT BLD-ACNC: 270 SEC (ref 89–137)
KCT BLD-ACNC: 277 SEC (ref 89–137)
MCH RBC QN AUTO: 29.8 PG (ref 26.8–34.3)
MCHC RBC AUTO-ENTMCNC: 32.5 G/DL (ref 31.4–37.4)
MCV RBC AUTO: 92 FL (ref 82–98)
P AXIS: 48 DEGREES
P AXIS: 56 DEGREES
PLATELET # BLD AUTO: 254 THOUSANDS/UL (ref 149–390)
PMV BLD AUTO: 11 FL (ref 8.9–12.7)
POTASSIUM SERPL-SCNC: 4.3 MMOL/L (ref 3.5–5.3)
PR INTERVAL: 152 MS
PR INTERVAL: 160 MS
QRS AXIS: 14 DEGREES
QRS AXIS: 18 DEGREES
QRSD INTERVAL: 90 MS
QRSD INTERVAL: 94 MS
QT INTERVAL: 464 MS
QT INTERVAL: 472 MS
QTC INTERVAL: 470 MS
QTC INTERVAL: 483 MS
RBC # BLD AUTO: 4.43 MILLION/UL (ref 3.88–5.62)
S PYO DNA THROAT QL NAA+PROBE: NOT DETECTED
SODIUM SERPL-SCNC: 138 MMOL/L (ref 135–147)
SPECIMEN SOURCE: ABNORMAL
T WAVE AXIS: 16 DEGREES
T WAVE AXIS: 30 DEGREES
VENTRICULAR RATE: 62 BPM
VENTRICULAR RATE: 63 BPM
WBC # BLD AUTO: 24.47 THOUSAND/UL (ref 4.31–10.16)

## 2024-02-27 PROCEDURE — 99152 MOD SED SAME PHYS/QHP 5/>YRS: CPT | Performed by: INTERNAL MEDICINE

## 2024-02-27 PROCEDURE — 85347 COAGULATION TIME ACTIVATED: CPT

## 2024-02-27 PROCEDURE — C1894 INTRO/SHEATH, NON-LASER: HCPCS | Performed by: INTERNAL MEDICINE

## 2024-02-27 PROCEDURE — 82948 REAGENT STRIP/BLOOD GLUCOSE: CPT

## 2024-02-27 PROCEDURE — 93458 L HRT ARTERY/VENTRICLE ANGIO: CPT | Performed by: INTERNAL MEDICINE

## 2024-02-27 PROCEDURE — C1887 CATHETER, GUIDING: HCPCS | Performed by: INTERNAL MEDICINE

## 2024-02-27 PROCEDURE — 93010 ELECTROCARDIOGRAM REPORT: CPT | Performed by: INTERNAL MEDICINE

## 2024-02-27 PROCEDURE — NC001 PR NO CHARGE

## 2024-02-27 PROCEDURE — 83036 HEMOGLOBIN GLYCOSYLATED A1C: CPT

## 2024-02-27 PROCEDURE — C1725 CATH, TRANSLUMIN NON-LASER: HCPCS | Performed by: INTERNAL MEDICINE

## 2024-02-27 PROCEDURE — C1769 GUIDE WIRE: HCPCS | Performed by: INTERNAL MEDICINE

## 2024-02-27 PROCEDURE — 99153 MOD SED SAME PHYS/QHP EA: CPT | Performed by: INTERNAL MEDICINE

## 2024-02-27 PROCEDURE — C1753 CATH, INTRAVAS ULTRASOUND: HCPCS | Performed by: INTERNAL MEDICINE

## 2024-02-27 PROCEDURE — 92978 ENDOLUMINL IVUS OCT C 1ST: CPT | Performed by: INTERNAL MEDICINE

## 2024-02-27 PROCEDURE — 92972 PERQ TRLUML CORONRY LITHOTRP: CPT | Performed by: INTERNAL MEDICINE

## 2024-02-27 PROCEDURE — 85027 COMPLETE CBC AUTOMATED: CPT

## 2024-02-27 PROCEDURE — C1874 STENT, COATED/COV W/DEL SYS: HCPCS | Performed by: INTERNAL MEDICINE

## 2024-02-27 PROCEDURE — C1761 CATH SHOCKWAVE C2 PLUS 3 X 12MM: HCPCS | Performed by: INTERNAL MEDICINE

## 2024-02-27 PROCEDURE — 80048 BASIC METABOLIC PNL TOTAL CA: CPT

## 2024-02-27 PROCEDURE — 92979 ENDOLUMINL IVUS OCT C EA: CPT | Performed by: INTERNAL MEDICINE

## 2024-02-27 PROCEDURE — C9600 PERC DRUG-EL COR STENT SING: HCPCS | Performed by: INTERNAL MEDICINE

## 2024-02-27 PROCEDURE — 93005 ELECTROCARDIOGRAM TRACING: CPT

## 2024-02-27 PROCEDURE — 92928 PRQ TCAT PLMT NTRAC ST 1 LES: CPT | Performed by: INTERNAL MEDICINE

## 2024-02-27 DEVICE — IMPLANTABLE DEVICE
Type: IMPLANTABLE DEVICE | Site: CORONARY | Status: FUNCTIONAL
Brand: ORSIRO MISSION

## 2024-02-27 RX ORDER — HEPARIN SODIUM 1000 [USP'U]/ML
INJECTION, SOLUTION INTRAVENOUS; SUBCUTANEOUS CODE/TRAUMA/SEDATION MEDICATION
Status: DISCONTINUED | OUTPATIENT
Start: 2024-02-27 | End: 2024-02-27 | Stop reason: HOSPADM

## 2024-02-27 RX ORDER — ACETAMINOPHEN 325 MG/1
650 TABLET ORAL EVERY 4 HOURS PRN
Status: DISCONTINUED | OUTPATIENT
Start: 2024-02-27 | End: 2024-02-28 | Stop reason: HOSPADM

## 2024-02-27 RX ORDER — ONDANSETRON 2 MG/ML
4 INJECTION INTRAMUSCULAR; INTRAVENOUS EVERY 6 HOURS PRN
Status: DISCONTINUED | OUTPATIENT
Start: 2024-02-27 | End: 2024-02-28 | Stop reason: HOSPADM

## 2024-02-27 RX ORDER — ATORVASTATIN CALCIUM 80 MG/1
80 TABLET, FILM COATED ORAL DAILY
Status: DISCONTINUED | OUTPATIENT
Start: 2024-02-28 | End: 2024-02-28 | Stop reason: HOSPADM

## 2024-02-27 RX ORDER — LIDOCAINE HYDROCHLORIDE 10 MG/ML
INJECTION, SOLUTION EPIDURAL; INFILTRATION; INTRACAUDAL; PERINEURAL CODE/TRAUMA/SEDATION MEDICATION
Status: DISCONTINUED | OUTPATIENT
Start: 2024-02-27 | End: 2024-02-27 | Stop reason: HOSPADM

## 2024-02-27 RX ORDER — NITROGLYCERIN 20 MG/100ML
5-200 INJECTION INTRAVENOUS
Status: DISCONTINUED | OUTPATIENT
Start: 2024-02-27 | End: 2024-02-28 | Stop reason: HOSPADM

## 2024-02-27 RX ORDER — INSULIN LISPRO 100 [IU]/ML
2-12 INJECTION, SOLUTION INTRAVENOUS; SUBCUTANEOUS
Status: DISCONTINUED | OUTPATIENT
Start: 2024-02-27 | End: 2024-02-28 | Stop reason: HOSPADM

## 2024-02-27 RX ORDER — FUROSEMIDE 10 MG/ML
40 INJECTION INTRAMUSCULAR; INTRAVENOUS ONCE
Status: COMPLETED | OUTPATIENT
Start: 2024-02-27 | End: 2024-02-27

## 2024-02-27 RX ORDER — LOSARTAN POTASSIUM 50 MG/1
100 TABLET ORAL DAILY
Status: DISCONTINUED | OUTPATIENT
Start: 2024-02-28 | End: 2024-02-28 | Stop reason: HOSPADM

## 2024-02-27 RX ORDER — FUROSEMIDE 20 MG/1
20 TABLET ORAL DAILY
Status: DISCONTINUED | OUTPATIENT
Start: 2024-02-28 | End: 2024-02-28 | Stop reason: HOSPADM

## 2024-02-27 RX ORDER — VERAPAMIL HCL 2.5 MG/ML
AMPUL (ML) INTRAVENOUS CODE/TRAUMA/SEDATION MEDICATION
Status: DISCONTINUED | OUTPATIENT
Start: 2024-02-27 | End: 2024-02-27 | Stop reason: HOSPADM

## 2024-02-27 RX ORDER — SODIUM CHLORIDE 9 MG/ML
100 INJECTION, SOLUTION INTRAVENOUS CONTINUOUS
Status: DISPENSED | OUTPATIENT
Start: 2024-02-27 | End: 2024-02-27

## 2024-02-27 RX ORDER — ASPIRIN 81 MG/1
324 TABLET, CHEWABLE ORAL DAILY
Status: DISCONTINUED | OUTPATIENT
Start: 2024-02-28 | End: 2024-02-28

## 2024-02-27 RX ORDER — METOPROLOL SUCCINATE 50 MG/1
50 TABLET, EXTENDED RELEASE ORAL 2 TIMES DAILY
Status: DISCONTINUED | OUTPATIENT
Start: 2024-02-27 | End: 2024-02-28 | Stop reason: HOSPADM

## 2024-02-27 RX ORDER — MIDAZOLAM HYDROCHLORIDE 2 MG/2ML
INJECTION, SOLUTION INTRAMUSCULAR; INTRAVENOUS CODE/TRAUMA/SEDATION MEDICATION
Status: DISCONTINUED | OUTPATIENT
Start: 2024-02-27 | End: 2024-02-27 | Stop reason: HOSPADM

## 2024-02-27 RX ORDER — FENTANYL CITRATE 50 UG/ML
INJECTION, SOLUTION INTRAMUSCULAR; INTRAVENOUS CODE/TRAUMA/SEDATION MEDICATION
Status: DISCONTINUED | OUTPATIENT
Start: 2024-02-27 | End: 2024-02-27 | Stop reason: HOSPADM

## 2024-02-27 RX ORDER — NITROGLYCERIN 20 MG/100ML
INJECTION INTRAVENOUS CODE/TRAUMA/SEDATION MEDICATION
Status: DISCONTINUED | OUTPATIENT
Start: 2024-02-27 | End: 2024-02-27 | Stop reason: HOSPADM

## 2024-02-27 RX ORDER — ATORVASTATIN CALCIUM 40 MG/1
40 TABLET, FILM COATED ORAL DAILY
Status: DISCONTINUED | OUTPATIENT
Start: 2024-02-27 | End: 2024-02-27

## 2024-02-27 RX ORDER — MELATONIN
2000 DAILY
Status: DISCONTINUED | OUTPATIENT
Start: 2024-02-28 | End: 2024-02-28 | Stop reason: HOSPADM

## 2024-02-27 RX ORDER — FLUTICASONE FUROATE AND VILANTEROL 200; 25 UG/1; UG/1
1 POWDER RESPIRATORY (INHALATION) 2 TIMES DAILY
Status: DISCONTINUED | OUTPATIENT
Start: 2024-02-27 | End: 2024-02-27

## 2024-02-27 RX ORDER — NITROGLYCERIN 20 MG/100ML
INJECTION INTRAVENOUS
Status: COMPLETED | OUTPATIENT
Start: 2024-02-27 | End: 2024-02-27

## 2024-02-27 RX ORDER — ASPIRIN 81 MG/1
324 TABLET, CHEWABLE ORAL ONCE
Status: DISCONTINUED | OUTPATIENT
Start: 2024-02-27 | End: 2024-02-27 | Stop reason: HOSPADM

## 2024-02-27 RX ORDER — CLOPIDOGREL BISULFATE 75 MG/1
75 TABLET ORAL DAILY
Status: DISCONTINUED | OUTPATIENT
Start: 2024-02-28 | End: 2024-02-28 | Stop reason: HOSPADM

## 2024-02-27 RX ORDER — AMLODIPINE BESYLATE 2.5 MG/1
2.5 TABLET ORAL DAILY
Status: DISCONTINUED | OUTPATIENT
Start: 2024-02-28 | End: 2024-02-28 | Stop reason: HOSPADM

## 2024-02-27 RX ORDER — FLUTICASONE FUROATE AND VILANTEROL 200; 25 UG/1; UG/1
1 POWDER RESPIRATORY (INHALATION) DAILY
Status: DISCONTINUED | OUTPATIENT
Start: 2024-02-28 | End: 2024-02-28 | Stop reason: HOSPADM

## 2024-02-27 RX ORDER — ASPIRIN 81 MG/1
324 TABLET, CHEWABLE ORAL ONCE
Status: COMPLETED | OUTPATIENT
Start: 2024-02-27 | End: 2024-02-27

## 2024-02-27 RX ORDER — PANTOPRAZOLE SODIUM 40 MG/1
40 TABLET, DELAYED RELEASE ORAL DAILY
Status: DISCONTINUED | OUTPATIENT
Start: 2024-02-27 | End: 2024-02-28 | Stop reason: HOSPADM

## 2024-02-27 RX ORDER — SODIUM CHLORIDE 9 MG/ML
125 INJECTION, SOLUTION INTRAVENOUS CONTINUOUS
Status: DISCONTINUED | OUTPATIENT
Start: 2024-02-27 | End: 2024-02-27

## 2024-02-27 RX ORDER — SODIUM CHLORIDE 9 MG/ML
100 INJECTION, SOLUTION INTRAVENOUS CONTINUOUS
Status: DISCONTINUED | OUTPATIENT
Start: 2024-02-27 | End: 2024-02-27

## 2024-02-27 RX ORDER — ALBUTEROL SULFATE 90 UG/1
2 AEROSOL, METERED RESPIRATORY (INHALATION) 4 TIMES DAILY
Status: DISCONTINUED | OUTPATIENT
Start: 2024-02-27 | End: 2024-02-28 | Stop reason: HOSPADM

## 2024-02-27 RX ORDER — CLOPIDOGREL BISULFATE 75 MG/1
600 TABLET ORAL ONCE
Status: COMPLETED | OUTPATIENT
Start: 2024-02-27 | End: 2024-02-27

## 2024-02-27 RX ADMIN — APIXABAN 5 MG: 5 TABLET, FILM COATED ORAL at 17:04

## 2024-02-27 RX ADMIN — INSULIN LISPRO 2 UNITS: 100 INJECTION, SOLUTION INTRAVENOUS; SUBCUTANEOUS at 17:34

## 2024-02-27 RX ADMIN — ALBUTEROL SULFATE 2 PUFF: 90 AEROSOL, METERED RESPIRATORY (INHALATION) at 21:59

## 2024-02-27 RX ADMIN — PANTOPRAZOLE SODIUM 40 MG: 40 TABLET, DELAYED RELEASE ORAL at 15:44

## 2024-02-27 RX ADMIN — NITROGLYCERIN 125 MCG/MIN: 20 INJECTION INTRAVENOUS at 21:20

## 2024-02-27 RX ADMIN — FUROSEMIDE 40 MG: 10 INJECTION, SOLUTION INTRAMUSCULAR; INTRAVENOUS at 13:31

## 2024-02-27 RX ADMIN — METOPROLOL SUCCINATE 50 MG: 50 TABLET, EXTENDED RELEASE ORAL at 17:04

## 2024-02-27 RX ADMIN — SODIUM CHLORIDE 125 ML/HR: 0.9 INJECTION, SOLUTION INTRAVENOUS at 08:09

## 2024-02-27 RX ADMIN — ASPIRIN 81 MG CHEWABLE TABLET 324 MG: 81 TABLET CHEWABLE at 08:02

## 2024-02-27 RX ADMIN — NITROGLYCERIN 150 MCG/MIN: 20 INJECTION INTRAVENOUS at 11:35

## 2024-02-27 RX ADMIN — Medication 1 TABLET: at 15:44

## 2024-02-27 RX ADMIN — Medication 50 MCG: at 15:44

## 2024-02-27 RX ADMIN — INSULIN LISPRO 2 UNITS: 100 INJECTION, SOLUTION INTRAVENOUS; SUBCUTANEOUS at 13:36

## 2024-02-27 RX ADMIN — CLOPIDOGREL BISULFATE 600 MG: 75 TABLET ORAL at 08:02

## 2024-02-27 RX ADMIN — SODIUM CHLORIDE 500 ML: 0.9 INJECTION, SOLUTION INTRAVENOUS at 13:13

## 2024-02-27 RX ADMIN — NITROGLYCERIN 150 MCG/MIN: 20 INJECTION INTRAVENOUS at 14:50

## 2024-02-27 NOTE — DISCHARGE INSTR - AVS FIRST PAGE
1. Please see the post angioplasty discharge instructions.   No heavy lifting, greater than 10 lbs. or strenuous activity for 5 days.  Follow angioplasty discharge instructions.    2.Remove band aid tomorrow.  Shower and wash area- wrist gently with soap and water- beginning tomorrow. Rinse and pat dry.  Apply new water seal band aid.  Repeat this process for 5 days. No powders, creams lotions or antibiotic ointments  for 5 days.  No tub baths, hot tubs or swimming for 5 days.     3. Call [facility] Cardiology Office ([phone]) if you develop a fever, redness or drainage at your wrist access site.      4. No driving for 2 days    5. Aspirin 81 mg daily, Eliquis 5 mg twice daily and Plavix 75 mg daily for 1 week and then proceed with Plavix 75 mg daily and Eliquis 5 mg twice daily.   Do not stop the medication without approval from Cardiology.     6. Stent card and book.

## 2024-02-27 NOTE — DISCHARGE SUMMARY
"Discharge Summary - Cam Wilkins 73 y.o. male MRN: [id number]    Unit/Bed#: BE CATH LAB ROOM Encounter: [id number]    Admission Date: 2/27/2024   Discharge Date:  2/28/2024    Disposition: Home    Condition at Discharge: good     PCP: Tremayne Lewis MD      OP Cardiologist: Dr. Matson       Interventional cardiologist: Dr. Briggs    Admitting Diagnosis:  Coronary artery disease  -Status post stent to RCA (2013)    Secondary Diagnoses:   Hypertension  -Amlodipine 2.5 mg daily, Lasix 20 mg daily, olmesartan 40 mg daily  Paroxysmal atrial fibrillation  -Eliquis 5 mg twice daily, metoprolol 50 mg twice daily  SVT  Hyperlipidemia  -Cholesterol 128, triglycerides 180, HDL 37 and LDL 55  -Atorvastatin 40 mg daily  CLL  -White blood cell count 24.47 on admission  -Follows with [facility]  Pericardial effusion  -Status post pericardiocentesis  Pericarditis  Type 2 diabetes  -Hemoglobin A1c 6.9  -Home regimen: Metformin 1000 mg in the a.m. and 500 mg in the p.m. at Mounjaro 5 mg/0.5 ml weekly injection  Obesity  -BMI 33.17 kg/m2      Discharge Diagnosis: Two-vessel coronary artery disease, s/p successful IVUS guided IVL PCI/stent  placement of two DARRION stents to circumflex artery (Orsiro   3.0 X 15 and 4.0 X 22 mm, overlapping DARRION) and placement  of  Orsiro 3.5 X 15 mm DARRION to mid-RCA following IVL.                   /73 (BP Location: Right arm)   Pulse 61   Temp 98 °F (36.7 °C) (Temporal)   Resp 16   Ht 6' 0.5\" (1.842 m)   Wt 112 kg (248 lb)   SpO2 100%   BMI 33.17 kg/m²       Review of Systems   Constitutional: Negative.  Negative for chills and fever.   HENT: Negative.  Negative for congestion.    Eyes: Negative.    Respiratory: Negative.  Negative for chest tightness and shortness of breath.    Cardiovascular: Negative.  Negative for chest pain, palpitations and leg swelling.   Gastrointestinal: Negative.  Negative for abdominal pain.   Endocrine: Negative.    Genitourinary: Negative. "  Negative for difficulty urinating.   Musculoskeletal: Negative.    Skin: Negative.    Allergic/Immunologic: Negative.    Neurological: Negative.  Negative for dizziness, weakness and light-headedness.   Hematological: Negative.    Psychiatric/Behavioral: Negative.         Physical Exam  Constitutional:       General: He is not in acute distress.     Appearance: Normal appearance. He is not ill-appearing.   HENT:      Head: Normocephalic.      Nose: Nose normal.      Mouth/Throat:      Mouth: Mucous membranes are moist.   Eyes:      Conjunctiva/sclera: Conjunctivae normal.   Cardiovascular:      Rate and Rhythm: Normal rate and regular rhythm.      Pulses: Normal pulses.   Pulmonary:      Effort: Pulmonary effort is normal.   Abdominal:      General: Bowel sounds are normal.   Musculoskeletal:      Right lower leg: No edema.      Left lower leg: No edema.   Skin:     General: Skin is warm.      Capillary Refill: Capillary refill takes less than 2 seconds.   Neurological:      Mental Status: He is alert and oriented to person, place, and time.   Psychiatric:         Mood and Affect: Mood normal.         Behavior: Behavior normal.         Thought Content: Thought content normal.         Judgment: Judgment normal.             HPI and Hospital Course: Cam Baer, 73-year-old male, presented to [facility] for outpatient elective cardiac catheterization for evaluation of abnormal echo stress test and worsening fatigue.  He recently established care with Dr. Matson.       Echo stress test performed 10/21/2022 showed EKG positive for ischemia with ST depressions in inferior lateral leads.  He was subsequently referred for cardiac cath in 2022 but unfortunately did not pursue evaluation at that time.    Past medical history is significant for coronary artery disease (status post PCI/stent to RCA 2013: Xience 3.5 x 15 mm drug-eluting stent at [facility]), SVT, atrial fibrillation, CLL  (follows with [facility]), hyperlipidemia, hypertension, pericardial effusion status post pericardiocentesis, pericarditis, type 2 diabetes and obesity (BMI 33.17 kg/m2).      Prior to cardiac catheterization patient was loaded with aspirin 324 mg and Plavix 600 mg.  Cardiac catheterization was performed via the right radial artery. Findings:  Two vessel CAD with an 80% and 90% stenosis of circumflex artery and 90% stenosis of mid RCA just distal to previous RCA  stent.    Successful IVUS guided PCI of circumflex artery and placement of Orsiro 3.0 X 15 mm  and Orsiro 4.0 X 22 mm drug eluting stent (overlapping).   In addition, 90% stenosis of RCA with successful IVUS guided IVL PCI and placement of Orsiro 3.5 X 15 mm DARRION.  Post intervention both vessels had )% residual stenosis.    Nitroglycerin infusion was started during cardiac catheterization and maintained during post-operative period for hypertension, 180-190/90's.   Overall, he tolerated the procedure well.      Over the course of the night, Nitroglycerin gtt was titrated off.       No overnight events.  He remained hemodynamically stable throughout the admission.    Right radial site intact.  No hematoma. No bleeding.  No bruit/thrill.  Radial and ulnar pulses palpable.   Sensation intact.   Lengthy discussion with patient regarding discharge plan.         Discharge plan  Triple therapy with ASA 81 mg daily, Plavix 75 mg daily and Eliquis 5 mg twice daily for 1 week, then proceed with Eliquis 5 mg twice daily and Plavix 75 mg daily for 6 months.     Atorvastatin was increased to 80 mg daily during admission.  While LDL is reasonably well controlled in view of progression of CAD statin therapy is increased.  Cardiac rehab referral made.      Follow-up office visit is scheduled for 3/11/2024 in the [place] office.     Aggressive risk factor modification         Current Facility-Administered Medications   Medication Dose Route Frequency     acetaminophen (TYLENOL) tablet 650 mg  650 mg Oral Q4H PRN    umeclidinium 62.5 mcg/actuation inhaler AEPB 1 puff  1 puff Inhalation Daily    And    albuterol (PROVENTIL HFA,VENTOLIN HFA) inhaler 2 puff  2 puff Inhalation 4x Daily    [START ON 2/28/2024] amLODIPine (NORVASC) tablet 2.5 mg  2.5 mg Oral Daily    apixaban (ELIQUIS) tablet 5 mg  5 mg Oral BID    aspirin chewable tablet 324 mg  324 mg Oral Once    atorvastatin (LIPITOR) tablet 40 mg  40 mg Oral Daily    [START ON 2/28/2024] cholecalciferol (VITAMIN D3) tablet 2,000 Units  2,000 Units Oral Daily    [START ON 2/28/2024] clopidogrel (PLAVIX) tablet 75 mg  75 mg Oral Daily    cyanocobalamin (VITAMIN B-12) tablet 50 mcg  50 mcg Oral Daily    fentanyl citrate (PF) 100 MCG/2ML   Intravenous Code/Trauma/Sedation Med    fluticasone-vilanterol 200-25 mcg/actuation 1 puff  1 puff Inhalation BID    [START ON 2/28/2024] furosemide (LASIX) tablet 20 mg  20 mg Oral Daily    heparin (porcine) injection   Intravenous Code/Trauma/Sedation Med    insulin lispro (HumaLOG) 100 units/mL subcutaneous injection 2-12 Units  2-12 Units Subcutaneous TID AC    lidocaine (PF) (XYLOCAINE-MPF) 1 % injection   Infiltration Code/Trauma/Sedation Med    [START ON 2/28/2024] losartan (COZAAR) tablet 100 mg  100 mg Oral Daily    metoprolol succinate (TOPROL-XL) 24 hr tablet 50 mg  50 mg Oral BID    midazolam (VERSED) injection   Intravenous Code/Trauma/Sedation Med    multivitamin-minerals (CENTRUM) tablet 1 tablet  1 tablet Oral Daily    nitroGLYcerin (TRIDIL) 50 mg in 250 mL infusion (premix)   Intravenous Code/Trauma/Sedation Continuous Med    nitroGLYcerin 200 mcg/mL IA bolus   Intra-arterial Code/Trauma/Sedation Med    nitroGLYcerin 200 mcg/mL IC bolus   Intracoronary Code/Trauma/Sedation Med    ondansetron (ZOFRAN) injection 4 mg  4 mg Intravenous Q6H PRN    pantoprazole (PROTONIX) EC tablet 40 mg  40 mg Oral Daily    sodium chloride 0.9 % infusion  125 mL/hr Intravenous Continuous  "   sodium chloride 0.9 % infusion  100 mL/hr Intravenous Continuous    verapamil (ISOPTIN) injection   Intra-arterial Code/Trauma/Sedation Med       Pertinent Labs/diagnostics:        Lab Ressults:  Recent Results (from the past 24 hour(s))   Basic metabolic panel    Collection Time: 02/27/24  8:01 AM   Result Value Ref Range    Sodium 138 135 - 147 mmol/L    Potassium 4.3 3.5 - 5.3 mmol/L    Chloride 106 96 - 108 mmol/L    CO2 24 21 - 32 mmol/L    ANION GAP 8 mmol/L    BUN 20 5 - 25 mg/dL    Creatinine 1.13 0.60 - 1.30 mg/dL    Glucose 170 (H) 65 - 140 mg/dL    Glucose, Fasting 170 (H) 65 - 99 mg/dL    Calcium 8.7 8.4 - 10.2 mg/dL    eGFR 63 ml/min/1.73sq m   Hemoglobin A1C    Collection Time: 02/27/24  8:01 AM   Result Value Ref Range    Hemoglobin A1C 6.9 (H) Normal 4.0-5.6%; PreDiabetic 5.7-6.4%; Diabetic >=6.5%; Glycemic control for adults with diabetes <7.0% %     mg/dl   CBC and Platelet    Collection Time: 02/27/24  8:01 AM   Result Value Ref Range    WBC 24.47 (H) 4.31 - 10.16 Thousand/uL    RBC 4.43 3.88 - 5.62 Million/uL    Hemoglobin 13.2 12.0 - 17.0 g/dL    Hematocrit 40.6 36.5 - 49.3 %    MCV 92 82 - 98 fL    MCH 29.8 26.8 - 34.3 pg    MCHC 32.5 31.4 - 37.4 g/dL    RDW 13.5 11.6 - 15.1 %    Platelets 254 149 - 390 Thousands/uL    MPV 11.0 8.9 - 12.7 fL           Lipid Profile:   No results found for: \"CHOL\"  Lab Results   Component Value Date    HDL 37 (L) 11/30/2023     Lab Results   Component Value Date    LDLCALC 55 11/30/2023     Lab Results   Component Value Date    TRIG 180 (H) 11/30/2023         Cardiac testing:   No results found for this or any previous visit.    No results found for this or any previous visit.    No valid procedures specified.  No results found for this or any previous visit.      Tele:  NSR , no ectopy.         Discharge instructions/Information to patient and family:   See after visit summary for information provided to patient and family.      Provisions for " Follow-Up Care:  See after visit summary for information related to follow-up care and any pertinent home health orders.      Planned Readmission: No    Discharge Statement:  I spent 45 minutes minutes discharging the patient. This time was spent on the day of discharge. I had direct contact with the patient on the day of discharge. Additional documentation is required if more than 30 minutes were spent on discharge.         ** Please Note: Fluency Direct Dictation voice to text software may have been used in the creation of this document. **

## 2024-02-27 NOTE — H&P
Cardiac Catheterization -  Outpatient H&P  Cam Wilkins 73 y.o. male MRN: [id number]  Unit/Bed#: BE CATH LAB ROOM Encounter: [id number]       PCP: Tremayne Lewis MD  Outpatient Cardiologist: Dr. Shelby Matson    History of Present Illness   HPI:  Cam Wilkins is a 73 y.o. male w/ CAD (inf STEMI on treadmill s/p RCA stent 2013), HTN, DM, PAF, SVT, HLD, CLL (B cell lymphocytosis; under surveillance), H/O idiopathic pericarditis, effusion s/p pericardiocentesis (in 2020), nonsmoker who p/w worsening angina was referred for LHC +/- PCI. Patient underwent noninvasive workup with stress echo in 2022, which was positive, but the patient declined LHC at the time. Patient agrees to proceed with the procedure this time, given worsening symptoms. 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      Historical Information   Past Medical History:   Diagnosis Date    Asthma     BPH (benign prostatic hyperplasia)     DM (diabetes mellitus), type 2 (HCC)     Hearing loss     Hypertension     Nocturia      Past Surgical History:   Procedure Laterality Date    CORONARY ANGIOPLASTY WITH STENT PLACEMENT N/A 2013    EYE SURGERY      KNEE SURGERY      TONSILLECTOMY       Social History   Social History     Substance and Sexual Activity   Alcohol Use Yes     Social History     Substance and Sexual Activity   Drug Use No     Social History     Tobacco Use   Smoking Status Never   Smokeless Tobacco Never     Family History:   Family History   Problem Relation Age of Onset    Cancer Mother     Heart failure Father        Meds/Allergies   all medications and allergies reviewed  Allergies   Allergen Reactions    Celecoxib Palpitations    Clarithromycin     Rofecoxib Palpitations and Other (See Comments)     Heart palpations.     Other reaction(s): Erythema    All salmon inhibitors.       Physical Exam  /73 (BP Location: Right arm)   Pulse 61   Temp 98 °F (36.7 °C)  "(Temporal)   Resp 16   Ht 6' 0.5\" (1.842 m)   Wt 112 kg (248 lb)   SpO2 99%   BMI 33.17 kg/m²       GEN: Cam Wilkins appears well, alert and oriented x 3, pleasant and cooperative   HEENT:  Normocephalic, atraumatic, anicteric, moist mucous membranes  NECK: no JVD; no carotid bruits   HEART: RRR, normal S1 and S2, no murmurs, clicks, gallops or rubs   LUNGS: Clear to auscultation bilaterally; no wheezes, rales, or rhonchi; respiration nonlabored   ABDOMEN:  Normoactive bowel sounds, soft, no tenderness, no distention  EXTREMITIES: No edema  NEURO: no gross focal findings; cranial nerves grossly intact   SKIN:  Dry, intact, warm to touch  ACCESS: 2+ right radial Goyo's, 2+ right femoral pulse      Previous Cath/PCI:  No results found for this or any previous visit.    No results found for this or any previous visit.    No results found for this or any previous visit.    No results found for this or any previous visit.      Previous STRESS TEST:  No results found for this or any previous visit.     No results found for this or any previous visit.    No results found for this or any previous visit.      ECHO:  No results found for this or any previous visit.    No results found for this or any previous visit.      ANDREWS:  No results found for this or any previous visit.    No results found for this or any previous visit.      CMR:  No results found for this or any previous visit.    No results found for this or any previous visit.    No results found for this or any previous visit.        Lab Results: I have personally reviewed pertinent lab results.    Results from last 7 days   Lab Units 02/25/24  0825   POTASSIUM mmol/L 4.2   CO2 mmol/L 27   CHLORIDE mmol/L 104   BUN mg/dL 21   CREATININE mg/dL 1.15             Results from last 7 days   Lab Units 02/25/24  0825   WBC Thousand/uL 31.35*   HEMOGLOBIN g/dL 13.9   HEMATOCRIT % 42.3   MCV fL 90   PLATELETS Thousands/uL 272             Assessment/Plan "     Assessment:  73 y.o. male patient w/ CAD (inf STEMI on treadmill s/p RCA stent 2013), HTN, DM, PAF, SVT, HLD, CLL (B cell lymphocytosis; under surveillance), H/O idiopathic pericarditis, effusion s/p pericardiocentesis (in 2020), nonsmoker who p/w worsening angina was referred for LHC +/- PCI. Patient underwent noninvasive workup with stress echo in 2022, which was positive, but the patient declined LHC at the time. Patient agrees to proceed with the procedure this time, given worsening symptoms. 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    Worsening angina  Known CAD (RCA stent)  CLL, lymphocytosis      Plan:  - Plan for LHC +/- PCI  - To be loaded with aspirin and plavix  - Consent to be obtained        Case discussed and reviewed with Dr. Briggs who agrees with my assessment and plan.    Thank you for involving us in the care of your patient.      Terrance Pickering,    Interventional Cardiology Fellow  PGY-[id number]      ==========================================================================================    Epic/ Allscripts/Care Everywhere records reviewed: Yes    ** Please Note: Fluency DirectDictation voice to text software may have been used in the creation of this document. **

## 2024-02-28 VITALS
RESPIRATION RATE: 16 BRPM | TEMPERATURE: 97.8 F | OXYGEN SATURATION: 96 % | SYSTOLIC BLOOD PRESSURE: 133 MMHG | HEIGHT: 73 IN | DIASTOLIC BLOOD PRESSURE: 70 MMHG | BODY MASS INDEX: 32.87 KG/M2 | HEART RATE: 75 BPM | WEIGHT: 248 LBS

## 2024-02-28 LAB
ANION GAP SERPL CALCULATED.3IONS-SCNC: 10 MMOL/L
BUN SERPL-MCNC: 17 MG/DL (ref 5–25)
CALCIUM SERPL-MCNC: 7.8 MG/DL (ref 8.4–10.2)
CHLORIDE SERPL-SCNC: 103 MMOL/L (ref 96–108)
CO2 SERPL-SCNC: 23 MMOL/L (ref 21–32)
CREAT SERPL-MCNC: 1.2 MG/DL (ref 0.6–1.3)
ERYTHROCYTE [DISTWIDTH] IN BLOOD BY AUTOMATED COUNT: 13.6 % (ref 11.6–15.1)
GFR SERPL CREATININE-BSD FRML MDRD: 59 ML/MIN/1.73SQ M
GLUCOSE P FAST SERPL-MCNC: 153 MG/DL (ref 65–99)
GLUCOSE SERPL-MCNC: 138 MG/DL (ref 65–140)
GLUCOSE SERPL-MCNC: 153 MG/DL (ref 65–140)
HCT VFR BLD AUTO: 34.3 % (ref 36.5–49.3)
HGB BLD-MCNC: 11.5 G/DL (ref 12–17)
MCH RBC QN AUTO: 30.3 PG (ref 26.8–34.3)
MCHC RBC AUTO-ENTMCNC: 33.5 G/DL (ref 31.4–37.4)
MCV RBC AUTO: 91 FL (ref 82–98)
PLATELET # BLD AUTO: 220 THOUSANDS/UL (ref 149–390)
PMV BLD AUTO: 10.4 FL (ref 8.9–12.7)
POTASSIUM SERPL-SCNC: 3.5 MMOL/L (ref 3.5–5.3)
RBC # BLD AUTO: 3.79 MILLION/UL (ref 3.88–5.62)
SODIUM SERPL-SCNC: 136 MMOL/L (ref 135–147)
WBC # BLD AUTO: 22.84 THOUSAND/UL (ref 4.31–10.16)

## 2024-02-28 PROCEDURE — 85027 COMPLETE CBC AUTOMATED: CPT

## 2024-02-28 PROCEDURE — 80048 BASIC METABOLIC PNL TOTAL CA: CPT

## 2024-02-28 PROCEDURE — 82948 REAGENT STRIP/BLOOD GLUCOSE: CPT

## 2024-02-28 RX ORDER — ASPIRIN 81 MG/1
81 TABLET, CHEWABLE ORAL DAILY
Start: 2024-02-28

## 2024-02-28 RX ORDER — CLOPIDOGREL BISULFATE 75 MG/1
75 TABLET ORAL DAILY
Qty: 90 TABLET | Refills: 3 | Status: SHIPPED | OUTPATIENT
Start: 2024-02-28

## 2024-02-28 RX ORDER — ASPIRIN 81 MG/1
81 TABLET, CHEWABLE ORAL DAILY
Start: 2024-02-28 | End: 2024-02-28

## 2024-02-28 RX ORDER — ASPIRIN 81 MG/1
81 TABLET, CHEWABLE ORAL DAILY
Status: DISCONTINUED | OUTPATIENT
Start: 2024-02-28 | End: 2024-02-28 | Stop reason: HOSPADM

## 2024-02-28 RX ORDER — ATORVASTATIN CALCIUM 80 MG/1
80 TABLET, FILM COATED ORAL DAILY
Qty: 90 TABLET | Refills: 3 | Status: SHIPPED | OUTPATIENT
Start: 2024-02-28

## 2024-02-28 RX ADMIN — LOSARTAN POTASSIUM 100 MG: 50 TABLET, FILM COATED ORAL at 08:44

## 2024-02-28 RX ADMIN — Medication 2000 UNITS: at 08:43

## 2024-02-28 RX ADMIN — CLOPIDOGREL BISULFATE 75 MG: 75 TABLET ORAL at 08:43

## 2024-02-28 RX ADMIN — FUROSEMIDE 20 MG: 20 TABLET ORAL at 08:45

## 2024-02-28 RX ADMIN — Medication 1 TABLET: at 08:43

## 2024-02-28 RX ADMIN — Medication 50 MCG: at 08:43

## 2024-02-28 RX ADMIN — APIXABAN 5 MG: 5 TABLET, FILM COATED ORAL at 08:43

## 2024-02-28 RX ADMIN — ASPIRIN 81 MG CHEWABLE TABLET 81 MG: 81 TABLET CHEWABLE at 08:46

## 2024-02-28 RX ADMIN — PANTOPRAZOLE SODIUM 40 MG: 40 TABLET, DELAYED RELEASE ORAL at 08:44

## 2024-02-28 RX ADMIN — AMLODIPINE BESYLATE 2.5 MG: 2.5 TABLET ORAL at 08:43

## 2024-02-28 RX ADMIN — ATORVASTATIN CALCIUM 80 MG: 80 TABLET, FILM COATED ORAL at 08:45

## 2024-02-28 RX ADMIN — METOPROLOL SUCCINATE 50 MG: 50 TABLET, EXTENDED RELEASE ORAL at 08:43

## 2024-02-28 NOTE — RESTORATIVE TECHNICIAN NOTE
Restorative Technician Note      Patient Name: Cam Wilkins     Restorative Tech Visit Date: 02/28/24  Note Type: Mobility  Patient Position Upon Consult: Supine  Activity Performed: Transferred  Assistive Device: Other (Comment) (HHA x 1)  Patient Position at End of Consult: All needs within reach; Bedside chair

## 2024-02-29 ENCOUNTER — TELEPHONE (OUTPATIENT)
Age: 74
End: 2024-02-29

## 2024-02-29 DIAGNOSIS — E11.9 TYPE 2 DIABETES MELLITUS WITHOUT COMPLICATION, WITHOUT LONG-TERM CURRENT USE OF INSULIN (HCC): Primary | ICD-10-CM

## 2024-02-29 NOTE — TELEPHONE ENCOUNTER
PA for tirzepatide (Mounjaro) 5 MG/0.5ML     Submitted via    [][employer]   [x][employer]-Case ID # [id number]  []Faxed to plan   []Other website   []Phone call Case ID #     Office notes sent, clinical questions answered. Awaiting determination    Turnaround time for your insurance to make a decision on your Prior Authorization can take 7-21 business days.

## 2024-02-29 NOTE — TELEPHONE ENCOUNTER
Patient's spouse Hawa contacted the office this morning asking if the tirzepatide (Mounjaro) 7.5 MG/0.5ML that was prescribed 02/27/24 can be resent to the [facility] in [place] on [address] but for the tirzepatide (Mounjaro) 5 MG/0.5ML , initially discontinued and switched to the higher dose but did not start, as this MG is what they have in stock at the pharmacy. She is asking if patient can continue with the 0.5 for another month as this what is readily available.

## 2024-02-29 NOTE — TELEPHONE ENCOUNTER
PA for  tirzepatide (Mounjaro) 5 MG/0.5ML  Approved   Date(s) approved 2/29/24-12/31/24  Case #[id number]    Patient advised by [x] [employer] Message                      [x] Phone call       Pharmacy advised by [x]Fax                                     []Phone call    Approval letter scanned into Media Yes

## 2024-03-07 LAB
LEFT EYE DIABETIC RETINOPATHY: NORMAL
RIGHT EYE DIABETIC RETINOPATHY: NORMAL

## 2024-03-08 ENCOUNTER — HOSPITAL ENCOUNTER (OUTPATIENT)
Dept: NON INVASIVE DIAGNOSTICS | Facility: HOSPITAL | Age: 74
Discharge: HOME | End: 2024-03-08
Attending: STUDENT IN AN ORGANIZED HEALTH CARE EDUCATION/TRAINING PROGRAM
Payer: MEDICARE

## 2024-03-08 VITALS
HEIGHT: 72 IN | BODY MASS INDEX: 33.59 KG/M2 | HEART RATE: 65 BPM | WEIGHT: 248 LBS | SYSTOLIC BLOOD PRESSURE: 117 MMHG | DIASTOLIC BLOOD PRESSURE: 57 MMHG

## 2024-03-08 DIAGNOSIS — I25.10 CORONARY ARTERY DISEASE INVOLVING NATIVE CORONARY ARTERY WITHOUT ANGINA PECTORIS, UNSPECIFIED WHETHER NATIVE OR TRANSPLANTED HEART: ICD-10-CM

## 2024-03-08 LAB
AORTIC ROOT: 3.5 CM
AORTIC VALVE MEAN VELOCITY: 8.8 M/S
ASCENDING AORTA: 3.3 CM
AV AREA BY CONTINUOUS VTI: 2.6 CM2
AV AREA PEAK VELOCITY: 2.7 CM2
AV LVOT MEAN GRADIENT: 1 MMHG
AV LVOT PEAK GRADIENT: 2 MMHG
AV MEAN GRADIENT: 4 MMHG
AV PEAK GRADIENT: 7 MMHG
AV VALVE AREA: 2.59 CM2
AV VELOCITY RATIO: 0.59
BSA FOR ECHO PROCEDURE: 2.33 M2
DOP CALC AO PEAK VEL: 1.27 M/S
DOP CALC AO VTI: 31.11 CM
DOP CALC LVOT AREA: 4.52 CM2
DOP CALC LVOT CARDIAC INDEX: 1.99 L/MIN/M2
DOP CALC LVOT CARDIAC OUTPUT: 4.68 L/MIN
DOP CALC LVOT DIAMETER: 2.4 CM
DOP CALC LVOT PEAK VEL VTI: 17.81 CM
DOP CALC LVOT PEAK VEL: 0.75 M/S
DOP CALC LVOT STROKE INDEX: 33.6 ML/M2
DOP CALC LVOT STROKE VOLUME: 80.53
E WAVE DECELERATION TIME: 227 MS
E/A RATIO: 1.25
FRACTIONAL SHORTENING: 33 (ref 28–44)
INTERVENTRICULAR SEPTUM IN DIASTOLE (PARASTERNAL SHORT AXIS VIEW): 1.1 CM
INTERVENTRICULAR SEPTUM: 1.1 CM (ref 0.6–1.1)
LAAS-AP2: 26.5 CM2
LAAS-AP4: 27.3 CM2
LEFT ATRIUM SIZE: 4.3 CM
LEFT ATRIUM VOLUME (MOD BIPLANE): 97 ML
LEFT ATRIUM VOLUME INDEX (MOD BIPLANE): 41.3 ML/M2
LEFT INTERNAL DIMENSION IN SYSTOLE: 3.4 CM (ref 2.1–4)
LEFT VENTRICULAR INTERNAL DIMENSION IN DIASTOLE: 5.1 CM (ref 3.5–6)
LEFT VENTRICULAR POSTERIOR WALL IN END DIASTOLE: 1.2 CM
LEFT VENTRICULAR STROKE VOLUME: 79 ML
LVSV (TEICH): 79 ML
MV E'TISSUE VEL-LAT: 11 CM/S
MV E'TISSUE VEL-SEP: 9 CM/S
MV PEAK A VEL: 0.59 M/S
MV PEAK E VEL: 74 CM/S
MV STENOSIS PRESSURE HALF TIME: 66 MS
MV VALVE AREA P 1/2 METHOD: 3.33
RIGHT ATRIAL 2D VOLUME: 82 ML
RIGHT ATRIUM AREA SYSTOLE A4C: 24.6 CM2
RIGHT VENTRICLE ID DIMENSION: 4.4 CM
SL CV LEFT ATRIUM LENGTH A2C: 6 CM
SL CV PED ECHO LEFT VENTRICLE DIASTOLIC VOLUME (MOD BIPLANE) 2D: 126 ML
SL CV PED ECHO LEFT VENTRICLE SYSTOLIC VOLUME (MOD BIPLANE) 2D: 47 ML
TR MAX PG: 36 MMHG
TR PEAK VELOCITY: 3 M/S
TRICUSPID ANNULAR PLANE SYSTOLIC EXCURSION: 2.8 CM
TRICUSPID VALVE PEAK REGURGITATION VELOCITY: 2.99 M/S

## 2024-03-08 PROCEDURE — 93306 TTE W/DOPPLER COMPLETE: CPT | Performed by: INTERNAL MEDICINE

## 2024-03-08 PROCEDURE — C8929 TTE W OR WO FOL WCON,DOPPLER: HCPCS

## 2024-03-08 RX ADMIN — PERFLUTREN 0.4 ML/MIN: 6.52 INJECTION, SUSPENSION INTRAVENOUS at 15:42

## 2024-03-18 ENCOUNTER — OFFICE VISIT (OUTPATIENT)
Dept: CARDIOLOGY CLINIC | Facility: CLINIC | Age: 74
End: 2024-03-18
Payer: MEDICARE

## 2024-03-18 ENCOUNTER — TELEPHONE (OUTPATIENT)
Dept: CARDIOLOGY CLINIC | Facility: CLINIC | Age: 74
End: 2024-03-18

## 2024-03-18 VITALS
SYSTOLIC BLOOD PRESSURE: 110 MMHG | HEIGHT: 72 IN | BODY MASS INDEX: 34 KG/M2 | WEIGHT: 251 LBS | DIASTOLIC BLOOD PRESSURE: 54 MMHG | HEART RATE: 57 BPM

## 2024-03-18 DIAGNOSIS — I25.10 CORONARY ARTERY DISEASE INVOLVING NATIVE CORONARY ARTERY WITHOUT ANGINA PECTORIS, UNSPECIFIED WHETHER NATIVE OR TRANSPLANTED HEART: Primary | ICD-10-CM

## 2024-03-18 DIAGNOSIS — I31.9 PERICARDITIS, UNSPECIFIED CHRONICITY, UNSPECIFIED TYPE: ICD-10-CM

## 2024-03-18 DIAGNOSIS — E78.2 MIXED HYPERLIPIDEMIA: ICD-10-CM

## 2024-03-18 DIAGNOSIS — R25.2 CRAMPING OF HANDS: ICD-10-CM

## 2024-03-18 DIAGNOSIS — C91.10 CLL (CHRONIC LYMPHOCYTIC LEUKEMIA) (HCC): ICD-10-CM

## 2024-03-18 DIAGNOSIS — I10 PRIMARY HYPERTENSION: ICD-10-CM

## 2024-03-18 DIAGNOSIS — R94.31 ABNORMAL EKG: ICD-10-CM

## 2024-03-18 DIAGNOSIS — I48.0 PAROXYSMAL ATRIAL FIBRILLATION (HCC): ICD-10-CM

## 2024-03-18 DIAGNOSIS — I31.39 PERICARDIAL EFFUSION: ICD-10-CM

## 2024-03-18 DIAGNOSIS — E11.9 TYPE 2 DIABETES MELLITUS WITHOUT COMPLICATION, WITHOUT LONG-TERM CURRENT USE OF INSULIN (HCC): ICD-10-CM

## 2024-03-18 DIAGNOSIS — Z98.890 S/P PERICARDIOCENTESIS: ICD-10-CM

## 2024-03-18 PROCEDURE — 93000 ELECTROCARDIOGRAM COMPLETE: CPT | Performed by: STUDENT IN AN ORGANIZED HEALTH CARE EDUCATION/TRAINING PROGRAM

## 2024-03-18 PROCEDURE — 99214 OFFICE O/P EST MOD 30 MIN: CPT | Performed by: STUDENT IN AN ORGANIZED HEALTH CARE EDUCATION/TRAINING PROGRAM

## 2024-03-18 RX ORDER — APIXABAN 5 MG/1
5 TABLET, FILM COATED ORAL 2 TIMES DAILY
Qty: 90 TABLET | Refills: 4 | Status: SHIPPED | OUTPATIENT
Start: 2024-03-18

## 2024-03-18 NOTE — PROGRESS NOTES
[facility] CARDIOLOGY [place]  [address] [address]  Phone#  [phone]  Fax#  [phone]  [facility]'s Cardiology Office Follow-up Visit             NAME: Cam Wilkins  AGE: 73 y.o. SEX: male   : 1950   MRN: [id number]  Assessment and plan:  1. Coronary artery disease involving native coronary artery without angina pectoris, unspecified whether native or transplanted heart  -     POCT ECG    2. Primary hypertension    3. Paroxysmal atrial fibrillation (HCC)  -     Magnesium; Future  -     Eliquis 5 MG; Take 1 tablet (5 mg total) by mouth 2 (two) times a day    4. Mixed hyperlipidemia    5. Pericardial effusion    6. S/P pericardiocentesis    7. Pericarditis, unspecified chronicity, unspecified type    8. Type 2 diabetes mellitus without complication, without long-term current use of insulin (HCC)    9. CLL (chronic lymphocytic leukemia) (HCC)    10. Cramping of hands  -     Basic metabolic panel; Future    11. Abnormal EKG    Previous work up:              -10/2022 Stress echo for CORDERO: Nate protocol, 3 min 29 sec, 4.7 METs, 112 %MPHR, dyspnea, hypertensive response, HR demonstarted deconditioned response, EKG with 1 mm downslopping ST depression in inferolateral lead in late recovery, positive for ischemia, SVT with HR in 150s during recovery, LV dilation post stress.              -2020 Cardiac event monitor: min HR of 54 bpm, max HR of 194 bpm, and avg HR of 82 bpm. Predominant underlying rhythm was Sinus Rhythm. 35 Supraventricular Tachycardia runs occurred, the run with the fastest interval lasting 5 beats with a max rate of 194 bpm, the longest lasting 1 min 18 secs with an avg rate of 147 bpm. Isolated SVEs were rare (<1.0%), SVE Couplets were rare (<1.0%), and SVE Triplets were rare (<1.0%). Isolated VEs were occasional (3.4%, 17120), VE Couplets were rare (<1.0%, 295), and VE Triplets were rare (<1.0%, 1). Ventricular Bigeminy and Trigeminy were present.                -3/2023 extended heart monitor (Zio patch) Analysis time 2 days and 10 hours. Predominantly normal sinus rhythm at an average HR 72 bpm.  frequent runs of SVT. The fastest run was a 10 beat run of PSVT at 184 bpm.  The longest run was a 9-minute and 7-second run of PSVT at 148 minutes.  Review of tracings suggest possible atrial tachycardia.  It appears symptomatic patient event occurred within 45 seconds of noted SVT episodes.  Rare ventricular ectopy.  No significant pauses or heart block.               -2/2022 TTE: normal biV function, LVEF 55-60%, trace MR, trace TR     -CAD: in 2013 had exercise stress during which was concern for ACS thus was taken to [facility] and had PCI to RCA. Per patient's description he had either dissection or aneurism of coronary artery.  Stress echo from 10/2022 is abnormal with EKG changes suggestive of ischemia, LV dilatation and SVT in recovery.    -LHC  in 2/27/24 with mid Lcx 80% stenosis and d LCX 90% s/p DESx2 to LCX (new lesions) and mRCA 90% stenosis, s/p DESx1 to RCA (distal to previous stent).   -DAPT x 7 days, now on plavix 75 mg qd and eliquis 5 mg bid, in 6 month (9/2024) will be on aspirin 81 mg qd and apixaban.continue amlodipine, metoprolol, atorvastatin.  -BP is controlled. Continue amlodipine 2.5 mg daily, furosemide 20 mg daily, olmesartan 40 mg daily, metoprolol extended release 50 mg twice daily.  -Lipid panel 11/2023: , , HDL 37, LDL 55 (goal LDL under 55). This was done on atorvastatin 40 gm qd. Dicussed importance of diet changes and weight loss for triglycerides management. atorvastatin was increased to 80 mg daily after LHC. No myalgias. If develops side effects will cut back to 40 mg qd as goal LDL was achieved at that dosage.   -in normal sinus rhythm today. Continue apixaban 5 mg bid. He had an episode of afib RVR with rate in 130s-150 bpm. I  discussed that given soft blood pressure and resting heart rate in 50s we have limited options on  "increasing dose of AV blocking agents but we can try metoprolol 75 mg twice daily.  I also discussed option for A-fib ablation in view of limited option for medication up titration.  At this time he is not interested in either and preferred to continue with metoprolol 50 mg twice daily.  He will let us know if has more episodes of atrial fibrillation.  -trivial pericardial effusion on TTE from 2/2022. No pericardial effusion on TTE from 3/2024.  -palpitations are less frequent that before. If increase then would repeat heart monitor. Continue metoprolol.  -CLL: follows with [facility]. No cytopenias. Frequent surveillance.  -Follow-up BMP for potassium levels and magnesium to decide if need supplementation.  -EKG today with usual P axis, possible ectopic atrial bradycardia.  On his smart watch he is in sinus rhythm except an episode of afib.    RTC 6 month    Cc: \"follow up on cardiac problems\"    HPI:  1/15/2024: Cam Wilkins is a 73 y.o. male who presents to the cardiology clinic to establish care. [facility] but would like to switch all his care to [facility]. Per patient he has a history of CAD with ACS (see A/P), and pericarditis with pericardial effusion s/p drainage in 2020. At that time he was noted to have atrial fibrilaltion and was started on eliquis and metoprolol. He had stress echo done in 10/2022 for dyspnea on exertion, which was abnormal with EKG changes positive for ischemia, SVT in recovery and LV dilation in post stress. He was recommended to have LHC but he declined as was concerned for risk associated with procedure. He got second opinion in [place] with his previous cardiologist, who managed him during initial LHC, who also offered LHC but patient declined as this is far from his current living place, [place]. He thinks that dyspnea on exertion is possibly due to deconditioning and he works with PT now. He admits to  " occasional palpitations similar to the ones for which he had extended heart monitor in 3/2023, which showed frequent SVTs but overall reports decrease in frequency.     3/18/2024:  Underwent LHC due to dyspnea on exertion and abnormal stress test (see results above). reports one incidence of atrial fibrillation, felt dizzy, checked on smart watch, which showed afib RVR, HR in 130-170s bpm, he took his wife metoprolol tartrate (not sure of the dose) with improvement in the rate and eventual conversion to sinus rhythm.  Reports recently having cramping in bilateral hands.  Denies neuropathy.     [facility]: never smoker, occasional alcohol intake  Fhx: mother  in her 70s from heart failure, had afib    Review of Systems denies chest pain, dyspnea on exertion, palpitations, leg swelling, orthopnea, paroxysmal exertional dyspnea or syncope.      Past history, family history, social history, current medications, vital signs, recent lab and imaging studies and  prior cardiology studies reviewed independently on this visit.    Allergies   Allergen Reactions    Celecoxib Palpitations    Clarithromycin     Rofecoxib Palpitations and Other (See Comments)     Heart palpations.     Other reaction(s): Erythema    All salmon inhibitors.     Current Outpatient Medications:     amLODIPine (NORVASC) 2.5 mg tablet, amlodipine 2.5 mg tablet  TAKE ONE TABLET BY MOUTH EVERY DAY, Disp: , Rfl:     atorvastatin (LIPITOR) 80 mg tablet, Take 1 tablet (80 mg total) by mouth daily, Disp: 90 tablet, Rfl: 3    Cholecalciferol (Vitamin D3) 50 MCG (2000 UT) capsule, Take 2,000 Units by mouth daily, Disp: , Rfl:     clopidogrel (PLAVIX) 75 mg tablet, Take 1 tablet (75 mg total) by mouth daily, Disp: 90 tablet, Rfl: 3    Eliquis 5 MG, Take 1 tablet (5 mg total) by mouth 2 (two) times a day, Disp: 90 tablet, Rfl: 4    Fluticasone-Salmeterol (Advair Diskus) 250-50 mcg/dose inhaler, Inhale 1 puff 2 (two) times a day, Disp: 60 blister, Rfl: 2    furosemide  (LASIX) 20 mg tablet, Take 20 mg by mouth daily, Disp: , Rfl:     ipratropium-albuterol (COMBIVENT RESPIMAT) inhaler, as needed, Disp: , Rfl:     metFORMIN (GLUCOPHAGE) 500 mg tablet, Take 2 in AM one In PM, Disp: , Rfl:     metoprolol succinate (TOPROL-XL) 50 mg 24 hr tablet, Take 50 mg by mouth 2 (two) times a day, Disp: , Rfl:     multivitamin-minerals (CENTRUM) tablet, Take 1 tablet by mouth daily, Disp: , Rfl:     olmesartan (BENICAR) 40 mg tablet, olmesartan 40 mg tablet, Disp: , Rfl:     pantoprazole (PROTONIX) 40 mg tablet, Take 40 mg by mouth daily, Disp: , Rfl:     tirzepatide (Mounjaro) 5 MG/0.5ML, Inject 0.5 mL (5 mg total) under the skin once a week For diabetes, Disp: 2 mL, Rfl: 1    vitamin B-12 (CYANOCOBALAMIN) 50 MCG TABS, Place 1 tablet under the tongue daily, Disp: , Rfl:     Zinc 10 MG LOZG, Take by mouth, Disp: , Rfl:     Objective:   Vitals:    03/18/24 1333   BP: 110/54   Pulse: 57     Wt Readings from Last 3 Encounters:   03/18/24 114 kg (251 lb)   03/08/24 112 kg (248 lb)   02/27/24 112 kg (248 lb)     Pulse Readings from Last 3 Encounters:   03/18/24 57   03/08/24 65   02/28/24 75     BP Readings from Last 3 Encounters:   03/18/24 110/54   03/08/24 117/57   02/28/24 133/70     Physical Exam  General Appearance:    Alert, cooperative, no distress, appears stated age   Head:    atraumatic   Neck:   Supple,  no carotid  bruit or JVD   Lungs:     Clear to auscultation bilaterally, respirations unlabored   Chest wall:    No tenderness or deformity   Heart:    Regular rate and rhythm, S1 and S2 normal, no murmur, rub    or gallop   Abdomen:     Soft, non-tender, no organomegaly   Extremities:   Extremities no cyanosis or edema   Skin:   Skin color, texture, turgor normal, no rashes or lesions     Pertinent Laboratory/Diagnostic Studies:    Laboratory studies reviewed personally by Shelby Gasimli-Vivian, DO    BMP:   Lab Results   Component Value Date    SODIUM 136 02/28/2024    K 3.5 02/28/2024  "    02/28/2024    CO2 23 02/28/2024    BUN 17 02/28/2024    CREATININE 1.20 02/28/2024    GLUC 153 (H) 02/28/2024    CALCIUM 7.8 (L) 02/28/2024     CBC:  Lab Results   Component Value Date    WBC 22.84 (H) 02/28/2024    HGB 11.5 (L) 02/28/2024    HCT 34.3 (L) 02/28/2024    MCV 91 02/28/2024     02/28/2024     Lipid Profile:   Lab Results   Component Value Date    HDL 37 (L) 11/30/2023     Lab Results   Component Value Date    LDLCALC 55 11/30/2023     Lab Results   Component Value Date    TRIG 180 (H) 11/30/2023      Other labs:  Lab Results   Component Value Date    TSH 3.88 04/05/2023     Lab Results   Component Value Date    ALT 33 02/15/2023    AST 18 02/15/2023     Imaging Studies:  Pertinent imaging studies and cardiac studies were independently reviewed on this visit and findings summarized.    Shelby Matson DO  Portions of the record may have been created with voice recognition software.  Occasional wrong word or \"sound alike\" substitutions may have occurred due to the inherent limitations of voice recognition software.  Read the chart carefully and recognize, using context, where substitutions have occurred. Please reach out to me directly for any clarifications.   "

## 2024-03-21 ENCOUNTER — TELEPHONE (OUTPATIENT)
Dept: UROLOGY | Facility: MEDICAL CENTER | Age: 74
End: 2024-03-21

## 2024-04-03 NOTE — PROGRESS NOTES
"Cardiac Rehabilitation Plan of Care   INITIAL         Today's date: 2024   # of Exercise Sessions Completed: 1  Patient name: Cam Wilkins      : 1950  Age: 73 y.o.       MRN: [id number]  Referring Physician: Jimbo Briggs MD  Cardiologist: Shelby Matson DO    Provider: [place]  Clinician: Joie Corcoran MS, CEP    Dx:   Encounter Diagnosis   Name Primary?    Status post insertion of drug eluting coronary artery stent        Description of Diagnosis: Angioplasty and DARRION placed at the sites of 80% and 90% stenosis of mid and distal CX, 90% stenosis of mid RCA. 0% residual stenosis.     Date of onset: 24      Dr. Matson,   Please review the following patient assessment for Norman to begin cardiac rehabilitation post stent placement x3.  Please verify you agree with the outlined plan of care and exercise prescription by signing with attached order.    Thank You.      SUMMARY OF PROGRESS:  Today is Norman's initial evaluation to begin Cardiac Rehab now 5 wks post DARRION to mid CX and mid RCA 80% and 90% stenosis. He had been experiencing SOB with activity that he has not tested since his cath. Norman has a history of CAD s/p stent (midRCA), HTN, HLD, Paroxysmal Afib, SVT, pericardial effusion, pericarditis, s/p pericardiocentesis, DM II (avg -141 - checking 1 time/day), chronic lymphocytic leukemia, and low back pain, and joint pain. He does not currently follow a formal exercise program at home, and tries not to \"excite\" his joints. Norman had been attending physical therapy in Nov-Dec 2023 and found stretching most effective, along with light weight training. He has resumed all ADLs (except stairs) and is limited by joint pain. Today, Norman completed an initial 6MWT. He walked 1,215 feet/2.8 METs. Resting HR 64 bpm and resting  BP of 142/74 with appropriate hemodynamic response to exercise; BP reaching 172/66 and peak HR of 112 bpm. Norman reported 4/10 CORDERO and was limited by 4/10 " bilateral knee pain and L hip pain. Telemetry revealed NSR with occasional PVC and PAC. Norman was counseled on exercise guidelines and the goal to achieve a minimum of 150 mins/wk of moderate intensity (RPE 4-6) exercise per AHA guidelines. We also briefly discussed current dietary habits and goals of heart healthy eating. Norman reports understanding what he should and should not eat, although he does not always follow it. Depression and anxiety were assessed with PHQ-9 and BRADLEY-7 questionnaires with scores of 2 and 1 respectively, suggesting  1-4 = Minimal Depression and  0-4  = Not anxious. When addressed, Norman denies having depression/anxiety symptoms and reports very good stress management. He is very level headed and steady. Norman will attend group education classes on heart healthy eating, reading food labels, stress management, risk factor reduction, understanding heart disease and common heart medications. He will attend 35 monitored exercise sessions, 3x/wk for 12-18 weeks beginning Monday, April 8.       Medication compliance: Yes   Comments: Pt reports to be compliant with medications    Fall Risk: Moderate   Comments: Ambulates with a steady gait with no assist device, Denies a fall in the past 6 months, and reports significant joint problems that limits his walking    Physical Limitations: low back pain (PT), joint pain      EXERCISE ASSESSMENT and PLAN    ECG Interpretation: NSR, occ PVC and PAC    SMART Goals:   reduced dyspnea with physical activity    improved DASI score by 10%  increased exercise capacity by 40% based on peak METs tolerated in cardiac rehab exercise session  maintain > 150 minutes per week of moderate intensity exercise  improved 6MWT distance by 10%    Patient Specific EXERCISE GOALS:   (home exercise, ADLs, recreation):  attend rehab regularly and return to regular exercise regimen    Patient's progress toward SMART and personal goals: reviewed goals today    Plan For next 30 days: Group  class: Risk Factors for Heart Disease and start cardiac rehab 3 days/week    Current Aerobic Exercise Prescription:      Frequency: 3 days/week      Minutes: 30 - 40         METS: 2.2-3.0            HR: (50-70% HRR):  105-121 bpm   RPE: 4-6         Modalities: Treadmill, UBE, Lifecycle, NuStep, Recumbent bike, and Room walking     Exercise workloads will be progressed gradually as tolerated, within limits of patient's ability, and according to the patient's response to the exercise program.      30 Day Goals for Aerobic Exercise Progression:    Frequency: 3 days/week of cardiac rehab       Supplement with home exercise 2+ days/wk as tolerated    Minutes: 40                            >150 mins/wk of moderate intensity exercise   METS: 2.5-3.8   HR: 105-121     RPE: 4-6   Modalities: Treadmill, UBE, Lifecycle, Elliptical, NuStep, and Recumbent bike    Strength training:  Will be added following 2-3 weeks of monitored exercise sessions   Modalities: Leg Press, Chest Press, Arm Extension, Arm Curl, Upright Rows, Front Raises, and Calf Raises    Home Exercise: none    Group and Individual Education: benefit of exercise for CAD risk factors and RPE scale       Readiness to change: Preparation:  (Getting ready to change)       NUTRITION ASSESSMENT AND PLAN    Weight control:    Starting weight: 251.6 lb   Current weight:       Waist circumference:    Startin in   Current:      Diabetes: T2D, Patient monitors BS 1 times/day, Patient reported fasting -141  A1c: 6.9%    last measured: 24    Lipid management: Last lipid profile 2023  Chol 128    HDL 37  LDL 55    SMART Goals:   TRG <150, reduced waist circumference <40 inches (M), Improved Rate Your Plate score  >64, and Wt. loss 1 ppw,  goal of 220 lbs. (On his scale at home)    Patient Specific NUTRITION GOALS:  (wt control, diabetes management, dietary modifications): weight loss and fit better in some clothes at home    Patient's progress  toward SMART and personal goals: Reviewed goals today    Plan for next 30 days: replace refined flours with whole grains, increase daily intake of fruits and vegetables, choose lean red meat, reduce intake and /or  rarely eat processed meats , eat more meatless meals, choose desserts such as fruit, deepika food cake, low-fat or fat-free sweets, and never/rarely add salt to food when cooking or at the table    Group and Individual Education: heart healthy eating principles    Readiness to change: Action:  (Changing behavior)      PSYCHOSOCIAL ASSESSMENT AND PLAN    Emotional:  Depression assessment:  PHQ-9 = 2  1-4 = Minimal Depression            Anxiety measure:  BRADLEY-7 = 1  0-4  = Not anxious    Assessment of depression and anxiety    Patient reports they are coping well with good social support and denies depression or anxiety    Self-reported stress level:  1  Stress Management: Practice Relaxation Techniques    Patient's rating of Social support: Good     Psychosocial Assessment as it relates to rehabilitation:   Patient denies issues with his/her family or home life that may affect their rehabilitation efforts.     SMART Goals:     feel less tired with more energy and reduced irritability    Patient Specific PSYCHOCOSOCIAL GOALS:  (stress, emotional well being, social support):   Increased confidence in ability to complete ADLs and other exercise    Patient's progress toward SMART and personal goals: Reviewed goals today    Plan For next 30 days: Practice relaxation techniques and Exercise    Group and Individual Education:  No education needed in this area today    Readiness to change: Maintenance: (Maintaining the behavior change)      OTHER CORE COMPONENTS     Tobacco:   Social History     Tobacco Use   Smoking Status Never   Smokeless Tobacco Never       Tobacco Use Intervention:   N/A:  Patient is a non-smoker     Anginal Symptoms:  CORDERO   NTG use: No prescription    Blood pressure:    Resting:  142/74   Exercise: 172/66    SMART Goals: consistent, controlled resting BP < 130/80 and medication compliance      Patient Specific CORE COMPONENT GOALS    Patient's progress toward SMART and personal goals: Reviewed goals today    Plan For next 30 days: medication compliance, engage in regular exercise, and monitor home BP    Group and Individual Education:  components of blood pressure management, understanding signs and symptoms of hyper/hypoglycemia, effects of exercise and diet on glycemic control, and importance of checking blood sugar before and after exercise    Readiness to change: Preparation:  (Getting ready to change)

## 2024-04-04 ENCOUNTER — CLINICAL SUPPORT (OUTPATIENT)
Dept: CARDIAC REHAB | Facility: CLINIC | Age: 74
End: 2024-04-04
Payer: MEDICARE

## 2024-04-04 DIAGNOSIS — Z95.5 STATUS POST INSERTION OF DRUG ELUTING CORONARY ARTERY STENT: Primary | ICD-10-CM

## 2024-04-04 PROCEDURE — 93797 PHYS/QHP OP CAR RHAB WO ECG: CPT

## 2024-04-05 ENCOUNTER — APPOINTMENT (OUTPATIENT)
Dept: LAB | Facility: HOSPITAL | Age: 74
End: 2024-04-05
Payer: MEDICARE

## 2024-04-05 DIAGNOSIS — I48.0 PAROXYSMAL ATRIAL FIBRILLATION (HCC): ICD-10-CM

## 2024-04-05 DIAGNOSIS — E83.42 HYPOMAGNESEMIA: Primary | ICD-10-CM

## 2024-04-05 DIAGNOSIS — R25.2 CRAMPING OF HANDS: ICD-10-CM

## 2024-04-05 LAB
ANION GAP SERPL CALCULATED.3IONS-SCNC: 7 MMOL/L (ref 4–13)
BUN SERPL-MCNC: 22 MG/DL (ref 5–25)
CALCIUM SERPL-MCNC: 9.2 MG/DL (ref 8.4–10.2)
CHLORIDE SERPL-SCNC: 105 MMOL/L (ref 96–108)
CO2 SERPL-SCNC: 30 MMOL/L (ref 21–32)
CREAT SERPL-MCNC: 1.15 MG/DL (ref 0.6–1.3)
GFR SERPL CREATININE-BSD FRML MDRD: 62 ML/MIN/1.73SQ M
GLUCOSE SERPL-MCNC: 134 MG/DL (ref 65–140)
MAGNESIUM SERPL-MCNC: 1.2 MG/DL (ref 1.9–2.7)
POTASSIUM SERPL-SCNC: 4.1 MMOL/L (ref 3.5–5.3)
SODIUM SERPL-SCNC: 142 MMOL/L (ref 135–147)

## 2024-04-05 PROCEDURE — 36415 COLL VENOUS BLD VENIPUNCTURE: CPT

## 2024-04-05 PROCEDURE — 83735 ASSAY OF MAGNESIUM: CPT

## 2024-04-05 PROCEDURE — 80048 BASIC METABOLIC PNL TOTAL CA: CPT

## 2024-04-08 ENCOUNTER — CLINICAL SUPPORT (OUTPATIENT)
Dept: CARDIAC REHAB | Facility: CLINIC | Age: 74
End: 2024-04-08
Payer: MEDICARE

## 2024-04-08 DIAGNOSIS — Z95.5 STATUS POST INSERTION OF DRUG ELUTING CORONARY ARTERY STENT: Primary | ICD-10-CM

## 2024-04-08 PROCEDURE — 93798 PHYS/QHP OP CAR RHAB W/ECG: CPT

## 2024-04-10 ENCOUNTER — CLINICAL SUPPORT (OUTPATIENT)
Dept: CARDIAC REHAB | Facility: CLINIC | Age: 74
End: 2024-04-10
Payer: MEDICARE

## 2024-04-10 DIAGNOSIS — Z95.5 STATUS POST INSERTION OF DRUG ELUTING CORONARY ARTERY STENT: Primary | ICD-10-CM

## 2024-04-10 PROCEDURE — 93798 PHYS/QHP OP CAR RHAB W/ECG: CPT

## 2024-04-12 ENCOUNTER — CLINICAL SUPPORT (OUTPATIENT)
Dept: CARDIAC REHAB | Facility: CLINIC | Age: 74
End: 2024-04-12
Payer: MEDICARE

## 2024-04-12 DIAGNOSIS — Z95.5 STATUS POST INSERTION OF DRUG ELUTING CORONARY ARTERY STENT: Primary | ICD-10-CM

## 2024-04-12 PROCEDURE — 93798 PHYS/QHP OP CAR RHAB W/ECG: CPT

## 2024-04-15 ENCOUNTER — CLINICAL SUPPORT (OUTPATIENT)
Dept: CARDIAC REHAB | Facility: CLINIC | Age: 74
End: 2024-04-15
Payer: MEDICARE

## 2024-04-15 DIAGNOSIS — Z95.5 STATUS POST INSERTION OF DRUG ELUTING CORONARY ARTERY STENT: Primary | ICD-10-CM

## 2024-04-15 PROCEDURE — 93798 PHYS/QHP OP CAR RHAB W/ECG: CPT

## 2024-04-16 ENCOUNTER — TELEPHONE (OUTPATIENT)
Age: 74
End: 2024-04-16

## 2024-04-16 NOTE — TELEPHONE ENCOUNTER
Pt wife called in, states she feels uncomfortable with the cardiac rehab program her  is in. States she feels it is too demanding on his joints. Would like to know if there are other options. Encouraged her to reach out to cardiac rehab to see if there are any modification options and to discuss her concerns with staff there. She would like to know PCP's opinion and if he has any other suggestions for a different program for pt.

## 2024-04-17 ENCOUNTER — APPOINTMENT (OUTPATIENT)
Dept: CARDIAC REHAB | Facility: CLINIC | Age: 74
End: 2024-04-17
Payer: MEDICARE

## 2024-04-17 NOTE — TELEPHONE ENCOUNTER
Patient having trouble with cardiac rehab, due to severe arthritis of multiple body parts, inquiring about assistive devices, unsure if this is available at [facility] Cardiac rehab, recommend seeing a physiatrist to help devise rehab program, patient will call Gary Vega for appointment.

## 2024-04-17 NOTE — PROGRESS NOTES
Cardiac Rehabilitation Plan of Care   DISCHARGE          Today's date: 2024   # of Exercise Sessions Completed: 5  Patient name: Cam Wilkins      : 1950  Age: 73 y.o.       MRN: [id number]  Referring Physician: Tonya Rodriguez CRNP  Cardiologist: Shelby Matson DO    Provider: [place]  Clinician: Joie Corcoran MS, CEP    Dx:   Encounter Diagnosis   Name Primary?    Status post insertion of drug eluting coronary artery stent Yes       Description of Diagnosis: Angioplasty and DARRION placed at the sites of 80% and 90% stenosis of mid and distal CX, 90% stenosis of mid RCA. 0% residual stenosis.     Date of onset: 24      SUMMARY OF PROGRESS:  Norman has chosen to be discharged from cardiac rehab at this time. He has history of previous injuries and joint pain that limit his activity at home. During cardiac rehab, Norman tolerated up to 38 min of exercise on a recumbent cycle, NuStep and UBE with room walking between each modality and treadmill walking was added for 6 min last session. Norman wears his cloth knee braces for each exercise session and has reported soreness and swelling in his knees after exercise that was worst after Monday's exercise (4/15). Last week, Norman reported swelling at his R wrist after using the arm bike that worsened to bruising and soreness all down his R forearm. Arm bike was not used last session with slight improvement in bruising shown today. I plan to discuss options with his PCP including starting PT which has helped Norman previously.     Resting HR 62-66 bpm with increased HR to 76-99 bpm during exercise. Variable resting /68 - 154/68. NSR noted on telemetry with occ PACs.    24: Today is Norman's initial evaluation to begin Cardiac Rehab now 5 wks post DARRION to mid CX and mid RCA 80% and 90% stenosis. He had been experiencing SOB with activity that he has not tested since his cath. Norman has a history of CAD s/p stent (midRCA), HTN, HLD, Paroxysmal Afib,  "SVT, pericardial effusion, pericarditis, s/p pericardiocentesis, DM II (avg -141 - checking 1 time/day), chronic lymphocytic leukemia, and low back pain, and joint pain. He does not currently follow a formal exercise program at home, and tries not to \"excite\" his joints. Norman had been attending physical therapy in Nov-Dec 2023 and found stretching most effective, along with light weight training. He has resumed all ADLs (except stairs) and is limited by joint pain. Today, Norman completed an initial 6MWT. He walked 1,215 feet/2.8 METs. Resting HR 64 bpm and resting  BP of 142/74 with appropriate hemodynamic response to exercise; BP reaching 172/66 and peak HR of 112 bpm. Norman reported 4/10 CORDERO and was limited by 4/10 bilateral knee pain and L hip pain. Telemetry revealed NSR with occasional PVC and PAC. Norman was counseled on exercise guidelines and the goal to achieve a minimum of 150 mins/wk of moderate intensity (RPE 4-6) exercise per AHA guidelines. We also briefly discussed current dietary habits and goals of heart healthy eating. Norman reports understanding what he should and should not eat, although he does not always follow it. Depression and anxiety were assessed with PHQ-9 and BRADLEY-7 questionnaires with scores of 2 and 1 respectively, suggesting  1-4 = Minimal Depression and  0-4  = Not anxious. When addressed, Norman denies having depression/anxiety symptoms and reports very good stress management.      Medication compliance: Yes   Comments: Pt reports to be compliant with medications    Fall Risk: Moderate   Comments: Ambulates with a steady gait with no assist device, Denies a fall in the past 6 months, and reports significant joint problems that limits his walking    Physical Limitations: low back pain (PT), joint pain      EXERCISE ASSESSMENT and PLAN    ECG Interpretation: NSR, occ PVC and PAC    SMART Goals:   reduced dyspnea with physical activity    improved DASI score by 10%  increased exercise " capacity by 40% based on peak METs tolerated in cardiac rehab exercise session  maintain > 150 minutes per week of moderate intensity exercise  improved 6MWT distance by 10%    Patient Specific EXERCISE GOALS:   (home exercise, ADLs, recreation):  attend rehab regularly and return to regular exercise regimen    Patient's progress toward SMART and personal goals:     Plan For next 30 days:  follow up with PCP regarding next steps - possibly physical therapy    Current Aerobic Exercise Prescription:      Frequency: 3 days/week      Minutes: 30-38         METS: 2.4-3.1           HR: 76-99 bpm   RPE: 4-6         Modalities: Treadmill, UBE, NuStep, Recumbent bike, and Room walking     Exercise workloads will be progressed gradually as tolerated, within limits of patient's ability, and according to the patient's response to the exercise program.      30 Day Goals for Aerobic Exercise Progression:    Frequency: 4-6 days/week of exercise   Minutes: 20-40 - discontinuous   (>150 mins/wk of moderate intensity exercise)   METS: 2-3   HR: 62-66 (RHR) + (20-40 bpm)    RPE: 4-6   Modalities: Treadmill, UBE, Lifecycle, Elliptical, NuStep, and Recumbent bike    Home Exercise: none    Group and Individual Education: benefit of exercise for CAD risk factors, home exercise guidelines, AHA guidelines to achieve >150 mins/wk of moderate exercise, RPE scale, and exercise instructions/guidelines for discharge        Readiness to change: Action:  (Changing behavior)      NUTRITION ASSESSMENT AND PLAN    Weight control:    Starting weight: 251.6 lb   Current weight:   251.6 lb    Waist circumference:    Startin in   Current:      Diabetes: T2D, Patient monitors BS 1 times/day, Patient reported fasting -141  A1c: 6.9%    last measured: 24    Lipid management: Last lipid profile 2023  Chol 128    HDL 37  LDL 55    SMART Goals:   TRG <150, reduced waist circumference <40 inches (M), Improved Rate Your Plate  score  >64, and Wt. loss 1 ppw,  goal of 220 lbs. (On his scale at home)    Patient Specific NUTRITION GOALS:  (wt control, diabetes management, dietary modifications): weight loss and fit better in some clothes at home    Patient's progress toward SMART and personal goals: No change in weight noted in the past 2 weeks, no change in diet choices    Plan for next 30 days: replace refined flours with whole grains, increase daily intake of fruits and vegetables, choose lean red meat, reduce intake and /or  rarely eat processed meats , eat more meatless meals, choose desserts such as fruit, deepika food cake, low-fat or fat-free sweets, and never/rarely add salt to food when cooking or at the table    Group and Individual Education: heart healthy eating principles    Readiness to change: Action:  (Changing behavior)      PSYCHOSOCIAL ASSESSMENT AND PLAN    Emotional:  Depression assessment:  PHQ-9 = 2  1-4 = Minimal Depression            Anxiety measure:  BRADLEY-7 = 1  0-4  = Not anxious    Assessment of depression and anxiety    Patient reports they are coping well with good social support and denies depression or anxiety    Self-reported stress level:  1  Stress Management: Practice Relaxation Techniques    Patient's rating of Social support: Good     Psychosocial Assessment as it relates to rehabilitation:   Patient denies issues with his/her family or home life that may affect their rehabilitation efforts.     SMART Goals:     feel less tired with more energy and reduced irritability    Patient Specific PSYCHOCOSOCIAL GOALS:  (stress, emotional well being, social support):   Increased confidence in ability to complete ADLs and other exercise    Patient's progress toward SMART and personal goals: no change in emotional health    Plan For next 30 days: Practice relaxation techniques and Exercise    Group and Individual Education:  No education needed in this area today    Readiness to change: Maintenance: (Maintaining the  behavior change)      OTHER CORE COMPONENTS     Tobacco:   Social History     Tobacco Use   Smoking Status Never   Smokeless Tobacco Never       Tobacco Use Intervention:   N/A:  Patient is a non-smoker     Anginal Symptoms:  CORDERO   NTG use: No prescription    Blood pressure:    Restin/68 - 154/68   Exercise: 164/72 - 172/66    SMART Goals: consistent, controlled resting BP < 130/80 and medication compliance      Patient Specific CORE COMPONENT GOALS    Patient's progress toward SMART and personal goals: variable BP response to exercise, normal exercise BP response, 100% compliant with medication    Plan For next 30 days: medication compliance, engage in regular exercise, and monitor home BP    Group and Individual Education:  components of blood pressure management, understanding signs and symptoms of hyper/hypoglycemia, effects of exercise and diet on glycemic control, and importance of checking blood sugar before and after exercise    Readiness to change: Action:  (Changing behavior)

## 2024-04-17 NOTE — TELEPHONE ENCOUNTER
Patient's wife called back into office, missed return call from provider. Spoke with Priyanka, will make Dr. Lewis aware to reach back out to patient's wife directly.

## 2024-04-18 ENCOUNTER — OFFICE VISIT (OUTPATIENT)
Dept: FAMILY MEDICINE CLINIC | Facility: CLINIC | Age: 74
End: 2024-04-18
Payer: MEDICARE

## 2024-04-18 ENCOUNTER — NURSE TRIAGE (OUTPATIENT)
Age: 74
End: 2024-04-18

## 2024-04-18 VITALS
BODY MASS INDEX: 34.59 KG/M2 | WEIGHT: 255.4 LBS | HEART RATE: 63 BPM | TEMPERATURE: 96.8 F | SYSTOLIC BLOOD PRESSURE: 138 MMHG | HEIGHT: 72 IN | OXYGEN SATURATION: 97 % | DIASTOLIC BLOOD PRESSURE: 72 MMHG

## 2024-04-18 DIAGNOSIS — T14.8XXA HEMATOMA AND CONTUSION: Primary | ICD-10-CM

## 2024-04-18 DIAGNOSIS — M17.0 PRIMARY OSTEOARTHRITIS OF BOTH KNEES: ICD-10-CM

## 2024-04-18 PROCEDURE — 99213 OFFICE O/P EST LOW 20 MIN: CPT | Performed by: FAMILY MEDICINE

## 2024-04-18 PROCEDURE — G2211 COMPLEX E/M VISIT ADD ON: HCPCS | Performed by: FAMILY MEDICINE

## 2024-04-18 NOTE — TELEPHONE ENCOUNTER
"Received call from patient with concerns about right arm swelling. States that he injured his arm a little over a week ago at cardiac rehab and has since had swelling and bruising to right arm between the elbow and wrist. He is concerned because swelling is not getting better and area is \"pink\" and warm to the touch.  He denies any fever.  He is concerned that he may be developing cellulitis.  Scheduled for OV today with Dr Lewis for evaluation.  "

## 2024-04-18 NOTE — PROGRESS NOTES
Assessment/Plan:       Problem List Items Addressed This Visit    None  Visit Diagnoses       Hematoma and contusion    -  Primary    Primary osteoarthritis of both knees        Relevant Orders    Ambulatory Referral to Physical Therapy            1. Hematoma and contusion    Forearm exam consistent with hematoma/contusion, advised rest call if not resolving over the next few weeks.    2. Primary osteoarthritis of both knees    Unable to tolerate cardiac rehab, encouraged regular exercise, referral provided recommend aqua therapy.    - Ambulatory Referral to Physical Therapy; Future      Subjective:      Patient ID: Cam Wilkins is a 73 y.o. male.    HPI    73 year old with pmh of CLL, Afib, Type 2 Diabetes, presenting for bruising on right arm, after cardiac rehab.    Otherwise feeling well, arm pain over the tendon.      The following portions of the patient's history were reviewed and updated as appropriate: allergies, current medications, past family history, past medical history, past social history, past surgical history and problem list.      Current Outpatient Medications:     amLODIPine (NORVASC) 2.5 mg tablet, amlodipine 2.5 mg tablet  TAKE ONE TABLET BY MOUTH EVERY DAY, Disp: , Rfl:     atorvastatin (LIPITOR) 80 mg tablet, Take 1 tablet (80 mg total) by mouth daily, Disp: 90 tablet, Rfl: 3    Cholecalciferol (Vitamin D3) 50 MCG (2000 UT) capsule, Take 2,000 Units by mouth daily, Disp: , Rfl:     clopidogrel (PLAVIX) 75 mg tablet, Take 1 tablet (75 mg total) by mouth daily, Disp: 90 tablet, Rfl: 3    Eliquis 5 MG, Take 1 tablet (5 mg total) by mouth 2 (two) times a day, Disp: 90 tablet, Rfl: 4    Fluticasone-Salmeterol (Advair Diskus) 250-50 mcg/dose inhaler, Inhale 1 puff 2 (two) times a day, Disp: 60 blister, Rfl: 2    furosemide (LASIX) 20 mg tablet, Take 20 mg by mouth daily, Disp: , Rfl:     ipratropium-albuterol (COMBIVENT RESPIMAT) inhaler, as needed, Disp: , Rfl:     Magnesium Oxide, Elemental,  400 MG TABS, Take 400 mg by mouth 2 (two) times a day, Disp: 90 tablet, Rfl: 3    metFORMIN (GLUCOPHAGE) 500 mg tablet, Take 2 in AM one In PM, Disp: , Rfl:     metoprolol succinate (TOPROL-XL) 50 mg 24 hr tablet, Take 50 mg by mouth 2 (two) times a day, Disp: , Rfl:     multivitamin-minerals (CENTRUM) tablet, Take 1 tablet by mouth daily, Disp: , Rfl:     olmesartan (BENICAR) 40 mg tablet, olmesartan 40 mg tablet, Disp: , Rfl:     pantoprazole (PROTONIX) 40 mg tablet, Take 40 mg by mouth daily, Disp: , Rfl:     tirzepatide (Mounjaro) 5 MG/0.5ML, Inject 0.5 mL (5 mg total) under the skin once a week For diabetes, Disp: 2 mL, Rfl: 1    vitamin B-12 (CYANOCOBALAMIN) 50 MCG TABS, Place 1 tablet under the tongue daily, Disp: , Rfl:     Zinc 10 MG LOZG, Take by mouth, Disp: , Rfl:      Review of Systems   Constitutional:  Negative for activity change and appetite change.   Respiratory:  Negative for apnea and chest tightness.    Cardiovascular:  Negative for chest pain and palpitations.   Gastrointestinal:  Negative for abdominal distention and abdominal pain.   Musculoskeletal:  Negative for arthralgias and back pain.         Objective:      /72 (BP Location: Left arm, Patient Position: Sitting, Cuff Size: Standard)   Pulse 63   Temp (!) 96.8 °F (36 °C) (Tympanic)   Ht 6' (1.829 m)   Wt 116 kg (255 lb 6.4 oz)   SpO2 97%   BMI 34.64 kg/m²          Physical Exam  Constitutional:       Appearance: Normal appearance.   Cardiovascular:      Rate and Rhythm: Normal rate and regular rhythm.      Pulses: Normal pulses.      Heart sounds: Normal heart sounds.   Pulmonary:      Effort: Pulmonary effort is normal.   Abdominal:      General: Abdomen is flat.   Musculoskeletal:         General: Swelling present.      Comments: Bruising swelling right forearm   Neurological:      General: No focal deficit present.      Mental Status: He is alert.           Tremayne Lewis MD

## 2024-04-18 NOTE — TELEPHONE ENCOUNTER
"Reason for Disposition  • MODERATE arm swelling (e.g., puffiness or swollen feeling of entire arm)    Answer Assessment - Initial Assessment Questions  1. ONSET: \"When did the swelling start?\" (e.g., minutes, hours, days)      About a week ago  2. LOCATION: \"What part of the arm is swollen?\"  \"Are both arms swollen or just one arm?\"      Right arm from elbow to wrist  3. SEVERITY: \"How bad is the swelling?\" (e.g., localized; mild, moderate, severe)    - LOCALIZED: Small area of puffiness or swelling on just one arm    - JOINT SWELLING: Swelling of one joint    - MILD: Puffiness or swelling of hand    - MODERATE: Puffiness or swollen feeling of entire arm     - SEVERE: All of arm looks swollen; pitting edema      moderate  4. REDNESS: \"Does the swelling look red or infected?\"      It is black and blue and \"pink\"  5. PAIN: \"Is the swelling painful to touch?\" If Yes, ask: \"How painful is it?\"   (Scale 1-10; mild, moderate or severe)      3-4/10  6. FEVER: \"Do you have a fever?\" If Yes, ask: \"What is it, how was it measured, and when did it start?\"       denies  7. CAUSE: \"What do you think is causing the arm swelling?\"      Injured at cardiac rehab  8. MEDICAL HISTORY: \"Do you have a history of heart failure, kidney disease, liver failure, or cancer?\"      H/o CAD, DM  9. RECURRENT SYMPTOM: \"Have you had arm swelling before?\" If Yes, ask: \"When was the last time?\" \"What happened that time?\"      no  10. OTHER SYMPTOMS: \"Do you have any other symptoms?\" (e.g., chest pain, difficulty breathing)        denies  11. PREGNANCY: \"Is there any chance you are pregnant?\" \"When was your last menstrual period?\"        N/a    Protocols used: Arm Swelling and Edema-ADULT-AH    "

## 2024-04-18 NOTE — PATIENT INSTRUCTIONS
1. Hematoma and contusion    2. Primary osteoarthritis of both knees  -     Ambulatory Referral to Physical Therapy; Future

## 2024-04-19 ENCOUNTER — APPOINTMENT (OUTPATIENT)
Dept: CARDIAC REHAB | Facility: CLINIC | Age: 74
End: 2024-04-19
Payer: MEDICARE

## 2024-04-22 ENCOUNTER — APPOINTMENT (OUTPATIENT)
Dept: CARDIAC REHAB | Facility: CLINIC | Age: 74
End: 2024-04-22
Payer: MEDICARE

## 2024-04-24 ENCOUNTER — APPOINTMENT (OUTPATIENT)
Dept: CARDIAC REHAB | Facility: CLINIC | Age: 74
End: 2024-04-24
Payer: MEDICARE

## 2024-04-26 ENCOUNTER — APPOINTMENT (OUTPATIENT)
Dept: CARDIAC REHAB | Facility: CLINIC | Age: 74
End: 2024-04-26
Payer: MEDICARE

## 2024-04-29 ENCOUNTER — APPOINTMENT (OUTPATIENT)
Dept: CARDIAC REHAB | Facility: CLINIC | Age: 74
End: 2024-04-29
Payer: MEDICARE

## 2024-05-29 ENCOUNTER — RA CDI HCC (OUTPATIENT)
Dept: OTHER | Facility: HOSPITAL | Age: 74
End: 2024-05-29

## 2024-06-05 ENCOUNTER — OFFICE VISIT (OUTPATIENT)
Dept: FAMILY MEDICINE CLINIC | Facility: CLINIC | Age: 74
End: 2024-06-05
Payer: MEDICARE

## 2024-06-05 VITALS
RESPIRATION RATE: 20 BRPM | SYSTOLIC BLOOD PRESSURE: 136 MMHG | HEART RATE: 78 BPM | OXYGEN SATURATION: 97 % | WEIGHT: 237.8 LBS | BODY MASS INDEX: 32.21 KG/M2 | DIASTOLIC BLOOD PRESSURE: 68 MMHG | TEMPERATURE: 96.7 F | HEIGHT: 72 IN

## 2024-06-05 DIAGNOSIS — I25.10 CORONARY ARTERY DISEASE INVOLVING NATIVE CORONARY ARTERY WITHOUT ANGINA PECTORIS, UNSPECIFIED WHETHER NATIVE OR TRANSPLANTED HEART: Primary | ICD-10-CM

## 2024-06-05 DIAGNOSIS — J45.20 MILD INTERMITTENT ASTHMA WITHOUT COMPLICATION: ICD-10-CM

## 2024-06-05 DIAGNOSIS — K21.9 GASTROESOPHAGEAL REFLUX DISEASE, UNSPECIFIED WHETHER ESOPHAGITIS PRESENT: ICD-10-CM

## 2024-06-05 DIAGNOSIS — E11.9 TYPE 2 DIABETES MELLITUS WITHOUT COMPLICATION, WITHOUT LONG-TERM CURRENT USE OF INSULIN (HCC): ICD-10-CM

## 2024-06-05 DIAGNOSIS — Z95.5 STATUS POST INSERTION OF DRUG ELUTING CORONARY ARTERY STENT: ICD-10-CM

## 2024-06-05 DIAGNOSIS — Z00.00 MEDICARE ANNUAL WELLNESS VISIT, SUBSEQUENT: ICD-10-CM

## 2024-06-05 DIAGNOSIS — E11.610 CHARCOT FOOT DUE TO DIABETES MELLITUS (HCC): ICD-10-CM

## 2024-06-05 PROCEDURE — G0439 PPPS, SUBSEQ VISIT: HCPCS | Performed by: FAMILY MEDICINE

## 2024-06-05 PROCEDURE — 99214 OFFICE O/P EST MOD 30 MIN: CPT | Performed by: FAMILY MEDICINE

## 2024-06-05 RX ORDER — FLUTICASONE PROPIONATE AND SALMETEROL 250; 50 UG/1; UG/1
1 POWDER RESPIRATORY (INHALATION) 2 TIMES DAILY
Qty: 60 BLISTER | Refills: 2 | Status: SHIPPED | OUTPATIENT
Start: 2024-06-05

## 2024-06-05 RX ORDER — FLUTICASONE PROPIONATE AND SALMETEROL 250; 50 UG/1; UG/1
1 POWDER RESPIRATORY (INHALATION) 2 TIMES DAILY
Qty: 60 BLISTER | Refills: 2 | Status: SHIPPED | OUTPATIENT
Start: 2024-06-05 | End: 2024-06-05 | Stop reason: SDUPTHER

## 2024-06-05 RX ORDER — PANTOPRAZOLE SODIUM 40 MG/1
40 TABLET, DELAYED RELEASE ORAL DAILY
Qty: 90 TABLET | Refills: 3 | Status: SHIPPED | OUTPATIENT
Start: 2024-06-05

## 2024-06-05 RX ORDER — AMLODIPINE BESYLATE 2.5 MG/1
2.5 TABLET ORAL DAILY
Qty: 90 TABLET | Refills: 1 | Status: SHIPPED | OUTPATIENT
Start: 2024-06-05

## 2024-06-05 RX ORDER — FUROSEMIDE 20 MG/1
20 TABLET ORAL DAILY
Qty: 90 TABLET | Refills: 3 | Status: SHIPPED | OUTPATIENT
Start: 2024-06-05

## 2024-06-05 NOTE — PROGRESS NOTES
Ambulatory Visit  Name: Cam Wilkins      : 1950      MRN: [id number]  Encounter Provider: Tremayne Lewis MD  Encounter Date: 2024   Encounter department: [facility]    Assessment & Plan   1. Coronary artery disease involving native coronary artery without angina pectoris, unspecified whether native or transplanted heart  -     furosemide (LASIX) 20 mg tablet; Take 1 tablet (20 mg total) by mouth daily  -     amLODIPine (NORVASC) 2.5 mg tablet; Take 1 tablet (2.5 mg total) by mouth daily  -     Lipid panel; Future  2. Medicare annual wellness visit, subsequent  3. Charcot foot due to diabetes mellitus (HCC)  4. Type 2 diabetes mellitus without complication, without long-term current use of insulin (HCC)  Assessment & Plan:  Recehck A1c, seeing opthalmology and podiatry, continue mounjaro at 5m, has lost about 20lbs on this medication.    Lab Results   Component Value Date    HGBA1C 6.9 (H) 2024     Orders:  -     Albumin / creatinine urine ratio; Future; Expected date: 2024  -     Comprehensive metabolic panel; Future; Expected date: 2024  -     Hemoglobin A1C; Future; Expected date: 2024  -     Lipid Panel with Direct LDL reflex; Future; Expected date: 2024  -     metFORMIN (GLUCOPHAGE) 500 mg tablet; Take 2 in AM one In PM  -     tirzepatide (Mounjaro) 5 MG/0.5ML; Inject 0.5 mL (5 mg total) under the skin once a week For diabetes  5. Mild intermittent asthma without complication  -     Fluticasone-Salmeterol (Advair Diskus) 250-50 mcg/dose inhaler; Inhale 1 puff 2 (two) times a day  6. Gastroesophageal reflux disease, unspecified whether esophagitis present  -     pantoprazole (PROTONIX) 40 mg tablet; Take 1 tablet (40 mg total) by mouth daily  7. Status post insertion of drug eluting coronary artery stent  Assessment & Plan:  Gabriel on current medications, follows up with cardiology as well.      Depression Screening and  Follow-up Plan: Patient was screened for depression during today's encounter. They screened negative with a PHQ-2 score of 0.      Preventive health issues were discussed with patient, and age appropriate screening tests were ordered as noted in patient's After Visit Summary. Personalized health advice and appropriate referrals for health education or preventive services given if needed, as noted in patient's After Visit Summary.    History of Present Illness     HPI     Doing well stable on current medication, large reduction in diet with mounjaro.    Feeling well, working with .    No concerns today.    Following with cardiology, unable to do cardiac rehab due to brusing and orthopedic issues.    Patient Care Team:  Tremayne Lewis MD as PCP - General (Family Medicine)    Review of Systems   Constitutional:  Negative for activity change and appetite change.   Respiratory:  Negative for apnea and chest tightness.    Cardiovascular:  Negative for chest pain and palpitations.   Gastrointestinal:  Negative for abdominal pain.   Musculoskeletal:  Negative for arthralgias and back pain.   Psychiatric/Behavioral:  Negative for agitation and behavioral problems.      Medical History Reviewed by provider this encounter:  Tobacco  Allergies  Meds  Problems  Med Hx  Surg Hx  Fam Hx       Annual Wellness Visit Questionnaire       Health Risk Assessment:   Patient rates overall health as good. Patient feels that their physical health rating is same. Patient is satisfied with their life. Eyesight was rated as same. Hearing was rated as slightly worse. Patient feels that their emotional and mental health rating is same. Patients states they are never, rarely angry. Patient states they are sometimes unusually tired/fatigued. Pain experienced in the last 7 days has been some. Patient's pain rating has been 4/10. Patient states that he has experienced no weight loss or gain in last 6 months.      Depression Screening:   PHQ-2 Score: 0      Fall Risk Screening:   In the past year, patient has experienced: no history of falling in past year      Home Safety:  Patient has trouble with stairs inside or outside of their home. Patient has working smoke alarms and has working carbon monoxide detector. Home safety hazards include: none.     Nutrition:   Current diet is Regular.     Medications:   Patient is currently taking over-the-counter supplements. OTC medications include: see medication list. Patient is able to manage medications.     Activities of Daily Living (ADLs)/Instrumental Activities of Daily Living (IADLs):   Walk and transfer into and out of bed and chair?: Yes  Dress and groom yourself?: Yes    Bathe or shower yourself?: Yes    Feed yourself? Yes  Do your laundry/housekeeping?: Yes  Manage your money, pay your bills and track your expenses?: Yes  Make your own meals?: Yes    Do your own shopping?: Yes    Previous Hospitalizations:   Any hospitalizations or ED visits within the last 12 months?: Yes    How many hospitalizations have you had in the last year?: 1-2    Hospitalization Comments: I had three stents put in at [facility]    Advance Care Planning:   Living will: Yes    Durable POA for healthcare: Yes    Advanced directive: Yes      PREVENTIVE SCREENINGS      Cardiovascular Screening:    General: Screening Not Indicated and History Lipid Disorder      Diabetes Screening:     General: Screening Not Indicated and History Diabetes      Prostate Cancer Screening:    General: Screening Current      Osteoporosis Screening:    General: Screening Current      Abdominal Aortic Aneurysm (AAA) Screening:        General: Screening Not Indicated      Lung Cancer Screening:     General: Screening Not Indicated      Hepatitis C Screening:    General: Screening Current    Screening, Brief Intervention, and Referral to Treatment (SBIRT)    Screening  Typical number of drinks in a day: 0  Typical number of  drinks in a week: 3  Interpretation: Low risk drinking behavior.    AUDIT-C Screenin) How often did you have a drink containing alcohol in the past year? 2 to 4 times a month  2) How many drinks did you have on a typical day when you were drinking in the past year? 0  3) How often did you have 6 or more drinks on one occasion in the past year? never    AUDIT-C Score: 2  Interpretation: Score 0-3 (male): Negative screen for alcohol misuse    Single Item Drug Screening:  How often have you used an illegal drug (including marijuana) or a prescription medication for non-medical reasons in the past year? never    Single Item Drug Screen Score: 0  Interpretation: Negative screen for possible drug use disorder    Social Determinants of Health     Food Insecurity: Patient Declined (2024)    Hunger Vital Sign     Worried About Running Out of Food in the Last Year: Patient declined     Ran Out of Food in the Last Year: Patient declined   Transportation Needs: Patient Declined (2024)    PRAPARE - Transportation     Lack of Transportation (Medical): Patient declined     Lack of Transportation (Non-Medical): Patient declined   Housing Stability: Patient Declined (2024)    Housing Stability Vital Sign     Unable to Pay for Housing in the Last Year: Patient declined     Number of Times Moved in the Last Year: 0     Homeless in the Last Year: Patient declined   Utilities: Patient Declined (2024)    [facility] Utilities     Threatened with loss of utilities: Patient declined     No results found.    Objective     /68 (BP Location: Left arm, Patient Position: Sitting, Cuff Size: Large)   Pulse 78   Temp (!) 96.7 °F (35.9 °C) (Tympanic)   Resp 20   Ht 6' (1.829 m)   Wt 108 kg (237 lb 12.8 oz)   SpO2 97%   BMI 32.25 kg/m²     Physical Exam  Constitutional:       Appearance: Normal appearance.   Cardiovascular:      Rate and Rhythm: Normal rate and regular rhythm.      Pulses: Normal pulses.      Heart  sounds: Normal heart sounds.   Pulmonary:      Effort: Pulmonary effort is normal.      Breath sounds: Normal breath sounds.   Abdominal:      General: Abdomen is flat.   Skin:     General: Skin is warm.      Capillary Refill: Capillary refill takes less than 2 seconds.   Neurological:      General: No focal deficit present.      Mental Status: He is alert.       Administrative Statements

## 2024-06-05 NOTE — ASSESSMENT & PLAN NOTE
Recehck A1c, seeing opthalmology and podiatry, continue mounjaro at 5m, has lost about 20lbs on this medication.    Lab Results   Component Value Date    HGBA1C 6.9 (H) 02/27/2024

## 2024-06-06 ENCOUNTER — APPOINTMENT (OUTPATIENT)
Dept: LAB | Facility: MEDICAL CENTER | Age: 74
End: 2024-06-06
Payer: MEDICARE

## 2024-06-06 DIAGNOSIS — E11.9 TYPE 2 DIABETES MELLITUS WITHOUT COMPLICATION, WITHOUT LONG-TERM CURRENT USE OF INSULIN (HCC): ICD-10-CM

## 2024-06-06 DIAGNOSIS — I25.10 CORONARY ARTERY DISEASE INVOLVING NATIVE CORONARY ARTERY WITHOUT ANGINA PECTORIS, UNSPECIFIED WHETHER NATIVE OR TRANSPLANTED HEART: ICD-10-CM

## 2024-06-06 LAB
ALBUMIN SERPL BCP-MCNC: 4.3 G/DL (ref 3.5–5)
ALP SERPL-CCNC: 77 U/L (ref 34–104)
ALT SERPL W P-5'-P-CCNC: 19 U/L (ref 7–52)
ANION GAP SERPL CALCULATED.3IONS-SCNC: 11 MMOL/L (ref 4–13)
AST SERPL W P-5'-P-CCNC: 18 U/L (ref 13–39)
BILIRUB SERPL-MCNC: 0.67 MG/DL (ref 0.2–1)
BUN SERPL-MCNC: 16 MG/DL (ref 5–25)
CALCIUM SERPL-MCNC: 9 MG/DL (ref 8.4–10.2)
CHLORIDE SERPL-SCNC: 106 MMOL/L (ref 96–108)
CHOLEST SERPL-MCNC: 97 MG/DL
CO2 SERPL-SCNC: 25 MMOL/L (ref 21–32)
CREAT SERPL-MCNC: 0.92 MG/DL (ref 0.6–1.3)
CREAT UR-MCNC: 221.8 MG/DL
EST. AVERAGE GLUCOSE BLD GHB EST-MCNC: 120 MG/DL
GFR SERPL CREATININE-BSD FRML MDRD: 81 ML/MIN/1.73SQ M
GLUCOSE P FAST SERPL-MCNC: 117 MG/DL (ref 65–99)
HBA1C MFR BLD: 5.8 %
HDLC SERPL-MCNC: 31 MG/DL
LDLC SERPL CALC-MCNC: 39 MG/DL (ref 0–100)
MICROALBUMIN UR-MCNC: 24 MG/L
MICROALBUMIN/CREAT 24H UR: 11 MG/G CREATININE (ref 0–30)
POTASSIUM SERPL-SCNC: 4.1 MMOL/L (ref 3.5–5.3)
PROT SERPL-MCNC: 6.6 G/DL (ref 6.4–8.4)
SODIUM SERPL-SCNC: 142 MMOL/L (ref 135–147)
TRIGL SERPL-MCNC: 136 MG/DL

## 2024-06-06 PROCEDURE — 82043 UR ALBUMIN QUANTITATIVE: CPT

## 2024-06-06 PROCEDURE — 83036 HEMOGLOBIN GLYCOSYLATED A1C: CPT

## 2024-06-06 PROCEDURE — 82570 ASSAY OF URINE CREATININE: CPT

## 2024-06-06 PROCEDURE — 80061 LIPID PANEL: CPT

## 2024-06-06 PROCEDURE — 80053 COMPREHEN METABOLIC PANEL: CPT

## 2024-06-06 PROCEDURE — 36415 COLL VENOUS BLD VENIPUNCTURE: CPT

## 2024-06-25 ENCOUNTER — DOCUMENTATION (OUTPATIENT)
Dept: FAMILY MEDICINE CLINIC | Facility: CLINIC | Age: 74
End: 2024-06-25

## 2024-06-25 ENCOUNTER — TELEPHONE (OUTPATIENT)
Dept: FAMILY MEDICINE CLINIC | Facility: CLINIC | Age: 74
End: 2024-06-25

## 2024-06-25 NOTE — PROGRESS NOTES
At previous appointment script sent for 5mg, will finish out 5mg and increase to 7.5mg after this, 10mg in August if tolerating well.

## 2024-06-28 ENCOUNTER — TELEPHONE (OUTPATIENT)
Age: 74
End: 2024-06-28

## 2024-06-28 DIAGNOSIS — E11.9 TYPE 2 DIABETES MELLITUS WITHOUT COMPLICATION, WITHOUT LONG-TERM CURRENT USE OF INSULIN (HCC): Primary | ICD-10-CM

## 2024-06-28 NOTE — TELEPHONE ENCOUNTER
"Patient's wife called stating that patient's needs a prescription sent into their [facility] for the 7.5 mg Mounjaro dose.  Patient's instructions were \"will finish out 5mg and increase to 7.5mg after this, 10mg in August if tolerating well. \"    An Rx for 10 mg was sent to the pharmacy for August but patient needs an Rx sent in for the 7.5 mg dose as well. He only has 3 doses of the 5 mg at home.  "

## 2024-07-18 ENCOUNTER — HOSPITAL ENCOUNTER (INPATIENT)
Facility: HOSPITAL | Age: 74
LOS: 1 days | Discharge: HOME | DRG: 309 | End: 2024-07-19
Attending: EMERGENCY MEDICINE | Admitting: INTERNAL MEDICINE
Payer: MEDICARE

## 2024-07-18 DIAGNOSIS — I48.0 PAROXYSMAL ATRIAL FIBRILLATION (HCC): ICD-10-CM

## 2024-07-18 DIAGNOSIS — K21.9 GASTROESOPHAGEAL REFLUX DISEASE, UNSPECIFIED WHETHER ESOPHAGITIS PRESENT: ICD-10-CM

## 2024-07-18 DIAGNOSIS — I49.8 BIGEMINY: Primary | ICD-10-CM

## 2024-07-18 DIAGNOSIS — E83.42 HYPOMAGNESEMIA: ICD-10-CM

## 2024-07-18 DIAGNOSIS — R00.1 BRADYCARDIA: ICD-10-CM

## 2024-07-18 LAB
ALBUMIN SERPL BCG-MCNC: 4.4 G/DL (ref 3.5–5)
ALP SERPL-CCNC: 81 U/L (ref 34–104)
ALT SERPL W P-5'-P-CCNC: 20 U/L (ref 7–52)
ANION GAP SERPL CALCULATED.3IONS-SCNC: 6 MMOL/L (ref 4–13)
AST SERPL W P-5'-P-CCNC: 16 U/L (ref 13–39)
BILIRUB SERPL-MCNC: 0.51 MG/DL (ref 0.2–1)
BUN SERPL-MCNC: 22 MG/DL (ref 5–25)
CALCIUM SERPL-MCNC: 8.9 MG/DL (ref 8.4–10.2)
CARDIAC TROPONIN I PNL SERPL HS: 30 NG/L
CHLORIDE SERPL-SCNC: 106 MMOL/L (ref 96–108)
CO2 SERPL-SCNC: 27 MMOL/L (ref 21–32)
CREAT SERPL-MCNC: 1.07 MG/DL (ref 0.6–1.3)
ERYTHROCYTE [DISTWIDTH] IN BLOOD BY AUTOMATED COUNT: 13.9 % (ref 11.6–15.1)
GFR SERPL CREATININE-BSD FRML MDRD: 68 ML/MIN/1.73SQ M
GLUCOSE SERPL-MCNC: 83 MG/DL (ref 65–140)
HCT VFR BLD AUTO: 37.9 % (ref 36.5–49.3)
HGB BLD-MCNC: 12.7 G/DL (ref 12–17)
MAGNESIUM SERPL-MCNC: 1.4 MG/DL (ref 1.9–2.7)
MCH RBC QN AUTO: 29.3 PG (ref 26.8–34.3)
MCHC RBC AUTO-ENTMCNC: 33.5 G/DL (ref 31.4–37.4)
MCV RBC AUTO: 88 FL (ref 82–98)
PLATELET # BLD AUTO: 248 THOUSANDS/UL (ref 149–390)
PMV BLD AUTO: 10.9 FL (ref 8.9–12.7)
POTASSIUM SERPL-SCNC: 3.6 MMOL/L (ref 3.5–5.3)
PROT SERPL-MCNC: 6.6 G/DL (ref 6.4–8.4)
RBC # BLD AUTO: 4.33 MILLION/UL (ref 3.88–5.62)
SODIUM SERPL-SCNC: 139 MMOL/L (ref 135–147)
TSH SERPL DL<=0.05 MIU/L-ACNC: 6.01 UIU/ML (ref 0.45–4.5)
WBC # BLD AUTO: 25.12 THOUSAND/UL (ref 4.31–10.16)

## 2024-07-18 PROCEDURE — 96365 THER/PROPH/DIAG IV INF INIT: CPT

## 2024-07-18 PROCEDURE — 85027 COMPLETE CBC AUTOMATED: CPT | Performed by: EMERGENCY MEDICINE

## 2024-07-18 PROCEDURE — 84439 ASSAY OF FREE THYROXINE: CPT | Performed by: EMERGENCY MEDICINE

## 2024-07-18 PROCEDURE — 99285 EMERGENCY DEPT VISIT HI MDM: CPT

## 2024-07-18 PROCEDURE — 85007 BL SMEAR W/DIFF WBC COUNT: CPT | Performed by: EMERGENCY MEDICINE

## 2024-07-18 PROCEDURE — 36415 COLL VENOUS BLD VENIPUNCTURE: CPT | Performed by: EMERGENCY MEDICINE

## 2024-07-18 PROCEDURE — 93005 ELECTROCARDIOGRAM TRACING: CPT

## 2024-07-18 PROCEDURE — 99285 EMERGENCY DEPT VISIT HI MDM: CPT | Performed by: EMERGENCY MEDICINE

## 2024-07-18 PROCEDURE — 84443 ASSAY THYROID STIM HORMONE: CPT | Performed by: EMERGENCY MEDICINE

## 2024-07-18 PROCEDURE — 84484 ASSAY OF TROPONIN QUANT: CPT | Performed by: EMERGENCY MEDICINE

## 2024-07-18 PROCEDURE — 83735 ASSAY OF MAGNESIUM: CPT | Performed by: EMERGENCY MEDICINE

## 2024-07-18 PROCEDURE — 80053 COMPREHEN METABOLIC PANEL: CPT | Performed by: EMERGENCY MEDICINE

## 2024-07-18 RX ORDER — MAGNESIUM SULFATE HEPTAHYDRATE 40 MG/ML
2 INJECTION, SOLUTION INTRAVENOUS ONCE
Status: COMPLETED | OUTPATIENT
Start: 2024-07-18 | End: 2024-07-19

## 2024-07-18 RX ADMIN — MAGNESIUM SULFATE HEPTAHYDRATE 2 G: 40 INJECTION, SOLUTION INTRAVENOUS at 23:34

## 2024-07-19 ENCOUNTER — APPOINTMENT (INPATIENT)
Dept: NON INVASIVE DIAGNOSTICS | Facility: HOSPITAL | Age: 74
DRG: 309 | End: 2024-07-19
Payer: MEDICARE

## 2024-07-19 ENCOUNTER — TELEPHONE (OUTPATIENT)
Age: 74
End: 2024-07-19

## 2024-07-19 VITALS
OXYGEN SATURATION: 97 % | RESPIRATION RATE: 15 BRPM | HEART RATE: 69 BPM | WEIGHT: 236 LBS | SYSTOLIC BLOOD PRESSURE: 139 MMHG | BODY MASS INDEX: 31.97 KG/M2 | DIASTOLIC BLOOD PRESSURE: 73 MMHG | HEIGHT: 72 IN | TEMPERATURE: 97.3 F

## 2024-07-19 DIAGNOSIS — R00.1 BRADYCARDIA: Primary | ICD-10-CM

## 2024-07-19 PROBLEM — E83.42 HYPOMAGNESEMIA: Status: ACTIVE | Noted: 2024-07-19

## 2024-07-19 LAB
2HR DELTA HS TROPONIN: -3 NG/L
4HR DELTA HS TROPONIN: -10 NG/L
ANION GAP SERPL CALCULATED.3IONS-SCNC: 9 MMOL/L (ref 4–13)
AORTIC VALVE MEAN VELOCITY: 9 M/S
AORTIC VALVE MEAN VELOCITY: 9 M/S
APICAL FOUR CHAMBER EJECTION FRACTION: 56 %
APICAL FOUR CHAMBER EJECTION FRACTION: 56 %
APTT PPP: 31 SECONDS (ref 23–37)
ATRIAL RATE: 63 BPM
ATRIAL RATE: 69 BPM
ATRIAL RATE: 72 BPM
AV LVOT MEAN GRADIENT: 1 MMHG
AV LVOT MEAN GRADIENT: 1 MMHG
AV LVOT PEAK GRADIENT: 3 MMHG
AV LVOT PEAK GRADIENT: 3 MMHG
AV MEAN GRADIENT: 4 MMHG
AV MEAN GRADIENT: 4 MMHG
AV PEAK GRADIENT: 5 MMHG
AV PEAK GRADIENT: 5 MMHG
AV VELOCITY RATIO: 0.73
BASOPHILS # BLD MANUAL: 0 THOUSAND/UL (ref 0–0.1)
BASOPHILS NFR MAR MANUAL: 0 % (ref 0–1)
BSA FOR ECHO PROCEDURE: 2.29 M2
BSA FOR ECHO PROCEDURE: 2.29 M2
BUN SERPL-MCNC: 20 MG/DL (ref 5–25)
CALCIUM SERPL-MCNC: 8.6 MG/DL (ref 8.4–10.2)
CARDIAC TROPONIN I PNL SERPL HS: 20 NG/L
CARDIAC TROPONIN I PNL SERPL HS: 27 NG/L
CHLORIDE SERPL-SCNC: 108 MMOL/L (ref 96–108)
CO2 SERPL-SCNC: 23 MMOL/L (ref 21–32)
CREAT SERPL-MCNC: 0.94 MG/DL (ref 0.6–1.3)
DIFFERENTIAL COMMENT: ABNORMAL
DOP CALC AO PEAK VEL: 1.13 M/S
DOP CALC AO PEAK VEL: 1.13 M/S
DOP CALC AO VTI: 29.67 CM
DOP CALC AO VTI: 29.67 CM
DOP CALC LVOT PEAK VEL VTI: 19.64 CM
DOP CALC LVOT PEAK VEL VTI: 19.64 CM
DOP CALC LVOT PEAK VEL: 0.83 M/S
DOP CALC LVOT PEAK VEL: 0.83 M/S
E WAVE DECELERATION TIME: 278 MS
E WAVE DECELERATION TIME: 278 MS
E/A RATIO: 1.25
E/A RATIO: 1.25
EOSINOPHIL # BLD MANUAL: 0 THOUSAND/UL (ref 0–0.4)
EOSINOPHIL NFR BLD MANUAL: 0 % (ref 0–6)
ERYTHROCYTE [DISTWIDTH] IN BLOOD BY AUTOMATED COUNT: 13.9 % (ref 11.6–15.1)
GFR SERPL CREATININE-BSD FRML MDRD: 79 ML/MIN/1.73SQ M
GLUCOSE SERPL-MCNC: 128 MG/DL (ref 65–140)
GLUCOSE SERPL-MCNC: 135 MG/DL (ref 65–140)
GLUCOSE SERPL-MCNC: 85 MG/DL (ref 65–140)
HCT VFR BLD AUTO: 37.2 % (ref 36.5–49.3)
HGB BLD-MCNC: 12.6 G/DL (ref 12–17)
INR PPP: 1.34 (ref 0.84–1.19)
LEFT VENTRICLE DIASTOLIC VOLUME (MOD BIPLANE): 110 ML
LEFT VENTRICLE DIASTOLIC VOLUME (MOD BIPLANE): 110 ML
LEFT VENTRICLE DIASTOLIC VOLUME INDEX (MOD BIPLANE): 48 ML/M2
LEFT VENTRICLE SYSTOLIC VOLUME (MOD BIPLANE): 43 ML
LEFT VENTRICLE SYSTOLIC VOLUME (MOD BIPLANE): 43 ML
LEFT VENTRICLE SYSTOLIC VOLUME INDEX (MOD BIPLANE): 18.8 ML/M2
LG PLATELETS BLD QL SMEAR: PRESENT
LV EF: 61 %
LV EF: 61 %
LYMPHOCYTES # BLD AUTO: 18.84 THOUSAND/UL (ref 0.6–4.47)
LYMPHOCYTES # BLD AUTO: 75 % (ref 14–44)
MAGNESIUM SERPL-MCNC: 1.9 MG/DL (ref 1.9–2.7)
MCH RBC QN AUTO: 29.8 PG (ref 26.8–34.3)
MCHC RBC AUTO-ENTMCNC: 33.9 G/DL (ref 31.4–37.4)
MCV RBC AUTO: 88 FL (ref 82–98)
MONOCYTES # BLD AUTO: 0.5 THOUSAND/UL (ref 0–1.22)
MONOCYTES NFR BLD: 2 % (ref 4–12)
MV E'TISSUE VEL-LAT: 9 CM/S
MV E'TISSUE VEL-LAT: 9 CM/S
MV E'TISSUE VEL-SEP: 7 CM/S
MV E'TISSUE VEL-SEP: 7 CM/S
MV PEAK A VEL: 0.59 M/S
MV PEAK A VEL: 0.59 M/S
MV PEAK E VEL: 74 CM/S
MV PEAK E VEL: 74 CM/S
MV STENOSIS PRESSURE HALF TIME: 80 MS
MV STENOSIS PRESSURE HALF TIME: 80 MS
MV VALVE AREA P 1/2 METHOD: 2.75
MV VALVE AREA P 1/2 METHOD: 2.8
NEUTROPHILS # BLD MANUAL: 5.78 THOUSAND/UL (ref 1.85–7.62)
NEUTS SEG NFR BLD AUTO: 23 % (ref 43–75)
OVALOCYTES BLD QL SMEAR: PRESENT
P AXIS: 44 DEGREES
P AXIS: 74 DEGREES
P AXIS: 85 DEGREES
PATHOLOGY REVIEW: NO
PLATELET # BLD AUTO: 190 THOUSANDS/UL (ref 149–390)
PLATELET BLD QL SMEAR: ADEQUATE
PMV BLD AUTO: 11 FL (ref 8.9–12.7)
POTASSIUM SERPL-SCNC: 4.2 MMOL/L (ref 3.5–5.3)
PR INTERVAL: 154 MS
PR INTERVAL: 154 MS
PR INTERVAL: 164 MS
PROTHROMBIN TIME: 16.8 SECONDS (ref 11.6–14.5)
QRS AXIS: 43 DEGREES
QRS AXIS: 43 DEGREES
QRS AXIS: 44 DEGREES
QRSD INTERVAL: 88 MS
QRSD INTERVAL: 90 MS
QRSD INTERVAL: 92 MS
QT INTERVAL: 430 MS
QT INTERVAL: 448 MS
QT INTERVAL: 452 MS
QTC INTERVAL: 458 MS
QTC INTERVAL: 460 MS
QTC INTERVAL: 494 MS
RA PRESSURE ESTIMATED: 3 MMHG
RBC # BLD AUTO: 4.23 MILLION/UL (ref 3.88–5.62)
RBC MORPH BLD: PRESENT
SL CV LV EF: 55
SODIUM SERPL-SCNC: 140 MMOL/L (ref 135–147)
T WAVE AXIS: -6 DEGREES
T WAVE AXIS: 126 DEGREES
T WAVE AXIS: 25 DEGREES
T4 FREE SERPL-MCNC: 0.74 NG/DL (ref 0.61–1.12)
TRICUSPID ANNULAR PLANE SYSTOLIC EXCURSION: 2.5 CM
TRICUSPID ANNULAR PLANE SYSTOLIC EXCURSION: 2.5 CM
VENTRICULAR RATE: 63 BPM
VENTRICULAR RATE: 69 BPM
VENTRICULAR RATE: 72 BPM
WBC # BLD AUTO: 20.02 THOUSAND/UL (ref 4.31–10.16)

## 2024-07-19 PROCEDURE — 93325 DOPPLER ECHO COLOR FLOW MAPG: CPT

## 2024-07-19 PROCEDURE — 99222 1ST HOSP IP/OBS MODERATE 55: CPT

## 2024-07-19 PROCEDURE — 84484 ASSAY OF TROPONIN QUANT: CPT | Performed by: EMERGENCY MEDICINE

## 2024-07-19 PROCEDURE — 93308 TTE F-UP OR LMTD: CPT

## 2024-07-19 PROCEDURE — 36415 COLL VENOUS BLD VENIPUNCTURE: CPT | Performed by: EMERGENCY MEDICINE

## 2024-07-19 PROCEDURE — 93321 DOPPLER ECHO F-UP/LMTD STD: CPT

## 2024-07-19 PROCEDURE — 93010 ELECTROCARDIOGRAM REPORT: CPT | Performed by: STUDENT IN AN ORGANIZED HEALTH CARE EDUCATION/TRAINING PROGRAM

## 2024-07-19 PROCEDURE — 82948 REAGENT STRIP/BLOOD GLUCOSE: CPT

## 2024-07-19 PROCEDURE — 85610 PROTHROMBIN TIME: CPT | Performed by: INTERNAL MEDICINE

## 2024-07-19 PROCEDURE — 99223 1ST HOSP IP/OBS HIGH 75: CPT | Performed by: INTERNAL MEDICINE

## 2024-07-19 PROCEDURE — 83735 ASSAY OF MAGNESIUM: CPT | Performed by: INTERNAL MEDICINE

## 2024-07-19 PROCEDURE — 93005 ELECTROCARDIOGRAM TRACING: CPT

## 2024-07-19 PROCEDURE — 80048 BASIC METABOLIC PNL TOTAL CA: CPT | Performed by: INTERNAL MEDICINE

## 2024-07-19 PROCEDURE — 85730 THROMBOPLASTIN TIME PARTIAL: CPT | Performed by: INTERNAL MEDICINE

## 2024-07-19 PROCEDURE — 99239 HOSP IP/OBS DSCHRG MGMT >30: CPT | Performed by: PHYSICIAN ASSISTANT

## 2024-07-19 PROCEDURE — 85027 COMPLETE CBC AUTOMATED: CPT | Performed by: INTERNAL MEDICINE

## 2024-07-19 RX ORDER — IPRATROPIUM BROMIDE AND ALBUTEROL SULFATE 2.5; .5 MG/3ML; MG/3ML
3 SOLUTION RESPIRATORY (INHALATION) EVERY 6 HOURS PRN
Status: DISCONTINUED | OUTPATIENT
Start: 2024-07-19 | End: 2024-07-19 | Stop reason: HOSPADM

## 2024-07-19 RX ORDER — INSULIN LISPRO 100 [IU]/ML
1-5 INJECTION, SOLUTION INTRAVENOUS; SUBCUTANEOUS
Status: DISCONTINUED | OUTPATIENT
Start: 2024-07-19 | End: 2024-07-19 | Stop reason: HOSPADM

## 2024-07-19 RX ORDER — FAMOTIDINE 20 MG/1
20 TABLET, FILM COATED ORAL
Qty: 30 TABLET | Refills: 0 | Status: SHIPPED | OUTPATIENT
Start: 2024-07-19

## 2024-07-19 RX ORDER — CLOPIDOGREL BISULFATE 75 MG/1
75 TABLET ORAL DAILY
Status: DISCONTINUED | OUTPATIENT
Start: 2024-07-19 | End: 2024-07-19 | Stop reason: HOSPADM

## 2024-07-19 RX ORDER — ACETAMINOPHEN 325 MG/1
650 TABLET ORAL EVERY 6 HOURS PRN
Status: DISCONTINUED | OUTPATIENT
Start: 2024-07-19 | End: 2024-07-19 | Stop reason: HOSPADM

## 2024-07-19 RX ORDER — FAMOTIDINE 20 MG/1
20 TABLET, FILM COATED ORAL
Status: DISCONTINUED | OUTPATIENT
Start: 2024-07-19 | End: 2024-07-19 | Stop reason: HOSPADM

## 2024-07-19 RX ORDER — ATORVASTATIN CALCIUM 80 MG/1
80 TABLET, FILM COATED ORAL
Status: DISCONTINUED | OUTPATIENT
Start: 2024-07-19 | End: 2024-07-19

## 2024-07-19 RX ORDER — PANTOPRAZOLE SODIUM 40 MG/1
40 TABLET, DELAYED RELEASE ORAL
Status: DISCONTINUED | OUTPATIENT
Start: 2024-07-19 | End: 2024-07-19

## 2024-07-19 RX ORDER — POTASSIUM CHLORIDE 20 MEQ/1
20 TABLET, EXTENDED RELEASE ORAL ONCE
Status: COMPLETED | OUTPATIENT
Start: 2024-07-19 | End: 2024-07-19

## 2024-07-19 RX ORDER — FLUTICASONE FUROATE AND VILANTEROL 200; 25 UG/1; UG/1
1 POWDER RESPIRATORY (INHALATION) DAILY
Status: DISCONTINUED | OUTPATIENT
Start: 2024-07-19 | End: 2024-07-19 | Stop reason: HOSPADM

## 2024-07-19 RX ORDER — METOPROLOL SUCCINATE 50 MG/1
50 TABLET, EXTENDED RELEASE ORAL DAILY
Status: DISCONTINUED | OUTPATIENT
Start: 2024-07-19 | End: 2024-07-19 | Stop reason: HOSPADM

## 2024-07-19 RX ORDER — ATORVASTATIN CALCIUM 80 MG/1
80 TABLET, FILM COATED ORAL
Status: DISCONTINUED | OUTPATIENT
Start: 2024-07-19 | End: 2024-07-19 | Stop reason: HOSPADM

## 2024-07-19 RX ADMIN — FLUTICASONE FUROATE AND VILANTEROL TRIFENATATE 1 PUFF: 200; 25 POWDER RESPIRATORY (INHALATION) at 08:24

## 2024-07-19 RX ADMIN — METOPROLOL SUCCINATE 50 MG: 50 TABLET, EXTENDED RELEASE ORAL at 11:20

## 2024-07-19 RX ADMIN — POTASSIUM CHLORIDE 20 MEQ: 1500 TABLET, EXTENDED RELEASE ORAL at 01:44

## 2024-07-19 RX ADMIN — VITAM B12 50 MCG: 100 TAB at 08:24

## 2024-07-19 RX ADMIN — CLOPIDOGREL BISULFATE 75 MG: 75 TABLET ORAL at 08:25

## 2024-07-19 RX ADMIN — PANTOPRAZOLE SODIUM 40 MG: 40 TABLET, DELAYED RELEASE ORAL at 06:51

## 2024-07-19 RX ADMIN — ATORVASTATIN CALCIUM 80 MG: 80 TABLET, FILM COATED ORAL at 01:06

## 2024-07-19 RX ADMIN — APIXABAN 5 MG: 5 TABLET, FILM COATED ORAL at 01:06

## 2024-07-19 NOTE — CONSULTS
Consult - Cardiology   Cam Wilkins 73 y.o. male MRN: [id number]  Unit/Bed#: E4 -01 Encounter: [id number]        Reason For Consult:  bradycardia per apple watch                 Assessment:  VPCs: frequent, at times bigeminy  Hypomagnesemia POA: Resolved   1.4--> 2g IV -->1.9   O/p Rx: Mag Oxide 400mg qd  Hx Paroxysmal Atrial fibrillation & PSVT -probable Atrial tachycardia    Dx 2020   Eliquis 5mg bid   AV blocking Rx: Tiprol 50mg bid  CAD   Cath 2013 (NSTEMI): DARRION - pRCA   Cath 2/27/24 (CORDERO, abnormal stress): DARRION mid-distal Cfx, IV lithotripsy/DARRION -mRCA (distal to previous stent which was patent)    Rx: DAPT (ASA + Plavix),   HTN   O/p Rx: Toprol 50mg bid, Benicar 40mg qd, Lasix 20mg qd, Amlodipine 2.5mg qd  Hyperlipidemia   Atorvastatin 80mg  Hx pericarditis, pericardial effusion - s/p pericardiocentesis 2020  Diabetes mellitus  Chronic lymphocytic leukemia    Discussion / Plan:  #  Pt admitted with concern for bradycardia suggested by apple watch - though asymptomatic.  Subsequent evidence of frequent unifocal VPCs; at times bigeminy suspecting these account for erroneous reporting of bradycardia by wearable device. Normal BP trend.  #  Apple watch also suggesting recent AF burden of approximately 10-20%. Asymptomatic with historically poor subjective recognition of dysrhythmia.    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   Ok for d/c from cardiology perspective        History  "Of Present Illness:  Cam Wilkins is 73 year old with history summarized above with the following highlights.  Came under care of our group January 2024; last seen 3/18/24  Hx pericarditis with pericardial effusion s/p drainage in 2020   Hx of CAD NSTEMI and prior PCI - last was 2/2024. Historically normal LVEF with mild valvular heart disease  Hx PAF (dx at time of pericarditis) with tendency toward low normal rates. Option of ablation discussed at last visit with patient initially disinterested --> continued on rate control and Eliquis AC.  Additional Hx of SVT - probable atrial tachycardia per event monitor 2023 (report below)  Hypertension and Hyperlipidemia typically well controlled.     Pt recently feeling at baseline and had golfed 18 holes on day of admission.  At one point alerted by Apple watch of \"HR <40bpm greater than 10 minutes\" though asymptomatic.  Later in the evening again notified of low HR.  Pt used pulse oximeter to recheck also showing HR <40. Again asymptomatic but because of recurrence he came to hospital to be evaluated.  Since arrival ECG and telemetry evidence of frequent unifocal VPC - at times bigeminy. Magnesium decreased on arrival but repleted to low normal level.          Past Medical History:        Past Medical History:   Diagnosis Date    Asthma     BPH (benign prostatic hyperplasia)     DM (diabetes mellitus), type 2 (HCC)     Hearing loss     Hypertension     Nocturia       Past Surgical History:   Procedure Laterality Date    CARDIAC CATHETERIZATION Left 2/27/2024    Procedure: Cardiac Left Heart Cath;  Surgeon: Jimbo Briggs MD;  Location: [facility];  Service: Cardiology    CARDIAC CATHETERIZATION N/A 2/27/2024    Procedure: Cardiac pci;  Surgeon: Jimbo Briggs MD;  Location: [facility];  Service: Cardiology    CORONARY ANGIOPLASTY WITH STENT PLACEMENT N/A 2013    EYE SURGERY      KNEE SURGERY      TONSILLECTOMY          Allergy:        Allergies   Allergen " Reactions    Celecoxib Palpitations    Clarithromycin     Rofecoxib Palpitations and Other (See Comments)     Heart palpations.     Other reaction(s): Erythema    All salmon inhibitors.       Medications:       Prior to Admission medications    Medication Sig Start Date End Date Taking? Authorizing Provider   amLODIPine (NORVASC) 2.5 mg tablet Take 1 tablet (2.5 mg total) by mouth daily 6/5/24   Tremayne Lewis MD   atorvastatin (LIPITOR) 80 mg tablet Take 1 tablet (80 mg total) by mouth daily 2/28/24   TIFFANI Decker   Cholecalciferol (Vitamin D3) 50 MCG (2000 UT) capsule Take 2,000 Units by mouth daily 10/2/23   Historical Provider, MD   clopidogrel (PLAVIX) 75 mg tablet Take 1 tablet (75 mg total) by mouth daily 2/28/24   TIFFANI Decker   Eliquis 5 MG Take 1 tablet (5 mg total) by mouth 2 (two) times a day 3/18/24   Shelby Matson DO   Fluticasone-Salmeterol (Advair Diskus) 250-50 mcg/dose inhaler Inhale 1 puff 2 (two) times a day 6/5/24   Tremayne Lewis MD   furosemide (LASIX) 20 mg tablet Take 1 tablet (20 mg total) by mouth daily 6/5/24   Tremayne Lewis MD   glucose blood test strip Use 1 each 3 (three) times a day    Historical Provider, MD   Ipratropium-Albuterol (COMBIVENT IN) Take 1 puff by mouth in the morning    Historical Provider, MD   ipratropium-albuterol (COMBIVENT RESPIMAT) inhaler as needed 11/20/16   Historical Provider, MD   Magnesium Oxide, Elemental, 400 MG TABS Take 400 mg by mouth 2 (two) times a day 4/5/24   Shelby Matson DO   metFORMIN (GLUCOPHAGE) 500 mg tablet Take 2 in AM one In PM 6/5/24   Tremayne Lewis MD   metoprolol succinate (TOPROL-XL) 50 mg 24 hr tablet Take 50 mg by mouth 2 (two) times a day 12/18/23   Historical Provider, MD   multivitamin-minerals (CENTRUM) tablet Take 1 tablet by mouth daily    Historical Provider, MD   olmesartan (BENICAR) 40 mg tablet olmesartan 40 mg tablet    Historical Provider, MD    pantoprazole (PROTONIX) 40 mg tablet Take 1 tablet (40 mg total) by mouth daily 6/5/24   Tremayne Lewis MD   tirzepatide (Mounjaro) 10 MG/0.5ML Inject 0.5 mL (10 mg total) under the skin every 7 days For diabetes Do not start before August 12, 2024. 8/12/24   Tremayne Lewis MD   tirzepatide (Mounjaro) 7.5 MG/0.5ML Inject 0.5 mL (7.5 mg total) under the skin every 7 days 7/1/24   Tremayne Lewis MD   vitamin B-12 (CYANOCOBALAMIN) 50 MCG TABS Place 1 tablet under the tongue daily    Historical Provider, MD   Zinc 10 MG LOZG Take by mouth 10/2/23   Historical Provider, MD       Family History:     Family History   Problem Relation Age of Onset    Cancer Mother     Heart failure Father         Social History:       Social History     Socioeconomic History    Marital status: /Civil Union     Spouse name: None    Number of children: None    Years of education: None    Highest education level: None   Occupational History    None   Tobacco Use    Smoking status: Never    Smokeless tobacco: Never   Vaping Use    Vaping status: Never Used   Substance and Sexual Activity    Alcohol use: Yes     Alcohol/week: 1.0 standard drink of alcohol     Types: 1 Cans of beer per week     Comment: social    Drug use: No    Sexual activity: None   Other Topics Concern    None   Social History Narrative    None     Social Determinants of Health     Financial Resource Strain: Not on file   Food Insecurity: Patient Declined (5/29/2024)    Hunger Vital Sign     Worried About Running Out of Food in the Last Year: Patient declined     Ran Out of Food in the Last Year: Patient declined   Transportation Needs: Patient Declined (5/29/2024)    PRAPARE - Transportation     Lack of Transportation (Medical): Patient declined     Lack of Transportation (Non-Medical): Patient declined   Physical Activity: Not on file   Stress: Not on file   Social Connections: Not on file   Intimate Partner Violence: Not on file    Housing Stability: Patient Declined (5/29/2024)    Housing Stability Vital Sign     Unable to Pay for Housing in the Last Year: Patient declined     Number of Times Moved in the Last Year: 0     Homeless in the Last Year: Patient declined       ROS:  Symptoms per HPI  The remainder of the review of systems is negative    Exam:  General:  Alert, normally conversant, comfortable appearing  Head: Normocephalic, atraumatic.  Eyes:  EOMI. Pupils - equal, round, reactive to accomodation.  No icterus.  Normal Conjunctiva.   Oropharynx: Moist without lesion  Neck:  No gross bruit, JVD, thyromegaly, or lymphadenopathy  Heart:  Regular with controlled rate.  No rub nor significant pathologic murmur  Lungs:  Clear without rales/rhonchi/wheeze  Abdomen:  Soft and nontender with normal bowel sounds. No organomegaly or mass  Lower Limbs:  No edema  Pulses:  RLE - DP:  2+                LLE - DP:  2+  Musculoskeletal: Independent movement of limbs observed, Formal ROM and strength eval not performed  Neurologic:    Oriented to: person, place, situation.     Cranial Nerves: grossly intact - vision, smell, taste, and hearing were not tested.     Motor function: grossly normal, symmetric   Sensation: Was not tested      Vitals:    07/19/24 0030 07/19/24 0100 07/19/24 0124 07/19/24 0811   BP: 140/65 152/68 161/71 143/68   BP Location: Right arm Right arm Right arm Left arm   Pulse: 66 62 63 63   Resp: 18 15 16 16   Temp:   98.1 °F (36.7 °C) 97.6 °F (36.4 °C)   TempSrc:   Temporal Temporal   SpO2: 96% 94% 98% 96%   Weight:   107 kg (236 lb 5.3 oz)    Height:   6' (1.829 m)            DATA:      -----------  ECG:                    ----------------------------------------------------------------------------------------------------------------------------------------------  Telemetry:   SR with frequent VPCs.  HR trend low 60s          -----------------------------------------------------------------------------------------------------------------------------------------------  Event monitor  3/2023  Analysis time 2 days and 10 hours.   Predominantly normal sinus rhythm at an average HR 72 bpm.  frequent runs of SVT. The fastest run was a 10 beat run of PSVT at 184 bpm.  The longest run was a 9-minute and 7-second run of PSVT at 148 minutes.  Review of tracings suggest possible atrial tachycardia.  It appears symptomatic patient event occurred within 45 seconds of noted SVT episodes.  Rare ventricular ectopy.  No significant pauses or heart block.      -----------------------------------------------------------------------------------------------------------------------------------------------  Weights:    Wt Readings from Last 20 Encounters:   07/19/24 107 kg (236 lb 5.3 oz)   06/05/24 108 kg (237 lb 12.8 oz)   04/18/24 116 kg (255 lb 6.4 oz)   03/18/24 114 kg (251 lb)   03/08/24 112 kg (248 lb)   02/27/24 112 kg (248 lb)   01/15/24 119 kg (262 lb)   11/30/23 117 kg (259 lb)   11/03/23 119 kg (263 lb)   10/28/23 119 kg (263 lb 3.7 oz)   08/15/22 122 kg (268 lb)   06/09/21 120 kg (265 lb)   06/04/20 117 kg (259 lb)   03/14/19 126 kg (278 lb 9.6 oz)   02/19/18 125 kg (275 lb)   , Body mass index is 32.05 kg/m².         Lab Studies:           Results from last 7 days   Lab Units 07/19/24  0445 07/18/24  2304   WBC Thousand/uL 20.02* 25.12*   HEMOGLOBIN g/dL 12.6 12.7   HEMATOCRIT % 37.2 37.9   PLATELETS Thousands/uL 190 248   ,   Results from last 7 days   Lab Units 07/19/24  0445 07/18/24  2304   POTASSIUM mmol/L 4.2 3.6   CHLORIDE mmol/L 108 106   CO2 mmol/L 23 27   BUN mg/dL 20 22   CREATININE mg/dL 0.94 1.07   CALCIUM mg/dL 8.6 8.9   ALK PHOS U/L  --  81   ALT U/L  --  20   AST U/L  --  16

## 2024-07-19 NOTE — ASSESSMENT & PLAN NOTE
Lab Results   Component Value Date    HGBA1C 5.8 (H) 06/06/2024       Recent Labs     07/19/24  0811 07/19/24  1131   POCGLU 128 135       Blood Sugar Average: Last 72 hrs:  (P) 131.5  Sliding scale insulin while hospitalized

## 2024-07-19 NOTE — DISCHARGE SUMMARY
Former[facility]  Discharge- Cam Wilkins 1950, 73 y.o. male MRN: [id number]  Unit/Bed#: E4 -01 Encounter: [id number]  Primary Care Provider: Tremayne Lewis MD   Date and time admitted to hospital: 7/18/2024 10:34 PM    Hypomagnesemia  Assessment & Plan  Mag 1.3 on arrival, on 400mg mag oxide BID  Suspect multifactorial:  On protonix, which may be contributing to malabsorption of mag  On Monjaro with reduced appetite, leading to decreased mag intake  On Lasix 20mg daily, possible loss of mag    Discussed with patient. Agree to stop protonix (Switch to pepcid qHS and tums prn), change lasix to PRN for weight gain (no recent edema, EF 55%), and continue mag oxide supplement BID.  Follow up with PCP    Paroxysmal atrial fibrillation (HCC)  Assessment & Plan  History of atrial fibrillation on PTA metoprolol as well as Eliquis  Continuing Eliquis and metoprolol    CLL (chronic lymphocytic leukemia) (HCC)  Assessment & Plan  Noted leukocytosis in the setting of underlying CLL  Afebrile  WBC similar to prior    Coronary artery disease involving native coronary artery without angina pectoris  Assessment & Plan  History of coronary artery disease with known two-vessel coronary artery disease status post IVUS guided IVL PCI/stent with placement of Two DARRION to the LCX artery as well as placement of DARRION to the RCA in February 2024  Continue PTA Plavix as well as statin  Last echo in March 2024 with LVEF 60%, indeterminate diastolic dysfunction.  Mild mitral valve regurgitation as well as mild pulmonary hypertension    Type 2 diabetes mellitus without complication ([facility])  Assessment & Plan  Lab Results   Component Value Date    HGBA1C 5.8 (H) 06/06/2024       Recent Labs     07/19/24  0811 07/19/24  1131   POCGLU 128 135       Blood Sugar Average: Last 72 hrs:  (P) 131.5  Sliding scale insulin while hospitalized    HTN (hypertension)  Assessment & Plan  Restart Norvasc. Lasix changed  to PRN    * Bradycardia  Assessment & Plan  Patient reports feeling slightly more fatigued today and noted on his Apple Watch that his heart rate was in the 40s; he additionally reports that he then put a pulse ox on as he has 1 at home and this also noted his heart rate in the 40s  History of atrial fibrillation on PTA Eliquis as well as metoprolol  History of coronary artery disease status post drug-eluting stents x 2 to the circumflex artery as well as drug-eluting stent placement to the RCA in February 2024  EKG sinus with frequent PVCs; telemetry in room with intermittent episodes of bigeminy  Troponin negative x 1  Noted hypomagnesemia with magnesium 1.4, repleted  K 3.6, repleted  Suspect faslely interpreted bradycardia from Apple Watch in setting of frequent ectopy with PVC and PAC. Pt asymptomatic. Evaluated by cardiology, continue BB, Zio patch outpt.        Medical Problems       Resolved Problems  Date Reviewed: 7/19/2024   None       Discharging Physician / Practitioner: Mendez Lezama PA-C  PCP: Tremayne Lewis MD  Admission Date:   Admission Orders (From admission, onward)       Ordered        07/19/24 0036  INPATIENT ADMISSION  Once                          Discharge Date: 07/19/24    Consultations During Hospital Stay:  Cardiology    Procedures Performed:   Echo - EF 55%, unable to diastolic function due to frequent ectopy. No significant valvular dz.  Telemetry    Significant Findings / Test Results:   Mag 1.4 on arrival  K+ 3.6  WBC 25      Incidental Findings:   None     Test Results Pending at Discharge (will require follow up):   None     Outpatient Tests Requested:  None    Complications:  None    Reason for Admission: Bradycardia on Apple Watch    Hospital Course:   Cam Wilkins is a 73 y.o. male patient with PMH Afib on eliquis, CAD with recent stent in Feb 2024, HTN, CLL, T2DM, asthma who originally presented to the hospital on 7/18/2024 due to low heart rate reading on  Apple watch. Asymptomatic. Admitted and monitored on telemetry overnight without any arrhytmias, NSR with frequent PVC and PAC. Remained asymptomatic. Evaluated by Cardiology, and bradycardia on apple watch was likely due to misreading from frequent PVC/PAC. He is to remain on his metoprolol and eliquis. Zio patch will be sent to his house.    Of note, he was noted to be hypomagnesemic at 1.4 on arrival despite being compliant with mag oxide BID. We will take him off protonix and change lasix to PRN for 3lbs weight gain in order to try to help maintain normal mag level. He is to follow up with PCP in 2 weeks and these can be reintroduced if needed.       Please see above list of diagnoses and related plan for additional information.     Condition at Discharge: stable    Discharge Day Visit / Exam:   Subjective:  No acute events overnight. Pt feels well.  Vitals: Blood Pressure: 139/73 (07/19/24 1058)  Pulse: 69 (07/19/24 1058)  Temperature: (!) 97.3 °F (36.3 °C) (07/19/24 1058)  Temp Source: Temporal (07/19/24 1058)  Respirations: 15 (07/19/24 1058)  Height: 6' (182.9 cm) (07/19/24 1058)  Weight - Scale: 107 kg (236 lb) (07/19/24 1058)  SpO2: 97 % (07/19/24 1058)  Exam:   Physical Exam  Vitals and nursing note reviewed.   Constitutional:       Appearance: Normal appearance.   HENT:      Head: Normocephalic and atraumatic.      Mouth/Throat:      Mouth: Mucous membranes are moist.      Pharynx: Oropharynx is clear. No oropharyngeal exudate.   Eyes:      Extraocular Movements: Extraocular movements intact.   Cardiovascular:      Rate and Rhythm: Normal rate and regular rhythm.      Pulses: Normal pulses.      Heart sounds: Normal heart sounds. No murmur heard.     No friction rub. No gallop.   Pulmonary:      Effort: Pulmonary effort is normal. No respiratory distress.      Breath sounds: Normal breath sounds. No stridor. No wheezing or rales.   Abdominal:      General: Abdomen is flat. Bowel sounds are normal. There  is no distension.      Palpations: Abdomen is soft.      Tenderness: There is no abdominal tenderness.   Musculoskeletal:      Right lower leg: No edema.      Left lower leg: No edema.   Skin:     General: Skin is warm and dry.   Neurological:      General: No focal deficit present.      Mental Status: He is alert and oriented to person, place, and time.          Discussion with Family: Updated  (wife) at bedside.    Discharge instructions/Information to patient and family:   See after visit summary for information provided to patient and family.      Provisions for Follow-Up Care:  See after visit summary for information related to follow-up care and any pertinent home health orders.      Mobility at time of Discharge:   Basic Mobility Inpatient Raw Score: 24  JH-HLM Goal: 8: Walk 250 feet or more  JH-HLM Achieved: 8: Walk 250 feet ot more  HLM Goal achieved. Continue to encourage appropriate mobility.     Disposition:   Home    Planned Readmission: None     Discharge Statement:  I spent 45 minutes discharging the patient. This time was spent on the day of discharge. I had direct contact with the patient on the day of discharge. Greater than 50% of the total time was spent examining patient, answering all patient questions, arranging and discussing plan of care with patient as well as directly providing post-discharge instructions.  Additional time then spent on discharge activities.    Discharge Medications:  See after visit summary for reconciled discharge medications provided to patient and/or family.      **Please Note: This note may have been constructed using a voice recognition system**

## 2024-07-19 NOTE — ASSESSMENT & PLAN NOTE
"Lab Results   Component Value Date    HGBA1C 5.8 (H) 06/06/2024       No results for input(s): \"POCGLU\" in the last 72 hours.    Blood Sugar Average: Last 72 hrs:    Sliding scale insulin while hospitalized  "

## 2024-07-19 NOTE — TELEPHONE ENCOUNTER
Patient's spouse Hawa contacted the office this morning wanting to notify pcp that he was currently admitted at [facility]. I did advise pcp would be notified as well. She appreciates the message being sent. Nothing else needed at this time.

## 2024-07-19 NOTE — ASSESSMENT & PLAN NOTE
Holding PTA Norvasc, Lasix and olmesartan overnight in the setting of bradycardia episode and monitoring for potential sick sinus syndrome

## 2024-07-19 NOTE — ASSESSMENT & PLAN NOTE
Patient reports feeling slightly more fatigued today and noted on his Apple Watch that his heart rate was in the 40s; he additionally reports that he then put a pulse ox on as he has 1 at home and this also noted his heart rate in the 40s  History of atrial fibrillation on PTA Eliquis as well as metoprolol  History of coronary artery disease status post drug-eluting stents x 2 to the circumflex artery as well as drug-eluting stent placement to the RCA in February 2024  EKG sinus with frequent PVCs; telemetry in room with intermittent episodes of bigeminy  Troponin negative x 1  Noted hypomagnesemia with magnesium 1.4, repleted  K 3.6, repleted  Suspect faslely interpreted bradycardia from Apple Watch in setting of frequent ectopy with PVC and PAC. Pt asymptomatic. Evaluated by cardiology, continue BB, Damiro patch outpt.

## 2024-07-19 NOTE — ASSESSMENT & PLAN NOTE
History of atrial fibrillation on PTA metoprolol as well as Eliquis  Continuing Eliquis  Holding metoprolol given patient's reported bradycardia episodes

## 2024-07-19 NOTE — ASSESSMENT & PLAN NOTE
Patient reports feeling slightly more fatigued today and noted on his Apple Watch that his heart rate was in the 40s; he additionally reports that he then put a pulse ox on as he has 1 at home and this also noted his heart rate in the 40s  History of atrial fibrillation on PTA Eliquis as well as metoprolol  History of coronary artery disease status post drug-eluting stents x 2 to the circumflex artery as well as drug-eluting stent placement to the RCA in February 2024  EKG sinus with frequent PVCs; telemetry in room with intermittent episodes of bigeminy  Troponin negative x 1  Noted hypomagnesemia with magnesium 1.4  K 3.6  Admit to medicine.  Monitor on telemetry.  Given episodes of bradycardia noted at home, in the setting of underlying A-fib, some concern for underlying SSS.  As such, holding PTA metoprolol.  We will additionally replete patient's magnesium.  Order 2D echo. Consult CV team on case for further assistance.

## 2024-07-19 NOTE — PLAN OF CARE
Problem: PAIN - ADULT  Goal: Verbalizes/displays adequate comfort level or baseline comfort level  Description: Interventions:  - Encourage patient to monitor pain and request assistance  - Assess pain using appropriate pain scale  - Administer analgesics based on type and severity of pain and evaluate response  - Implement non-pharmacological measures as appropriate and evaluate response  - Consider cultural and social influences on pain and pain management  - Notify physician/advanced practitioner if interventions unsuccessful or patient reports new pain  Outcome: Progressing     Problem: DISCHARGE PLANNING  Goal: Discharge to home or other facility with appropriate resources  Description: INTERVENTIONS:  - Identify barriers to discharge w/patient and caregiver  - Arrange for needed discharge resources and transportation as appropriate  - Identify discharge learning needs (meds, wound care, etc.)  - Refer to Case Management Department for coordinating discharge planning if the patient needs post-hospital services based on physician/advanced practitioner order or complex needs related to functional status, cognitive ability, or social support system  Outcome: Progressing     Problem: Knowledge Deficit  Goal: Patient/family/caregiver demonstrates understanding of disease process, treatment plan, medications, and discharge instructions  Description: Complete learning assessment and assess knowledge base.  Interventions:  - Provide teaching at level of understanding  - Provide teaching via preferred learning methods  Outcome: Progressing     Problem: CARDIOVASCULAR - ADULT  Goal: Maintains optimal cardiac output and hemodynamic stability  Description: INTERVENTIONS:  - Monitor I/O, vital signs and rhythm  - Monitor for S/S and trends of decreased cardiac output  - Administer and titrate ordered vasoactive medications to optimize hemodynamic stability  - Assess quality of pulses, skin color and temperature  - Assess  for signs of decreased coronary artery perfusion  - Instruct patient to report change in severity of symptoms  Outcome: Progressing  Goal: Absence of cardiac dysrhythmias or at baseline rhythm  Description: INTERVENTIONS:  - Continuous cardiac monitoring, vital signs, obtain 12 lead EKG if ordered  - Administer antiarrhythmic and heart rate control medications as ordered  - Monitor electrolytes and administer replacement therapy as ordered  Outcome: Progressing     Problem: METABOLIC, FLUID AND ELECTROLYTES - ADULT  Goal: Electrolytes maintained within normal limits  Description: INTERVENTIONS:  - Monitor labs and assess patient for signs and symptoms of electrolyte imbalances  - Administer electrolyte replacement as ordered  - Monitor response to electrolyte replacements, including repeat lab results as appropriate  - Instruct patient on fluid and nutrition as appropriate  Outcome: Progressing  Goal: Glucose maintained within target range  Description: INTERVENTIONS:  - Monitor Blood Glucose as ordered  - Assess for signs and symptoms of hyperglycemia and hypoglycemia  - Administer ordered medications to maintain glucose within target range  - Assess nutritional intake and initiate nutrition service referral as needed  Outcome: Progressing

## 2024-07-19 NOTE — ED PROVIDER NOTES
History  Chief Complaint   Patient presents with    Headache    Slow Heart Rate     Pt reports apple watch alarmed HR under 40 for 10 minutes.  Pt reports headache and dizziness     Patient is a 73-year-old male that presents from home tonight for bradycardia.  States that he had 2 episodes lasting just over 10 minutes over the past 3 hours of heart rate in the 30s to 40s.  Patient had no symptoms at that time.  States he has had this before but never for this length of time.  When patient arrived here he started feeling a little dizzy and lightheaded but during these episodes he had no symptoms.  He states he has been in his normal state of health without any recent illnesses or missing or adding new medications.  Patient denies taking extra of his metoprolol recently.  In fact, he did not take it at all tonight because of the symptoms.  Does have a history of pericarditis that resulted in a pericardial tamponade from effusion requiring a pericardiocentesis, CAD with ACS and stents, CLL and atrial fibrillation on Eliquis.          History provided by:  Patient and spouse   used: No    Headache  Associated symptoms: dizziness    Associated symptoms: no abdominal pain, no back pain, no cough, no fever, no nausea, no neck pain and no vomiting        Prior to Admission Medications   Prescriptions Last Dose Informant Patient Reported? Taking?   Cholecalciferol (Vitamin D3) 50 MCG (2000 UT) capsule  Self, Spouse/Significant Other Yes No   Sig: Take 2,000 Units by mouth daily   Eliquis 5 MG   No No   Sig: Take 1 tablet (5 mg total) by mouth 2 (two) times a day   Fluticasone-Salmeterol (Advair Diskus) 250-50 mcg/dose inhaler   No No   Sig: Inhale 1 puff 2 (two) times a day   Magnesium Oxide, Elemental, 400 MG TABS   No No   Sig: Take 400 mg by mouth 2 (two) times a day   Zinc 10 MG LOZG  Self, Spouse/Significant Other Yes No   Sig: Take by mouth   amLODIPine (NORVASC) 2.5 mg tablet   No No   Sig: Take 1  tablet (2.5 mg total) by mouth daily   atorvastatin (LIPITOR) 80 mg tablet  Spouse/Significant Other, Self No No   Sig: Take 1 tablet (80 mg total) by mouth daily   clopidogrel (PLAVIX) 75 mg tablet  Spouse/Significant Other, Self No No   Sig: Take 1 tablet (75 mg total) by mouth daily   furosemide (LASIX) 20 mg tablet   No No   Sig: Take 1 tablet (20 mg total) by mouth daily   ipratropium-albuterol (COMBIVENT RESPIMAT) inhaler  Self, Spouse/Significant Other Yes No   Sig: as needed   metFORMIN (GLUCOPHAGE) 500 mg tablet   No No   Sig: Take 2 in AM one In PM   metoprolol succinate (TOPROL-XL) 50 mg 24 hr tablet  Self, Spouse/Significant Other Yes No   Sig: Take 50 mg by mouth 2 (two) times a day   multivitamin-minerals (CENTRUM) tablet  Self, Spouse/Significant Other Yes No   Sig: Take 1 tablet by mouth daily   olmesartan (BENICAR) 40 mg tablet  Self, Spouse/Significant Other Yes No   Sig: olmesartan 40 mg tablet   pantoprazole (PROTONIX) 40 mg tablet   No No   Sig: Take 1 tablet (40 mg total) by mouth daily   tirzepatide (Mounjaro) 10 MG/0.5ML   No No   Sig: Inject 0.5 mL (10 mg total) under the skin every 7 days For diabetes Do not start before August 12, 2024.   tirzepatide (Mounjaro) 7.5 MG/0.5ML   No No   Sig: Inject 0.5 mL (7.5 mg total) under the skin every 7 days   vitamin B-12 (CYANOCOBALAMIN) 50 MCG TABS  Self, Spouse/Significant Other Yes No   Sig: Place 1 tablet under the tongue daily      Facility-Administered Medications: None       Past Medical History:   Diagnosis Date    Asthma     BPH (benign prostatic hyperplasia)     DM (diabetes mellitus), type 2 (HCC)     Hearing loss     Hypertension     Nocturia        Past Surgical History:   Procedure Laterality Date    CARDIAC CATHETERIZATION Left 2/27/2024    Procedure: Cardiac Left Heart Cath;  Surgeon: Jimbo Briggs MD;  Location: [facility];  Service: Cardiology    CARDIAC CATHETERIZATION N/A 2/27/2024    Procedure: Cardiac pci;  Surgeon: Jimbo  MD Chester;  Location: [facility];  Service: Cardiology    CORONARY ANGIOPLASTY WITH STENT PLACEMENT N/A 2013    EYE SURGERY      KNEE SURGERY      TONSILLECTOMY         Family History   Problem Relation Age of Onset    Cancer Mother     Heart failure Father      I have reviewed and agree with the history as documented.    E-Cigarette/Vaping    E-Cigarette Use Never User      E-Cigarette/Vaping Substances    Nicotine No     THC No     CBD No     Flavoring No     Other No     Unknown No      Social History     Tobacco Use    Smoking status: Never    Smokeless tobacco: Never   Vaping Use    Vaping status: Never Used   Substance Use Topics    Alcohol use: Yes    Drug use: No       Review of Systems   Constitutional:  Negative for chills and fever.   Eyes:  Negative for visual disturbance.   Respiratory:  Negative for cough, chest tightness and shortness of breath.    Cardiovascular:  Negative for chest pain and leg swelling.   Gastrointestinal:  Negative for abdominal pain, nausea and vomiting.   Musculoskeletal:  Negative for back pain and neck pain.   Skin:  Negative for color change, pallor, rash and wound.   Allergic/Immunologic: Negative for immunocompromised state.   Neurological:  Positive for dizziness, light-headedness and headaches. Negative for syncope.   All other systems reviewed and are negative.      Physical Exam  Physical Exam  Vitals and nursing note reviewed.   Constitutional:       General: He is not in acute distress.     Appearance: He is well-developed. He is not ill-appearing, toxic-appearing or diaphoretic.   HENT:      Head: Normocephalic and atraumatic.      Right Ear: External ear normal.      Left Ear: External ear normal.      Nose: No congestion.      Mouth/Throat:      Mouth: Oropharynx is clear and moist.   Eyes:      General: No scleral icterus.     Conjunctiva/sclera: Conjunctivae normal.      Right eye: Right conjunctiva is not injected.      Left eye: Left conjunctiva is not  injected.      Pupils: Pupils are equal, round, and reactive to light.   Neck:      Trachea: No tracheal deviation.   Cardiovascular:      Rate and Rhythm: Normal rate and regular rhythm.      Pulses: Normal pulses.      Heart sounds: Normal heart sounds. No murmur heard.     No friction rub.   Pulmonary:      Effort: Pulmonary effort is normal. No respiratory distress.      Breath sounds: Normal breath sounds. No stridor. No wheezing or rales.   Abdominal:      General: There is no distension.      Palpations: Abdomen is soft.      Tenderness: There is no abdominal tenderness. There is no guarding or rebound.   Musculoskeletal:         General: No tenderness, deformity or edema. Normal range of motion.      Cervical back: Normal range of motion and neck supple.   Skin:     General: Skin is warm and dry.      Capillary Refill: Capillary refill takes less than 2 seconds.      Coloration: Skin is not pale.      Findings: No erythema or rash.   Neurological:      Mental Status: He is alert and oriented to person, place, and time.      Motor: No abnormal muscle tone.   Psychiatric:         Mood and Affect: Mood and affect and mood normal.         Behavior: Behavior normal.         Vital Signs  ED Triage Vitals   Temperature Pulse Respirations Blood Pressure SpO2   07/18/24 2232 07/18/24 2232 07/18/24 2231 07/18/24 2232 07/18/24 2232   (!) 97.4 °F (36.3 °C) (!) 44 16 155/73 99 %      Temp src Heart Rate Source Patient Position - Orthostatic VS BP Location FiO2 (%)   -- 07/18/24 2232 07/18/24 2231 07/18/24 2231 --    Monitor Sitting Right arm       Pain Score       07/18/24 2231       3           Vitals:    07/18/24 2231 07/18/24 2232 07/18/24 2325 07/19/24 0030   BP:  155/73 164/73 140/65   Pulse:  (!) 44 69 66   Patient Position - Orthostatic VS: Sitting  Lying Lying         Visual Acuity      ED Medications  Medications   magnesium sulfate 2 g/50 mL IVPB (premix) 2 g (0 g Intravenous Stopped 7/19/24 0041)        Diagnostic Studies  Results Reviewed       Procedure Component Value Units Date/Time    HS Troponin I 4hr [[id number]]     Lab Status: No result Specimen: Blood     TSH, 3rd generation with Free T4 reflex [[id number]]  (Abnormal) Collected: 07/18/24 2304    Lab Status: Final result Specimen: Blood from Arm, Left Updated: 07/18/24 2340     TSH 3RD GENERATON 6.013 uIU/mL     T4, free [[id number]] Collected: 07/18/24 2304    Lab Status: In process Specimen: Blood from Arm, Left Updated: 07/18/24 2340    HS Troponin 0hr (reflex protocol) [[id number]]  (Normal) Collected: 07/18/24 2304    Lab Status: Final result Specimen: Blood from Arm, Left Updated: 07/18/24 2331     hs TnI 0hr 30 ng/L     HS Troponin I 2hr [[id number]]     Lab Status: No result Specimen: Blood     Comprehensive metabolic panel [[id number]] Collected: 07/18/24 2304    Lab Status: Final result Specimen: Blood from Arm, Left Updated: 07/18/24 2324     Sodium 139 mmol/L      Potassium 3.6 mmol/L      Chloride 106 mmol/L      CO2 27 mmol/L      ANION GAP 6 mmol/L      BUN 22 mg/dL      Creatinine 1.07 mg/dL      Glucose 83 mg/dL      Calcium 8.9 mg/dL      AST 16 U/L      ALT 20 U/L      Alkaline Phosphatase 81 U/L      Total Protein 6.6 g/dL      Albumin 4.4 g/dL      Total Bilirubin 0.51 mg/dL      eGFR 68 ml/min/1.73sq m     Narrative:      [employer] guidelines for Chronic Kidney Disease (CKD):     Stage 1 with normal or high GFR (GFR > 90 mL/min/1.73 square meters)    Stage 2 Mild CKD (GFR = 60-89 mL/min/1.73 square meters)    Stage 3A Moderate CKD (GFR = 45-59 mL/min/1.73 square meters)    Stage 3B Moderate CKD (GFR = 30-44 mL/min/1.73 square meters)    Stage 4 Severe CKD (GFR = 15-29 mL/min/1.73 square meters)    Stage 5 End Stage CKD (GFR <15 mL/min/1.73 square meters)  Note: GFR calculation is accurate only with a steady state creatinine    Magnesium [[id number]]  (Abnormal) Collected: 07/18/24 2304    Lab Status: Final  result Specimen: Blood from Arm, Left Updated: 07/18/24 2324     Magnesium 1.4 mg/dL     CBC and differential [[id number]]  (Abnormal) Collected: 07/18/24 2304    Lab Status: Final result Specimen: Blood from Arm, Left Updated: 07/18/24 2310     WBC 25.12 Thousand/uL      RBC 4.33 Million/uL      Hemoglobin 12.7 g/dL      Hematocrit 37.9 %      MCV 88 fL      MCH 29.3 pg      MCHC 33.5 g/dL      RDW 13.9 %      MPV 10.9 fL      Platelets 248 Thousands/uL     Manual Differential(PHLEBS Do Not Order) [[id number]] Collected: 07/18/24 2304    Lab Status: In process Specimen: Blood from Arm, Left Updated: 07/18/24 2310                   No orders to display              Procedures  ECG 12 Lead Documentation Only    Date/Time: 7/18/2024 10:44 PM    Performed by: Sukhdeep Woodward Jr., DO  Authorized by: Sukhdeep Woodward Jr., DO    Indications / Diagnosis:  Bradycardia  ECG reviewed by me, the ED Provider: yes    Patient location:  ED  Previous ECG:     Previous ECG:  Unavailable  Interpretation:     Interpretation: non-specific    Rate:     ECG rate:  63    ECG rate assessment: normal    Rhythm:     Rhythm: sinus rhythm    Ectopy:     Ectopy: PVCs    QRS:     QRS intervals:  Normal  Conduction:     Conduction: normal    ST segments:     ST segments:  Normal  T waves:     T waves: normal    ECG 12 Lead Documentation Only    Date/Time: 7/18/2024 11:32 PM    Performed by: Sukhdeep Woodward Jr., DO  Authorized by: Sukhdeep Woodward Jr., DO    Indications / Diagnosis:  Palpitations  Patient location:  ED  Previous ECG:     Previous ECG:  Compared to current    Similarity:  Changes noted  Interpretation:     Interpretation: abnormal    Rate:     ECG rate assessment: normal    Rhythm:     Rhythm: sinus rhythm    Ectopy:     Ectopy: bigeminy    QRS:     QRS intervals:  Normal  Conduction:     Conduction: normal    ST segments:     ST segments:  Non-specific  T waves:     T waves: non-specific             ED Course  ED Course as of  07/19/24 0041   Thu Jul 18, 2024   2321 CBC and differential(!)  At baseline.  Chronic leukocytosis    Fri Jul 19, 2024   0015 TSH, 3rd generation with Free T4 reflex(!)  Elevation but would not explain his symptoms   0015 HS Troponin 0hr (reflex protocol)  Elevated.  Will delta   0015 Magnesium(!)  Low.  Already replaced   0016 Comprehensive metabolic panel  Normal                                 SBIRT 22yo+      Flowsheet Row Most Recent Value   Initial Alcohol Screen: US AUDIT-C     1. How often do you have a drink containing alcohol? 0 Filed at: 07/18/2024 2246   2. How many drinks containing alcohol do you have on a typical day you are drinking?  0 Filed at: 07/18/2024 2246   3a. Male UNDER 65: How often do you have five or more drinks on one occasion? 0 Filed at: 07/18/2024 2246   3b. FEMALE Any Age, or MALE 65+: How often do you have 4 or more drinks on one occassion? 0 Filed at: 07/18/2024 2246   Audit-C Score 0 Filed at: 07/18/2024 2246   MAYDA: How many times in the past year have you...    Used an illegal drug or used a prescription medication for non-medical reasons? Never Filed at: 07/18/2024 2246                      Medical Decision Making  Patient appears well on exam.  His current heart rate is in the 60s.  No ischemic changes on EKG.  1 PVC but no other major abnormality.  It is reading low voltage but I do not feel that this is consistent with his history of cardiac tamponade.  Heart sounds are normal, blood pressure is normal and he states the symptoms are completely different when he had pericarditis that required a pericardiocentesis.  Patient has not changed medications and has not had any GI illness.  He had 2 episodes of bradycardia over the last 3 hours without symptoms.  Started feeling dizzy and lightheaded when he got here and his heart rate was in the 40s.  Explained to the patient and family this could be a beta-blocker overdose since he is on metoprolol but he has not taken any extra  metoprolol, did not take any tonight because of the symptoms and has not had any change in dose.  In addition, his blood pressure is normal at this time.  Possible sick sinus syndrome.  Does have a history of stents.  Explained this could be an atypical ACS presentation of a nonocclusive MI.  Will monitor with cardiac monitoring, serial EKGs and troponins.  If he does become unstable then we will perform a bedside echo to evaluate for pericardial effusion at this does not appear to the primary problem at this time.  Disposition depending on observation here in the emergency department, labs and patient symptoms.    11:33 PM  Patient started complaining of palpitations.  Patient was noted to have PVCs on the monitor.  Repeat EKG shows bigeminy.  Labs are pending other than his CBC.  No new anemia.  Will give a dose of IV magnesium at this time.  Pt is stable.    12:39 AM  Slightly elevated but this would not explain his symptoms.  Troponin is 30 which will require a delta.  Magnesium is low but this is already replaced.  No major abnormality on CMP identified.  Patient is in and out of bigeminy and stable at this time.  Vital signs are normal and his mentation is at baseline and has not declined here.  Case discussed with internal medicine who agrees for further evaluation and will admit to their service.    Amount and/or Complexity of Data Reviewed  Labs: ordered. Decision-making details documented in ED Course.    Risk  Prescription drug management.                 Disposition  Final diagnoses:   Bigeminy   Paroxysmal atrial fibrillation (HCC)   Hypomagnesemia     Time reflects when diagnosis was documented in both MDM as applicable and the Disposition within this note       Time User Action Codes Description Comment    7/19/2024 12:36 AM Sukhdeep Woodward [I49.8] Bigeminy     7/19/2024 12:37 AM Sukhdeep Woodward [I48.0] Paroxysmal atrial fibrillation (HCC)     7/19/2024 12:37 AM Sukhdeep Woodward  [E83.42] Hypomagnesemia     7/19/2024 12:38 AM Sukhdeep Woodward Add [C91.10] CLL (chronic lymphocytic leukemia) ([facility])     7/19/2024 12:38 AM Sukhdeep Woodward Remove [C91.10] CLL (chronic lymphocytic leukemia) ([facility])           ED Disposition       ED Disposition   Admit    Condition   Stable    Date/Time   Fri Jul 19, 2024 0036    Comment   Case was discussed with KIKE and the patient's admission status was agreed to be Admission Status: inpatient status to the service of Dr. Colunga .               Follow-up Information    None         Patient's Medications   Discharge Prescriptions    No medications on file       No discharge procedures on file.    PDMP Review       None            ED Provider  Electronically Signed by             Sukhdeep Woodward Jr.,   07/19/24 0041

## 2024-07-19 NOTE — ASSESSMENT & PLAN NOTE
Mag 1.3 on arrival, on 400mg mag oxide BID  Suspect multifactorial:  On protonix, which may be contributing to malabsorption of mag  On Monjaro with reduced appetite, leading to decreased mag intake  On Lasix 20mg daily, possible loss of mag    Discussed with patient. Agree to stop protonix (Switch to pepcid qHS and tums prn), change lasix to PRN for weight gain (no recent edema, EF 55%), and continue mag oxide supplement BID.  Follow up with PCP

## 2024-07-19 NOTE — DISCHARGE INSTR - AVS FIRST PAGE
Zio patch has been ordered by cardiologist.  It will come to you in the mail with instructions for use.  If you have any questions or require assistance for application, please call the cardiology office.

## 2024-07-19 NOTE — ASSESSMENT & PLAN NOTE
History of atrial fibrillation on PTA metoprolol as well as Eliquis  Continuing Eliquis and metoprolol

## 2024-07-19 NOTE — H&P
[facility]  H&P  Name: Cam Wilkins 73 y.o. male I MRN: [id number]  Unit/Bed#: E4 -01 I Date of Admission: 7/18/2024   Date of Service: 7/19/2024 I Hospital Day: 0      Assessment & Plan   * Bradycardia  Assessment & Plan  Patient reports feeling slightly more fatigued today and noted on his Apple Watch that his heart rate was in the 40s; he additionally reports that he then put a pulse ox on as he has 1 at home and this also noted his heart rate in the 40s  History of atrial fibrillation on PTA Eliquis as well as metoprolol  History of coronary artery disease status post drug-eluting stents x 2 to the circumflex artery as well as drug-eluting stent placement to the RCA in February 2024  EKG sinus with frequent PVCs; telemetry in room with intermittent episodes of bigeminy  Troponin negative x 1  Noted hypomagnesemia with magnesium 1.4  K 3.6  Admit to medicine.  Monitor on telemetry.  Given episodes of bradycardia noted at home, in the setting of underlying A-fib, some concern for underlying SSS.  As such, holding PTA metoprolol.  We will additionally replete patient's magnesium.  Order 2D echo. Consult CV team on case for further assistance.     Paroxysmal atrial fibrillation (HCC)  Assessment & Plan  History of atrial fibrillation on PTA metoprolol as well as Eliquis  Continuing Eliquis  Holding metoprolol given patient's reported bradycardia episodes    CLL (chronic lymphocytic leukemia) (HCC)  Assessment & Plan  Noted leukocytosis in the setting of underlying CLL  Afebrile  WBC similar to prior    Coronary artery disease involving native coronary artery without angina pectoris  Assessment & Plan  History of coronary artery disease with known two-vessel coronary artery disease status post IVUS guided IVL PCI/stent with placement of Two DARRION to the LCX artery as well as placement of DARRION to the RCA in February 2024  Continue PTA Plavix as well as statin  Last echo in March 2024  "with LVEF 60%, indeterminate diastolic dysfunction.  Mild mitral valve regurgitation as well as mild pulmonary hypertension    Type 2 diabetes mellitus without complication (HCC)  Assessment & Plan  Lab Results   Component Value Date    HGBA1C 5.8 (H) 06/06/2024       No results for input(s): \"POCGLU\" in the last 72 hours.    Blood Sugar Average: Last 72 hrs:    Sliding scale insulin while hospitalized    HTN (hypertension)  Assessment & Plan  Holding PTA Norvasc, Lasix and olmesartan overnight in the setting of bradycardia episode and monitoring for potential sick sinus syndrome               VTE Prophylaxis: Apixaban (Eliquis)  / sequential compression device and foot pump applied   Code Status: Level 1 - Full Code       Anticipated Length of Stay:  Patient will be admitted on an Inpatient basis with an anticipated length of stay of  > 2 midnights.   Justification for Hospital Stay: Please see detailed plans noted above.    Chief Complaint:     Bradycardia  History of Present Illness:  Cam Wilkins is a 73 y.o. male who has past medical history significant for atrial fibrillation on PTA metoprolol as well as Eliquis, coronary artery disease status post drug-eluting stent x 3 in February 2024, hypertension, CLL, diabetes, asthma who presented to [facility] ER anemia 7/18 with symptoms of fatigue over the last day as well as reported heart rate in the 40s on his Apple Watch earlier today.  Patient reports that the symptoms prompted him to come to the ER.  He further reports that he put on a pulse oximeter which she had at home and reports that his heart rate was reading in the 40s then.  Patient reports fatigue but denies any chest pain or shortness of breath.  Denies any fevers or chills.      Review of Systems:    Constitutional:  Denies fever or chills   Eyes:  Denies change in visual acuity   HENT:  Denies nasal congestion or sore throat   Respiratory:  Denies cough or shortness of breath "   Cardiovascular:  +bradycardia, Denies chest pain or edema   GI:  Denies abdominal pain or bloody stools  :  Denies dysuria   Musculoskeletal:  Denies back pain or joint pain   Integument:  Denies rash   Neurologic:  Denies headache or sensory changes   Endocrine:  Denies polyuria or polydipsia   Lymphatic:  Denies swollen glands   Psychiatric:  Denies depression or anxiety     Past Medical and Surgical History:   Past Medical History:   Diagnosis Date    Asthma     BPH (benign prostatic hyperplasia)     DM (diabetes mellitus), type 2 (HCC)     Hearing loss     Hypertension     Nocturia      Past Surgical History:   Procedure Laterality Date    CARDIAC CATHETERIZATION Left 2/27/2024    Procedure: Cardiac Left Heart Cath;  Surgeon: Jimbo Briggs MD;  Location: [facility];  Service: Cardiology    CARDIAC CATHETERIZATION N/A 2/27/2024    Procedure: Cardiac pci;  Surgeon: Jimbo Briggs MD;  Location: [facility];  Service: Cardiology    CORONARY ANGIOPLASTY WITH STENT PLACEMENT N/A 2013    EYE SURGERY      KNEE SURGERY      TONSILLECTOMY         Meds/Allergies:  Medications Prior to Admission   Medication Sig Dispense Refill Last Dose    amLODIPine (NORVASC) 2.5 mg tablet Take 1 tablet (2.5 mg total) by mouth daily 90 tablet 1     atorvastatin (LIPITOR) 80 mg tablet Take 1 tablet (80 mg total) by mouth daily 90 tablet 3     Cholecalciferol (Vitamin D3) 50 MCG (2000 UT) capsule Take 2,000 Units by mouth daily       clopidogrel (PLAVIX) 75 mg tablet Take 1 tablet (75 mg total) by mouth daily 90 tablet 3     Eliquis 5 MG Take 1 tablet (5 mg total) by mouth 2 (two) times a day 90 tablet 4     Fluticasone-Salmeterol (Advair Diskus) 250-50 mcg/dose inhaler Inhale 1 puff 2 (two) times a day 60 blister 2     furosemide (LASIX) 20 mg tablet Take 1 tablet (20 mg total) by mouth daily 90 tablet 3     ipratropium-albuterol (COMBIVENT RESPIMAT) inhaler as needed       Magnesium Oxide, Elemental, 400 MG TABS Take  400 mg by mouth 2 (two) times a day 90 tablet 3     metFORMIN (GLUCOPHAGE) 500 mg tablet Take 2 in AM one In  tablet 3     metoprolol succinate (TOPROL-XL) 50 mg 24 hr tablet Take 50 mg by mouth 2 (two) times a day       multivitamin-minerals (CENTRUM) tablet Take 1 tablet by mouth daily       olmesartan (BENICAR) 40 mg tablet olmesartan 40 mg tablet       pantoprazole (PROTONIX) 40 mg tablet Take 1 tablet (40 mg total) by mouth daily 90 tablet 3     [START ON 8/12/2024] tirzepatide (Mounjaro) 10 MG/0.5ML Inject 0.5 mL (10 mg total) under the skin every 7 days For diabetes Do not start before August 12, 2024. 2 mL 0     tirzepatide (Mounjaro) 7.5 MG/0.5ML Inject 0.5 mL (7.5 mg total) under the skin every 7 days 2 mL 0     vitamin B-12 (CYANOCOBALAMIN) 50 MCG TABS Place 1 tablet under the tongue daily       Zinc 10 MG LOZG Take by mouth          Allergies:   Allergies   Allergen Reactions    Celecoxib Palpitations    Clarithromycin     Rofecoxib Palpitations and Other (See Comments)     Heart palpations.     Other reaction(s): Erythema    All salmon inhibitors.       History:  Marital Status: /Civil Union     Substance Use History:   Social History     Substance and Sexual Activity   Alcohol Use Yes     Social History     Tobacco Use   Smoking Status Never   Smokeless Tobacco Never     Social History     Substance and Sexual Activity   Drug Use No       Family History:  Family History   Problem Relation Age of Onset    Cancer Mother     Heart failure Father        Physical Exam:     Vitals:   Blood Pressure: 152/68 (07/19/24 0100)  Pulse: 62 (07/19/24 0100)  Temperature: (!) 97.4 °F (36.3 °C) (07/18/24 2232)  Respirations: 15 (07/19/24 0100)  SpO2: 94 % (07/19/24 0100)    Constitutional:  Non-toxic appearance  Eyes:  EOMI, No scleral icterus   HENT:   oropharynx moist, external ears normal, external nose normal   Respiratory:  No respiratory distress, no wheezing   Cardiovascular:  bradycardia, no murmurs    GI:  Soft, nondistended, no guarding   :  No costovertebral angle tenderness   Musculoskeletal:  no tenderness, no deformities. Back- no tenderness  Integument:  no jaundice, no rash   Neurologic:  Alert &awake, communicative, CN 2-12 normal,  no focal deficits noted   Psychiatric:  Speech and behavior appropriate       Lab Results: I have personally reviewed pertinent reports.      Results from last 7 days   Lab Units 07/18/24  2304   WBC Thousand/uL 25.12*   HEMOGLOBIN g/dL 12.7   HEMATOCRIT % 37.9   PLATELETS Thousands/uL 248   LYMPHO PCT % 75*   MONO PCT % 2*   EOS PCT % 0     Results from last 7 days   Lab Units 07/18/24  2304   POTASSIUM mmol/L 3.6   CHLORIDE mmol/L 106   CO2 mmol/L 27   BUN mg/dL 22   CREATININE mg/dL 1.07   CALCIUM mg/dL 8.9   ALK PHOS U/L 81   ALT U/L 20   AST U/L 16             Imaging: I have personally reviewed pertinent reports.      No results found.    Total time for visit, including counseling/coordination of care: 75 minutes. Greater than 50% of this total time spent on direct patient counseling and coorination of care.     Epic Records Reviewed as well as Records in Care Everywhere    ** Please Note: Dragon 360 Dictation voice to text software was used in the creation of this document. **

## 2024-07-19 NOTE — ASSESSMENT & PLAN NOTE
History of coronary artery disease with known two-vessel coronary artery disease status post IVUS guided IVL PCI/stent with placement of Two DARRION to the LCX artery as well as placement of DARRION to the RCA in February 2024  Continue PTA Plavix as well as statin  Last echo in March 2024 with LVEF 60%, indeterminate diastolic dysfunction.  Mild mitral valve regurgitation as well as mild pulmonary hypertension

## 2024-07-22 ENCOUNTER — TELEPHONE (OUTPATIENT)
Dept: CARDIOLOGY CLINIC | Facility: CLINIC | Age: 74
End: 2024-07-22

## 2024-07-22 NOTE — TELEPHONE ENCOUNTER
----- Message from Fred Mesa PA-C sent at 7/19/2024 11:23 AM EDT -----  Regarding: zio and f/u  Prior pt of Dr. Matson  Hospitalized and seen by Dr. López 7/19  A 2 zio patch has been ordered - report to Dr. López with o/p care to be assumed by him  Pt will need follow up after Zio (?3-4 weeks)  Thanks

## 2024-07-22 NOTE — TELEPHONE ENCOUNTER
Contact was made with Mr. Wilkins and he states that he has had zio patches before in the past. Patient is aware of instructions, patient aware that it should be to him in about a week. Will enroll patient into [facility]. Patient verbally states that he understands and no questions or concerns at this time.

## 2024-07-23 ENCOUNTER — TELEPHONE (OUTPATIENT)
Age: 74
End: 2024-07-23

## 2024-07-31 ENCOUNTER — OFFICE VISIT (OUTPATIENT)
Dept: FAMILY MEDICINE CLINIC | Facility: CLINIC | Age: 74
End: 2024-07-31
Payer: MEDICARE

## 2024-07-31 ENCOUNTER — APPOINTMENT (OUTPATIENT)
Dept: LAB | Facility: MEDICAL CENTER | Age: 74
End: 2024-07-31
Payer: MEDICARE

## 2024-07-31 VITALS
RESPIRATION RATE: 16 BRPM | DIASTOLIC BLOOD PRESSURE: 72 MMHG | TEMPERATURE: 97 F | HEART RATE: 56 BPM | WEIGHT: 229.2 LBS | OXYGEN SATURATION: 98 % | HEIGHT: 72 IN | BODY MASS INDEX: 31.04 KG/M2 | SYSTOLIC BLOOD PRESSURE: 138 MMHG

## 2024-07-31 DIAGNOSIS — I48.0 PAROXYSMAL ATRIAL FIBRILLATION (HCC): ICD-10-CM

## 2024-07-31 DIAGNOSIS — K21.9 GASTROESOPHAGEAL REFLUX DISEASE WITHOUT ESOPHAGITIS: ICD-10-CM

## 2024-07-31 DIAGNOSIS — E83.42 HYPOMAGNESEMIA: ICD-10-CM

## 2024-07-31 DIAGNOSIS — E83.42 HYPOMAGNESEMIA: Primary | ICD-10-CM

## 2024-07-31 LAB
ANION GAP SERPL CALCULATED.3IONS-SCNC: 10 MMOL/L (ref 4–13)
BUN SERPL-MCNC: 16 MG/DL (ref 5–25)
CALCIUM SERPL-MCNC: 9.4 MG/DL (ref 8.4–10.2)
CHLORIDE SERPL-SCNC: 105 MMOL/L (ref 96–108)
CO2 SERPL-SCNC: 25 MMOL/L (ref 21–32)
CREAT SERPL-MCNC: 1.07 MG/DL (ref 0.6–1.3)
GFR SERPL CREATININE-BSD FRML MDRD: 68 ML/MIN/1.73SQ M
GLUCOSE P FAST SERPL-MCNC: 94 MG/DL (ref 65–99)
MAGNESIUM SERPL-MCNC: 2 MG/DL (ref 1.9–2.7)
POTASSIUM SERPL-SCNC: 4.9 MMOL/L (ref 3.5–5.3)
SODIUM SERPL-SCNC: 140 MMOL/L (ref 135–147)

## 2024-07-31 PROCEDURE — 36415 COLL VENOUS BLD VENIPUNCTURE: CPT

## 2024-07-31 PROCEDURE — 83735 ASSAY OF MAGNESIUM: CPT

## 2024-07-31 PROCEDURE — 80048 BASIC METABOLIC PNL TOTAL CA: CPT

## 2024-07-31 PROCEDURE — G2211 COMPLEX E/M VISIT ADD ON: HCPCS | Performed by: FAMILY MEDICINE

## 2024-07-31 PROCEDURE — 99214 OFFICE O/P EST MOD 30 MIN: CPT | Performed by: FAMILY MEDICINE

## 2024-07-31 RX ORDER — OLMESARTAN MEDOXOMIL 40 MG/1
TABLET ORAL
Status: CANCELLED | OUTPATIENT
Start: 2024-07-31

## 2024-07-31 RX ORDER — OLMESARTAN MEDOXOMIL 40 MG/1
40 TABLET ORAL DAILY
Qty: 90 TABLET | Refills: 1 | Status: SHIPPED | OUTPATIENT
Start: 2024-07-31

## 2024-07-31 NOTE — ASSESSMENT & PLAN NOTE
Reheck today, has stopped protonix and lasix since hospital,does note increased relfux off protonix

## 2024-07-31 NOTE — PROGRESS NOTES
Diabetic Foot Exam    Patient's shoes and socks removed.    Right Foot/Ankle   Right Foot Inspection  Skin Exam: skin normal, skin intact, dry skin, callus and callus. No warmth, no erythema, no maceration, no abnormal color, no pre-ulcer and no ulcer.     Toe Exam: ROM and strength within normal limits.     Sensory   Monofilament testing: intact    Vascular  Capillary refills: < 3 seconds  The right DP pulse is 2+. The right PT pulse is 2+.     Left Foot/Ankle  Left Foot Inspection  Skin Exam: skin normal, skin intact, dry skin and callus. No warmth, no erythema, no maceration, normal color, no pre-ulcer and no ulcer.     Toe Exam: ROM and strength within normal limits.     Sensory   Monofilament testing: diminished    Vascular  Capillary refills: < 3 seconds  The left DP pulse is 2+. The left PT pulse is 2+.     Assign Risk Category  No deformity present  Loss of protective sensation  No weak pulses  Risk: 1          Name: Cam Wilkins      : 1950      MRN: [id number]  Encounter Provider: Tremayne Lewis MD  Encounter Date: 2024   Encounter department: [facility]    Assessment & Plan   1. Hypomagnesemia  Assessment & Plan:  Reheck today, has stopped protonix and lasix since hospital,does note increased relfux off protonix   Orders:  -     Magnesium; Future  2. Paroxysmal atrial fibrillation (HCC)  -     Basic metabolic panel; Future  -     olmesartan (BENICAR) 40 mg tablet; Take 1 tablet (40 mg total) by mouth daily  3. Gastroesophageal reflux disease without esophagitis  Assessment & Plan:  Takne off protonix due to hypomagnesium, does not increased GERD, will use as needed,           HPI    Cam Wilkins is a 73 y.o. male patient with PMH Afib on eliquis, CAD with recent stent in 2024, HTN, CLL, T2DM, asthma who originally presented to the hospital on 2024 due to low heart rate reading on Apple watch. Asymptomatic. Admitted and monitored on  telemetry overnight without any arrhytmias, NSR with frequent PVC and PAC. Remained asymptomatic. Evaluated by Cardiology, and bradycardia on apple watch was likely due to misreading from frequent PVC/PAC. He is to remain on his metoprolol and eliquis. Zio patch will be sent to his house.     Of note, he was noted to be hypomagnesemic at 1.4 on arrival despite being compliant with mag oxide BID. We will take him off protonix and change lasix to PRN for 3lbs weight gain in order to try to help maintain normal mag level. He is to follow up with PCP in 2 weeks and these can be reintroduced if needed.     Since discharge he has been feeling well, no further warning from apple watch.    He does note increased GERD off protonix, placed zio patch this AM.    He notes his afib has reduced since discahrge as well.    Remains active and golfing for exercise.      Review of Systems   Constitutional:  Negative for activity change and appetite change.   Respiratory:  Negative for apnea and chest tightness.    Cardiovascular:  Negative for chest pain and palpitations.   Gastrointestinal:  Negative for abdominal distention and abdominal pain.   Musculoskeletal:  Negative for arthralgias and back pain.     Objective     /72 (BP Location: Left arm, Patient Position: Sitting, Cuff Size: Large)   Pulse 56   Temp (!) 97 °F (36.1 °C) (Tympanic)   Resp 16   Ht 6' (1.829 m)   Wt 104 kg (229 lb 3.2 oz)   SpO2 98%   BMI 31.09 kg/m²     Physical Exam  Constitutional:       Appearance: Normal appearance.   Cardiovascular:      Rate and Rhythm: Normal rate and regular rhythm.      Pulses: Normal pulses. no weak pulses.           Dorsalis pedis pulses are 2+ on the right side and 2+ on the left side.        Posterior tibial pulses are 2+ on the right side and 2+ on the left side.      Heart sounds: Normal heart sounds.   Pulmonary:      Effort: Pulmonary effort is normal.      Breath sounds: Normal breath sounds.   Musculoskeletal:          General: Normal range of motion.        Feet:    Feet:      Right foot:      Skin integrity: Callus and dry skin present. No ulcer, skin breakdown, erythema or warmth.      Left foot:      Skin integrity: Callus and dry skin present. No ulcer, skin breakdown, erythema or warmth.   Neurological:      General: No focal deficit present.      Mental Status: He is alert and oriented to person, place, and time.       Medications have been reviewed by provider in current encounter    Administrative Statements

## 2024-07-31 NOTE — PROGRESS NOTES
Diabetic Foot Exam    Patient's shoes and socks removed.    Right Foot/Ankle   Right Foot Inspection  Skin Exam: skin normal, skin intact, dry skin, callus and callus. No warmth, no erythema, no maceration, no abnormal color, no pre-ulcer and no ulcer.     Toe Exam: ROM and strength within normal limits.     Sensory   Monofilament testing: intact    Vascular  Capillary refills: < 3 seconds  The right DP pulse is 2+. The right PT pulse is 2+.     Left Foot/Ankle  Left Foot Inspection  Skin Exam: skin normal, skin intact, dry skin and callus. No warmth, no erythema, no maceration, normal color, no pre-ulcer and no ulcer.     Toe Exam: ROM and strength within normal limits.     Sensory   Monofilament testing: diminished    Vascular  Capillary refills: < 3 seconds  The left DP pulse is 2+. The left PT pulse is 2+.     Assign Risk Category  No deformity present  Loss of protective sensation  No weak pulses  Risk: 1          Name: Cam Wilkins      : 1950      MRN: [id number]  Encounter Provider: Tremayne Lewis MD  Encounter Date: 2024   Encounter department: [facility] PRIMARY [facility]    Assessment & Plan   1. Hypomagnesemia  Assessment & Plan:  Reheck today, has stopped protonix and lasix since hospital,does note increased relfux off protonix   Orders:  -     Magnesium; Future  2. Paroxysmal atrial fibrillation (HCC)  -     Basic metabolic panel; Future  -     olmesartan (BENICAR) 40 mg tablet; Take 1 tablet (40 mg total) by mouth daily  3. Gastroesophageal reflux disease without esophagitis  Assessment & Plan:  Takne off protonix due to hypomagnesium, does not increased GERD, will use as needed,           HPI    Cam Wilkins is a 73 y.o. male patient with PMH Afib on eliquis, CAD with recent stent in 2024, HTN, CLL, T2DM, asthma who originally presented to the hospital on 2024 due to low heart rate reading on Apple watch. Asymptomatic. Admitted and monitored on  telemetry overnight without any arrhytmias, NSR with frequent PVC and PAC. Remained asymptomatic. Evaluated by Cardiology, and bradycardia on apple watch was likely due to misreading from frequent PVC/PAC. He is to remain on his metoprolol and eliquis. Zio patch will be sent to his house.     Of note, he was noted to be hypomagnesemic at 1.4 on arrival despite being compliant with mag oxide BID. We will take him off protonix and change lasix to PRN for 3lbs weight gain in order to try to help maintain normal mag level. He is to follow up with PCP in 2 weeks and these can be reintroduced if needed.     Since discharge he has been feeling well, no further warning from apple watch.    He does note increased GERD off protonix, placed zio patch this AM.    He notes his afib has reduced since discahrge as well.    Remains active and golfing for exercise.      Review of Systems   Constitutional:  Negative for activity change and appetite change.   Respiratory:  Negative for apnea and chest tightness.    Cardiovascular:  Negative for chest pain and palpitations.   Gastrointestinal:  Negative for abdominal distention and abdominal pain.   Musculoskeletal:  Negative for arthralgias and back pain.     Objective     /72 (BP Location: Left arm, Patient Position: Sitting, Cuff Size: Large)   Pulse 56   Temp (!) 97 °F (36.1 °C) (Tympanic)   Resp 16   Ht 6' (1.829 m)   Wt 104 kg (229 lb 3.2 oz)   SpO2 98%   BMI 31.09 kg/m²     Physical Exam  Constitutional:       Appearance: Normal appearance.   Cardiovascular:      Rate and Rhythm: Normal rate and regular rhythm.      Pulses: Normal pulses. no weak pulses.           Dorsalis pedis pulses are 2+ on the right side and 2+ on the left side.        Posterior tibial pulses are 2+ on the right side and 2+ on the left side.      Heart sounds: Normal heart sounds.   Pulmonary:      Effort: Pulmonary effort is normal.      Breath sounds: Normal breath sounds.   Musculoskeletal:          General: Normal range of motion.        Feet:    Feet:      Right foot:      Skin integrity: Callus and dry skin present. No ulcer, skin breakdown, erythema or warmth.      Left foot:      Skin integrity: Callus and dry skin present. No ulcer, skin breakdown, erythema or warmth.   Neurological:      General: No focal deficit present.      Mental Status: He is alert and oriented to person, place, and time.       Medications have been reviewed by provider in current encounter    Administrative Statements

## 2024-08-01 ENCOUNTER — TELEPHONE (OUTPATIENT)
Age: 74
End: 2024-08-01

## 2024-08-01 NOTE — TELEPHONE ENCOUNTER
Patient called requesting refill for olmesartan. Patient made aware medication was refilled on 07/31/24 for 90 with 1 refills to [facility] pharmacy. Patient instructed to contact the pharmacy to obtain refills of medication. Patient verbalized understanding.

## 2024-08-02 ENCOUNTER — TELEPHONE (OUTPATIENT)
Dept: FAMILY MEDICINE CLINIC | Facility: CLINIC | Age: 74
End: 2024-08-02

## 2024-08-12 ENCOUNTER — OFFICE VISIT (OUTPATIENT)
Dept: CARDIOLOGY CLINIC | Facility: CLINIC | Age: 74
End: 2024-08-12
Payer: MEDICARE

## 2024-08-12 VITALS
DIASTOLIC BLOOD PRESSURE: 62 MMHG | SYSTOLIC BLOOD PRESSURE: 124 MMHG | HEIGHT: 72 IN | BODY MASS INDEX: 30.88 KG/M2 | OXYGEN SATURATION: 98 % | HEART RATE: 55 BPM | WEIGHT: 228 LBS

## 2024-08-12 DIAGNOSIS — I10 PRIMARY HYPERTENSION: Primary | ICD-10-CM

## 2024-08-12 DIAGNOSIS — E11.9 TYPE 2 DIABETES MELLITUS WITHOUT COMPLICATION, WITHOUT LONG-TERM CURRENT USE OF INSULIN (HCC): ICD-10-CM

## 2024-08-12 DIAGNOSIS — C91.10 CLL (CHRONIC LYMPHOCYTIC LEUKEMIA) (HCC): ICD-10-CM

## 2024-08-12 DIAGNOSIS — E78.2 MIXED HYPERLIPIDEMIA: ICD-10-CM

## 2024-08-12 DIAGNOSIS — I25.10 CORONARY ARTERY DISEASE INVOLVING NATIVE CORONARY ARTERY WITHOUT ANGINA PECTORIS, UNSPECIFIED WHETHER NATIVE OR TRANSPLANTED HEART: ICD-10-CM

## 2024-08-12 DIAGNOSIS — I48.0 PAROXYSMAL ATRIAL FIBRILLATION (HCC): ICD-10-CM

## 2024-08-12 PROCEDURE — 99214 OFFICE O/P EST MOD 30 MIN: CPT

## 2024-08-12 NOTE — PROGRESS NOTES
Cardiology   MD Scott Cagle MD, FACC  Chris Linares DO, [facility] FACP, FASNC Ather Mansoor, MD Rujul Patel, DO, [facility]  Yanick López DO, [facility]  -------------------------------------------------------------------  [facility]  [address] [place] [address]  Phone: [phone]  Fax: [phone]  08/12/24  Cam Wilkins  YOB: 1950   MRN: [id number]      Referring Physician: Tremayne Lewis MD  [address] [place] [id number]     HPI: Cam Wilkins is a 73 y.o. male with:   PVCs  Parox afib  Hypomagnesemia  CAD  HTN  HLD  Pericarditis  DM - on Mounjaro  CLL  Venous insufficiency - wears compression stockings that help tremendously with this ([employer])    He was seen in the hospital for low HR - which was his apple watch under sensing due to his PVCs  Feeling fine right now  Has zio patch      Review of Systems   Constitutional:  Negative for chills and fever.   HENT:  Negative for facial swelling and sore throat.    Eyes:  Negative for visual disturbance.   Respiratory:  Negative for cough, chest tightness, shortness of breath and wheezing.    Cardiovascular:  Positive for leg swelling. Negative for chest pain and palpitations.   Gastrointestinal:  Negative for abdominal pain, blood in stool, constipation, diarrhea, nausea and vomiting.   Endocrine: Negative for cold intolerance and heat intolerance.   Genitourinary:  Negative for decreased urine volume, difficulty urinating, dysuria and hematuria.   Musculoskeletal:  Negative for arthralgias, back pain and myalgias.   Skin:  Negative for rash.   Neurological:  Negative for dizziness, syncope, weakness and numbness.   Psychiatric/Behavioral:  Negative for agitation, behavioral problems and confusion. The patient is not nervous/anxious.         OBJECTIVE  Vitals:    08/12/24 0851   BP: 124/62   Pulse: 55   SpO2: 98%       Physical  Exam  Constitutional: awake, alert and oriented, in no acute distress, no obvious deformities  Head: Normocephalic, without obvious abnormality, atraumatic  Eyes: conjunctivae clear and moist. Sclera anicteric.  No xanthelasmas. Pupils equal bilaterally.  Extraocular motions are full.  Ear nose mouth and throat: ears are symmetrical bilaterally, hearing appears to be equal bilaterally, no nasal discharge or epistaxis, oropharynx is clear with moist mucous membranes  Neck:  Trachea is midline, neck is supple, no thyromegaly or significant lymphadenopathy, there is full range of motion.  Lungs: clear to auscultation bilaterally, no wheezes, no rales, no rhonchi, no accessory muscle use, breathing is nonlabored  Heart: Regular rhythm with a Normal heart rate, S1, S2 normal, No Murmur, no click, rub or gallop, No lower extremity edema  Abdomen: soft, non-tender; bowel sounds normal; no masses,  no organomegaly  Psychiatric:  Patient is oriented to time, place, person, mood/affect is negative for depression, anxiety, agitation, appears to have appropriate insight  Skin: Skin is warm, dry, intact. No obvious rashes or lesions on exposed extremities.  Nail beds are pink with no cyanosis or clubbing.    EKG:  No results found for this visit on 08/12/24.     IMPRESSION:  PVCs  Parox afib  Hypomagnesemia  CAD  HTN  HLD  Pericarditis  DM - on Mounjaro  CLL  Venous insufficiency - wears compression stockings that help tremendously with this    DISCUSSION/RECOMMENDATIONS:  He currently has zio patch on - will review when available  No cardiac symtpoms right now  On eliquis for ac - - continue  On metoprolol 50 BID - continue  BP stable - continue benicar 40mg    Yanick López DO, FACGIDEON, GENO  --------------------------------------------------------------------------------  TREADMILL STRESS  No results found for this or any previous visit.      ----------------------------------------------------------------------------------------------  NUCLEAR STRESS TEST: No results found for this or any previous visit.    No results found for this or any previous visit.      --------------------------------------------------------------------------------  CATH:  No results found for this or any previous visit.    --------------------------------------------------------------------------------  ECHO:   No results found for this or any previous visit.    No results found for this or any previous visit.    --------------------------------------------------------------------------------  HOLTER  No results found for this or any previous visit.    No results found for this or any previous visit.    --------------------------------------------------------------------------------  CAROTIDS  No results found for this or any previous visit.     --------------------------------------------------------------------------------  Diagnoses and all orders for this visit:    Primary hypertension    Coronary artery disease involving native coronary artery without angina pectoris, unspecified whether native or transplanted heart    Paroxysmal atrial fibrillation (HCC)    Type 2 diabetes mellitus without complication, without long-term current use of insulin (HCC)    CLL (chronic lymphocytic leukemia) (HCC)    Mixed hyperlipidemia       ======================================================    Past Medical History:   Diagnosis Date   • Asthma    • BPH (benign prostatic hyperplasia)    • DM (diabetes mellitus), type 2 (HCC)    • Hearing loss    • Hypertension    • Nocturia      Past Surgical History:   Procedure Laterality Date   • CARDIAC CATHETERIZATION Left 2/27/2024    Procedure: Cardiac Left Heart Cath;  Surgeon: Jimbo Briggs MD;  [facility];  Service: Cardiology   • CARDIAC CATHETERIZATION N/A 2/27/2024    Procedure: Cardiac pci;  Surgeon: Jimbo Briggs MD;   Location:  CARDIAC CATH LAB;  Service: Cardiology   • CORONARY ANGIOPLASTY WITH STENT PLACEMENT N/A 2013   • EYE SURGERY     • KNEE SURGERY     • TONSILLECTOMY           Medications  Current Outpatient Medications   Medication Sig Dispense Refill   • amLODIPine (NORVASC) 2.5 mg tablet Take 1 tablet (2.5 mg total) by mouth daily 90 tablet 1   • atorvastatin (LIPITOR) 80 mg tablet Take 1 tablet (80 mg total) by mouth daily 90 tablet 3   • Cholecalciferol (Vitamin D3) 50 MCG (2000 UT) capsule Take 2,000 Units by mouth daily     • clopidogrel (PLAVIX) 75 mg tablet Take 1 tablet (75 mg total) by mouth daily 90 tablet 3   • Eliquis 5 MG Take 1 tablet (5 mg total) by mouth 2 (two) times a day 90 tablet 4   • famotidine (PEPCID) 20 mg tablet Take 1 tablet (20 mg total) by mouth daily at bedtime 30 tablet 0   • Fluticasone-Salmeterol (Advair Diskus) 250-50 mcg/dose inhaler Inhale 1 puff 2 (two) times a day 60 blister 2   • glucose blood test strip Use 1 each 3 (three) times a day     • Ipratropium-Albuterol (COMBIVENT IN) Take 1 puff by mouth in the morning     • ipratropium-albuterol (COMBIVENT RESPIMAT) inhaler as needed     • Magnesium Oxide, Elemental, 400 MG TABS Take 400 mg by mouth 2 (two) times a day 90 tablet 3   • metFORMIN (GLUCOPHAGE) 500 mg tablet Take 2 in AM one In  tablet 3   • metoprolol succinate (TOPROL-XL) 50 mg 24 hr tablet Take 50 mg by mouth 2 (two) times a day     • multivitamin-minerals (CENTRUM) tablet Take 1 tablet by mouth daily     • olmesartan (BENICAR) 40 mg tablet Take 1 tablet (40 mg total) by mouth daily 90 tablet 1   • tirzepatide (Mounjaro) 7.5 MG/0.5ML Inject 0.5 mL (7.5 mg total) under the skin every 7 days 2 mL 0   • vitamin B-12 (CYANOCOBALAMIN) 50 MCG TABS Place 1 tablet under the tongue daily     • Zinc 10 MG LOZG Take by mouth       No current facility-administered medications for this visit.        Allergies   Allergen Reactions   • Celecoxib Palpitations   • Clarithromycin     • Rofecoxib Palpitations and Other (See Comments)     Heart palpations.     Other reaction(s): Erythema    All salmon inhibitors.       Social History     Socioeconomic History   • Marital status: /Civil Union     Spouse name: Not on file   • Number of children: Not on file   • Years of education: Not on file   • Highest education level: Not on file   Occupational History   • Not on file   Tobacco Use   • Smoking status: Never   • Smokeless tobacco: Never   Vaping Use   • Vaping status: Never Used   Substance and Sexual Activity   • Alcohol use: Yes     Alcohol/week: 1.0 standard drink of alcohol     Types: 1 Cans of beer per week     Comment: social   • Drug use: No   • Sexual activity: Not on file   Other Topics Concern   • Not on file   Social History Narrative   • Not on file     Social Determinants of Health     Financial Resource Strain: Not on file   Food Insecurity: Patient Declined (5/29/2024)    Hunger Vital Sign    • Worried About Running Out of Food in the Last Year: Patient declined    • Ran Out of Food in the Last Year: Patient declined   Transportation Needs: Patient Declined (5/29/2024)    PRAPARE - Transportation    • Lack of Transportation (Medical): Patient declined    • Lack of Transportation (Non-Medical): Patient declined   Physical Activity: Not on file   Stress: Not on file   Social Connections: Not on file   Intimate Partner Violence: Not on file   Housing Stability: Patient Declined (5/29/2024)    Housing Stability Vital Sign    • Unable to Pay for Housing in the Last Year: Patient declined    • Number of Times Moved in the Last Year: 0    • Homeless in the Last Year: Patient declined        Family History   Problem Relation Age of Onset   • Cancer Mother    • Heart failure Father        Lab Results   Component Value Date    WBC 20.02 (H) 07/19/2024    HGB 12.6 07/19/2024    HCT 37.2 07/19/2024    MCV 88 07/19/2024     07/19/2024      Lab Results   Component Value Date     "SODIUM 140 07/31/2024    K 4.9 07/31/2024     07/31/2024    CO2 25 07/31/2024    BUN 16 07/31/2024    CREATININE 1.07 07/31/2024    GLUC 85 07/19/2024    CALCIUM 9.4 07/31/2024      Lab Results   Component Value Date    HGBA1C 5.8 (H) 06/06/2024      No results found for: \"CHOL\"  Lab Results   Component Value Date    HDL 31 (L) 06/06/2024    HDL 37 (L) 11/30/2023     Lab Results   Component Value Date    LDLCALC 39 06/06/2024    LDLCALC 55 11/30/2023     Lab Results   Component Value Date    TRIG 136 06/06/2024    TRIG 180 (H) 11/30/2023     No results found for: \"CHOLHDL\"   Lab Results   Component Value Date    INR 1.34 (H) 07/19/2024    PROTIME 16.8 (H) 07/19/2024          Patient Active Problem List    Diagnosis Date Noted   • Bradycardia 07/19/2024   • Hypomagnesemia 07/19/2024   • Status post insertion of drug eluting coronary artery stent 02/27/2024   • CLL (chronic lymphocytic leukemia) ([facility]) 11/30/2023   • Paroxysmal atrial fibrillation ([facility]) 11/30/2023   • Mild intermittent asthma without complication 06/13/2019   • Coronary artery disease involving native coronary artery without angina pectoris 06/13/2019   • Cellulitis of left lower extremity 06/12/2019   • Charcot foot due to diabetes mellitus ([facility]) 06/12/2019   • HTN (hypertension) 06/12/2019   • HLD (hyperlipidemia) 06/12/2019   • GERD (gastroesophageal reflux disease) 06/12/2019   • Gout involving toe of left foot 06/12/2019   • Type 2 diabetes mellitus without complication ([facility]) 06/12/2019   • Benign prostatic hyperplasia with nocturia 03/14/2019       Portions of the record may have been created with voice recognition software. Occasional wrong word or \"sound a like\" substitutions may have occurred due to the inherent limitations of voice recognition software. Read the chart carefully and recognize, using context, where substitutions have occurred.    Yanick López DO, FACC  8/12/2024 9:40 AM          "

## 2024-08-19 NOTE — TELEPHONE ENCOUNTER
Received paper script via fax for Potassium Chloride ER 10MEQ TBCR 10. According to active med list Pt is on 20 mEQ and it was supposed to end 7/19/24. Called Pt to confirm no answer LM. Please confirm if Pt is still taking this medication, if so confirm dosage.

## 2024-08-22 ENCOUNTER — CLINICAL SUPPORT (OUTPATIENT)
Dept: CARDIOLOGY CLINIC | Facility: CLINIC | Age: 74
End: 2024-08-22

## 2024-08-22 DIAGNOSIS — R00.1 BRADYCARDIA: ICD-10-CM

## 2024-09-03 ENCOUNTER — TELEPHONE (OUTPATIENT)
Age: 74
End: 2024-09-03

## 2024-09-06 DIAGNOSIS — E11.9 TYPE 2 DIABETES MELLITUS WITHOUT COMPLICATION, WITHOUT LONG-TERM CURRENT USE OF INSULIN (HCC): ICD-10-CM

## 2024-09-09 RX ORDER — TIRZEPATIDE 10 MG/.5ML
INJECTION, SOLUTION SUBCUTANEOUS
Qty: 2 ML | Refills: 0 | Status: SHIPPED | OUTPATIENT
Start: 2024-09-09

## 2024-10-08 DIAGNOSIS — E11.9 TYPE 2 DIABETES MELLITUS WITHOUT COMPLICATION, WITHOUT LONG-TERM CURRENT USE OF INSULIN (HCC): ICD-10-CM

## 2024-10-08 NOTE — TELEPHONE ENCOUNTER
Reason for call:   [x] Refill   [] Prior Auth  [] Other:     Office:   [x] PCP/Provider -   [] Specialty/Provider -     Medication:   Mounjaro 10mg/0.5ml- Inject 0.5ml under the skin every 7 days for diabetes      Pharmacy: [facility] [address] [place]    Does the patient have enough for 3 days?   [x] Yes   [] No - Send as HP to POD

## 2024-10-09 RX ORDER — TIRZEPATIDE 10 MG/.5ML
INJECTION, SOLUTION SUBCUTANEOUS
Qty: 2 ML | Refills: 5 | Status: SHIPPED | OUTPATIENT
Start: 2024-10-09

## 2024-10-16 ENCOUNTER — TELEPHONE (OUTPATIENT)
Age: 74
End: 2024-10-16

## 2024-10-16 DIAGNOSIS — N13.8 BPH WITH URINARY OBSTRUCTION: Primary | ICD-10-CM

## 2024-10-16 DIAGNOSIS — N40.1 BPH WITH URINARY OBSTRUCTION: Primary | ICD-10-CM

## 2024-10-16 NOTE — TELEPHONE ENCOUNTER
Pt called stating he had incoming call from  office on caller ID I looked for documentation and see on appointment note psa needed,pt had psa done 11/14/23 MCR will not cover if done earlier than a year,if  wants another psa it would need to be with different diag.

## 2024-10-23 DIAGNOSIS — J45.909 UNCOMPLICATED ASTHMA, UNSPECIFIED ASTHMA SEVERITY, UNSPECIFIED WHETHER PERSISTENT: Primary | ICD-10-CM

## 2024-10-24 NOTE — TELEPHONE ENCOUNTER
Called Pt as requested per PCP to confirm the need of this medication. Pt stated Yes he is out of it and has the need of a refill.

## 2024-10-28 ENCOUNTER — OFFICE VISIT (OUTPATIENT)
Dept: UROLOGY | Facility: MEDICAL CENTER | Age: 74
End: 2024-10-28
Payer: MEDICARE

## 2024-10-28 VITALS
OXYGEN SATURATION: 98 % | WEIGHT: 224 LBS | DIASTOLIC BLOOD PRESSURE: 80 MMHG | BODY MASS INDEX: 30.34 KG/M2 | HEIGHT: 72 IN | SYSTOLIC BLOOD PRESSURE: 128 MMHG | HEART RATE: 70 BPM

## 2024-10-28 DIAGNOSIS — N13.8 BPH WITH URINARY OBSTRUCTION: Primary | ICD-10-CM

## 2024-10-28 DIAGNOSIS — N40.1 BPH WITH URINARY OBSTRUCTION: Primary | ICD-10-CM

## 2024-10-28 PROCEDURE — 99213 OFFICE O/P EST LOW 20 MIN: CPT | Performed by: UROLOGY

## 2024-10-28 RX ORDER — PANTOPRAZOLE SODIUM 40 MG/1
1 TABLET, DELAYED RELEASE ORAL DAILY
COMMUNITY
Start: 2024-10-18

## 2024-10-28 NOTE — PROGRESS NOTES
"   HISTORY:    Follow-up mild BPH, not bothered and no real change over the past couple years.    Flow is decent, nocturia x 1 or so.  No hematuria infection stones    PSA is always low         ASSESSMENT / PLAN:    Check PSA late November    He finds the rectal exam quite unpleasant, but understands the reasons for it.    Patient will like to continue once per year follow-up.    The following portions of the patient's history were reviewed and updated as appropriate: allergies, current medications, past family history, past medical history, past social history, past surgical history, and problem list.    Review of Systems      Objective:     Physical Exam  Genitourinary:     Comments: Penis testes normal    Prostate mildly enlarged no nodules          0   Lab Value Date/Time    PSA 1.13 11/14/2023 1230   ]  BUN   Date Value Ref Range Status   07/31/2024 16 5 - 25 mg/dL Final   04/05/2023 20 7 - 28 mg/dL Final     Creatinine   Date Value Ref Range Status   07/31/2024 1.07 0.60 - 1.30 mg/dL Final     Comment:     Standardized to IDMS reference method   04/05/2023 1.22 0.53 - 1.30 mg/dL Final     No components found for: \"CBC\"      Patient Active Problem List   Diagnosis    Benign prostatic hyperplasia with nocturia    Cellulitis of left lower extremity    Charcot foot due to diabetes mellitus (HCC)    HTN (hypertension)    HLD (hyperlipidemia)    GERD (gastroesophageal reflux disease)    Gout involving toe of left foot    Type 2 diabetes mellitus without complication (HCC)    Mild intermittent asthma without complication    Coronary artery disease involving native coronary artery without angina pectoris    CLL (chronic lymphocytic leukemia) (HCC)    Paroxysmal atrial fibrillation (HCC)    Status post insertion of drug eluting coronary artery stent    Bradycardia    Hypomagnesemia        Diagnoses and all orders for this visit:    BPH with urinary obstruction  -     PSA Total, Diagnostic; Future    Other orders  -     " pantoprazole (PROTONIX) 40 mg tablet; Take 1 tablet by mouth in the morning           Patient ID: Cam Wilkins is a 74 y.o. adult.      Current Outpatient Medications:     amLODIPine (NORVASC) 2.5 mg tablet, Take 1 tablet (2.5 mg total) by mouth daily, Disp: 90 tablet, Rfl: 1    atorvastatin (LIPITOR) 80 mg tablet, Take 1 tablet (80 mg total) by mouth daily, Disp: 90 tablet, Rfl: 3    Cholecalciferol (Vitamin D3) 50 MCG (2000 UT) capsule, Take 2,000 Units by mouth daily, Disp: , Rfl:     clopidogrel (PLAVIX) 75 mg tablet, Take 1 tablet (75 mg total) by mouth daily, Disp: 90 tablet, Rfl: 3    Eliquis 5 MG, Take 1 tablet (5 mg total) by mouth 2 (two) times a day, Disp: 90 tablet, Rfl: 4    famotidine (PEPCID) 20 mg tablet, Take 1 tablet (20 mg total) by mouth daily at bedtime, Disp: 30 tablet, Rfl: 0    Fluticasone-Salmeterol (Advair Diskus) 250-50 mcg/dose inhaler, Inhale 1 puff 2 (two) times a day, Disp: 60 blister, Rfl: 2    glucose blood test strip, Use 1 each 3 (three) times a day, Disp: , Rfl:     Ipratropium-Albuterol (COMBIVENT IN), Take 1 puff by mouth in the morning, Disp: , Rfl:     ipratropium-albuterol (COMBIVENT RESPIMAT) inhaler, Inhale 1 puff as needed (Inhale 1 puff by mouth 4 times a day as needed for wheezing), Disp: 4 g, Rfl: 1    Magnesium Oxide, Elemental, 400 MG TABS, Take 400 mg by mouth 2 (two) times a day, Disp: 90 tablet, Rfl: 3    metFORMIN (GLUCOPHAGE) 500 mg tablet, Take 2 in AM one In PM, Disp: 300 tablet, Rfl: 3    metoprolol succinate (TOPROL-XL) 50 mg 24 hr tablet, Take 50 mg by mouth 2 (two) times a day, Disp: , Rfl:     Mounjaro 10 MG/0.5ML, INJECT 0.5 ML UNDER THE SKIN EVERY 7 DAYS FOR DIABETES. DO NOT START BEFORE AUGUST 12, 2024, Disp: 2 mL, Rfl: 5    multivitamin-minerals (CENTRUM) tablet, Take 1 tablet by mouth daily, Disp: , Rfl:     olmesartan (BENICAR) 40 mg tablet, Take 1 tablet (40 mg total) by mouth daily, Disp: 90 tablet, Rfl: 1    pantoprazole (PROTONIX) 40 mg  tablet, Take 1 tablet by mouth in the morning, Disp: , Rfl:     vitamin B-12 (CYANOCOBALAMIN) 50 MCG TABS, Place 1 tablet under the tongue daily, Disp: , Rfl:     Zinc 10 MG LOZG, Take by mouth, Disp: , Rfl:     Past Medical History:   Diagnosis Date    Asthma     BPH (benign prostatic hyperplasia)     DM (diabetes mellitus), type 2 (HCC)     Hearing loss     Hypertension     Nocturia        Past Surgical History:   Procedure Laterality Date    CARDIAC CATHETERIZATION Left 2/27/2024    Procedure: Cardiac Left Heart Cath;  Surgeon: Jimbo Briggs MD;  [facility];  Service: Cardiology    CARDIAC CATHETERIZATION N/A 2/27/2024    Procedure: Cardiac pci;  Surgeon: Jimbo Briggs MD;  Location[facility];  Service: Cardiology    CORONARY ANGIOPLASTY WITH STENT PLACEMENT N/A 2013    EYE SURGERY      KNEE SURGERY      TONSILLECTOMY         Social History

## 2024-11-04 DIAGNOSIS — E83.42 HYPOMAGNESEMIA: ICD-10-CM

## 2024-11-04 NOTE — TELEPHONE ENCOUNTER
Reason for call:   [x] Refill   [] Prior Auth  [] Other:     Office:   [] PCP/Provider -   [x] Specialty/Provider - cardio    Medication:   Magnesium Oxide, Elemental, 400 MG TABS     Dose/Frequency: Sig: Take 400 mg by mouth 2 (two) times a day    Quantity: 180    Pharmacy:   [facility] [id number] [place] [id number]  Phone: [phone]  Fax: [phone]    Does the patient have enough for 3 days?   [] Yes   [x] No - Send as HP to POD

## 2024-11-26 DIAGNOSIS — I25.10 CORONARY ARTERY DISEASE INVOLVING NATIVE CORONARY ARTERY WITHOUT ANGINA PECTORIS, UNSPECIFIED WHETHER NATIVE OR TRANSPLANTED HEART: ICD-10-CM

## 2024-11-27 RX ORDER — AMLODIPINE BESYLATE 2.5 MG/1
2.5 TABLET ORAL DAILY
Qty: 90 TABLET | Refills: 1 | Status: SHIPPED | OUTPATIENT
Start: 2024-11-27

## 2024-12-05 ENCOUNTER — OFFICE VISIT (OUTPATIENT)
Dept: FAMILY MEDICINE CLINIC | Facility: CLINIC | Age: 74
End: 2024-12-05
Payer: MEDICARE

## 2024-12-05 VITALS
DIASTOLIC BLOOD PRESSURE: 78 MMHG | HEART RATE: 62 BPM | HEIGHT: 72 IN | SYSTOLIC BLOOD PRESSURE: 136 MMHG | BODY MASS INDEX: 29.93 KG/M2 | WEIGHT: 221 LBS | OXYGEN SATURATION: 98 %

## 2024-12-05 DIAGNOSIS — I25.10 CORONARY ARTERY DISEASE INVOLVING NATIVE CORONARY ARTERY WITHOUT ANGINA PECTORIS, UNSPECIFIED WHETHER NATIVE OR TRANSPLANTED HEART: ICD-10-CM

## 2024-12-05 DIAGNOSIS — E11.9 TYPE 2 DIABETES MELLITUS WITHOUT COMPLICATION, WITHOUT LONG-TERM CURRENT USE OF INSULIN (HCC): Primary | ICD-10-CM

## 2024-12-05 DIAGNOSIS — I10 PRIMARY HYPERTENSION: ICD-10-CM

## 2024-12-05 LAB — SL AMB POCT HEMOGLOBIN AIC: 5.3 (ref ?–6.5)

## 2024-12-05 PROCEDURE — 99214 OFFICE O/P EST MOD 30 MIN: CPT | Performed by: FAMILY MEDICINE

## 2024-12-05 PROCEDURE — 83036 HEMOGLOBIN GLYCOSYLATED A1C: CPT | Performed by: FAMILY MEDICINE

## 2024-12-05 RX ORDER — DULAGLUTIDE 1.5 MG/.5ML
INJECTION, SOLUTION SUBCUTANEOUS
COMMUNITY
End: 2024-12-05

## 2024-12-05 NOTE — ASSESSMENT & PLAN NOTE
Lab Results   Component Value Date    HGBA1C 5.3 12/05/2024     A1c well controlled wants to reduce medications, reduce  metformin at this time    Orders:    POCT hemoglobin A1c    TSH, 3rd generation with Free T4 reflex; Future    Comprehensive metabolic panel; Future    Magnesium; Future    Lipid panel; Future    Hemoglobin A1C; Future    metFORMIN (GLUCOPHAGE) 500 mg tablet; Take 1 tablet (500 mg total) by mouth daily with breakfast    Albumin / creatinine urine ratio; Future

## 2024-12-05 NOTE — PROGRESS NOTES
Name: Cam Wilkins      : 1950      MRN: [id number]  Encounter Provider: Tremayne Lewis MD  Encounter Date: 2024   Encounter department: [facility] PRIMARY [facility]  :  Assessment & Plan  Type 2 diabetes mellitus without complication, without long-term current use of insulin ([facility])    Lab Results   Component Value Date    HGBA1C 5.3 2024     A1c well controlled wants to reduce medications, reduce  metformin at this time    Orders:    POCT hemoglobin A1c    TSH, 3rd generation with Free T4 reflex; Future    Comprehensive metabolic panel; Future    Magnesium; Future    Lipid panel; Future    Hemoglobin A1C; Future    metFORMIN (GLUCOPHAGE) 500 mg tablet; Take 1 tablet (500 mg total) by mouth daily with breakfast    Albumin / creatinine urine ratio; Future    Primary hypertension    Remains well controlled       Coronary artery disease involving native coronary artery without angina pectoris, unspecified whether native or transplanted heart    Continues in cardiology follow up, doing well.              History of Present Illness     HPI    74 year old presenting in htn and diabetes follow up.    Overall feeling well since last visit.    A1c very well controlled, continues in follow up with cardiology.      Review of Systems   Constitutional:  Negative for activity change and appetite change.   Respiratory:  Negative for apnea and chest tightness.    Cardiovascular:  Negative for chest pain and palpitations.   Gastrointestinal:  Negative for abdominal distention and abdominal pain.   Musculoskeletal:  Negative for arthralgias and back pain.          Objective   /78 (BP Location: Left arm, Patient Position: Sitting, Cuff Size: Large)   Pulse 62   Ht 6' (1.829 m)   Wt 100 kg (221 lb)   SpO2 98%   BMI 29.97 kg/m²      Physical Exam  Constitutional:       Appearance: Normal appearance.   Cardiovascular:      Rate and Rhythm: Normal rate and regular rhythm.      Pulses:  Normal pulses.      Heart sounds: Normal heart sounds.   Pulmonary:      Effort: Pulmonary effort is normal.      Breath sounds: Normal breath sounds.   Abdominal:      General: Abdomen is flat.   Musculoskeletal:         General: Normal range of motion.   Neurological:      General: No focal deficit present.      Mental Status: He is alert and oriented to person, place, and time.

## 2024-12-11 DIAGNOSIS — J45.909 UNCOMPLICATED ASTHMA, UNSPECIFIED ASTHMA SEVERITY, UNSPECIFIED WHETHER PERSISTENT: ICD-10-CM

## 2024-12-11 DIAGNOSIS — E11.9 TYPE 2 DIABETES MELLITUS WITHOUT COMPLICATION, WITHOUT LONG-TERM CURRENT USE OF INSULIN (HCC): ICD-10-CM

## 2024-12-11 NOTE — TELEPHONE ENCOUNTER
Not a duplicate  Patient wife called in requesting a 3 month supply for:    Reason for call:   [x] Refill   [] Prior Auth  [] Other:     Office:   [x] PCP/Provider -   [] Specialty/Provider -     Medication:     (COMBIVENT RESPIMAT) inhaler     Mounjaro 10 MG/0.5ML       Pharmacy: [facility] [id number] -     Does the patient have enough for 3 days?   [x] Yes   [] No - Send as HP to POD

## 2025-02-07 DIAGNOSIS — I48.0 PAROXYSMAL ATRIAL FIBRILLATION (HCC): ICD-10-CM

## 2025-02-07 RX ORDER — OLMESARTAN MEDOXOMIL 40 MG/1
40 TABLET ORAL DAILY
Qty: 90 TABLET | Refills: 1 | Status: SHIPPED | OUTPATIENT
Start: 2025-02-07

## 2025-02-09 ENCOUNTER — PATIENT MESSAGE (OUTPATIENT)
Dept: FAMILY MEDICINE CLINIC | Facility: CLINIC | Age: 75
End: 2025-02-09

## 2025-02-09 DIAGNOSIS — E11.9 TYPE 2 DIABETES MELLITUS WITHOUT COMPLICATION, WITHOUT LONG-TERM CURRENT USE OF INSULIN (HCC): Primary | ICD-10-CM

## 2025-02-10 RX ORDER — TIRZEPATIDE 12.5 MG/.5ML
12.5 INJECTION, SOLUTION SUBCUTANEOUS WEEKLY
Qty: 2 ML | Refills: 2 | Status: SHIPPED | OUTPATIENT
Start: 2025-02-10

## 2025-02-12 ENCOUNTER — OFFICE VISIT (OUTPATIENT)
Dept: CARDIOLOGY CLINIC | Facility: CLINIC | Age: 75
End: 2025-02-12
Payer: MEDICARE

## 2025-02-12 VITALS
OXYGEN SATURATION: 98 % | BODY MASS INDEX: 30.07 KG/M2 | HEIGHT: 72 IN | SYSTOLIC BLOOD PRESSURE: 136 MMHG | WEIGHT: 222 LBS | DIASTOLIC BLOOD PRESSURE: 70 MMHG | HEART RATE: 65 BPM

## 2025-02-12 DIAGNOSIS — I48.0 PAROXYSMAL ATRIAL FIBRILLATION (HCC): ICD-10-CM

## 2025-02-12 DIAGNOSIS — I25.10 CORONARY ARTERY DISEASE INVOLVING NATIVE CORONARY ARTERY WITHOUT ANGINA PECTORIS: Primary | ICD-10-CM

## 2025-02-12 DIAGNOSIS — E78.2 MIXED HYPERLIPIDEMIA: ICD-10-CM

## 2025-02-12 DIAGNOSIS — I47.10 SVT (SUPRAVENTRICULAR TACHYCARDIA) (HCC): ICD-10-CM

## 2025-02-12 DIAGNOSIS — I10 HTN (HYPERTENSION): ICD-10-CM

## 2025-02-12 DIAGNOSIS — I49.3 PVC (PREMATURE VENTRICULAR CONTRACTION): ICD-10-CM

## 2025-02-12 PROCEDURE — 99215 OFFICE O/P EST HI 40 MIN: CPT | Performed by: INTERNAL MEDICINE

## 2025-02-12 NOTE — PROGRESS NOTES
Cardiology Follow Up    Cam Wilkins  1950  [id number]  [facility] CARDIAC CATH LAB  [address] [facility] [address]  [phone]    1. Coronary artery disease involving native coronary artery without angina pectoris        2. Paroxysmal atrial fibrillation (HCC)        3. HTN (hypertension)        4. Mixed hyperlipidemia        5. SVT (supraventricular tachycardia) ([facility])            Interval History: This is the first visit to me for this 74-year-old gentleman.  He has a history of coronary artery disease with stenting of his right coronary artery in 2011 and more recently in February 2024 he had 2 stents in his left circumflex coronary artery and another stent in his right coronary artery.  H his symptoms were dyspnea on exertion which are not currently present.  He has a history of PVCs.  He states that for the past 4 months he has had no significant palpitations.  He does drink an extensive amount of caffeine.  He denies chest pain orthopnea paroxysmal nocturnal dyspnea or syncope.    Patient Active Problem List   Diagnosis    Benign prostatic hyperplasia with nocturia    Cellulitis of left lower extremity    Charcot foot due to diabetes mellitus (HCC)    HTN (hypertension)    HLD (hyperlipidemia)    GERD (gastroesophageal reflux disease)    Gout involving toe of left foot    Type 2 diabetes mellitus without complication (HCC)    Mild intermittent asthma without complication    Coronary artery disease involving native coronary artery without angina pectoris    CLL (chronic lymphocytic leukemia) (HCC)    Paroxysmal atrial fibrillation (HCC)    Status post insertion of drug eluting coronary artery stent    Bradycardia    Hypomagnesemia    PVC (premature ventricular contraction)    SVT (supraventricular tachycardia) ([facility])     Past Medical History:   Diagnosis Date    Asthma     BPH (benign prostatic hyperplasia)     DM (diabetes  mellitus), type 2 (HCC)     Hearing loss     Hypertension     Nocturia      Social History     Socioeconomic History    Marital status: /Civil Union     Spouse name: Not on file    Number of children: Not on file    Years of education: Not on file    Highest education level: Not on file   Occupational History    Not on file   Tobacco Use    Smoking status: Never    Smokeless tobacco: Never   Vaping Use    Vaping status: Never Used   Substance and Sexual Activity    Alcohol use: Yes     Alcohol/week: 1.0 standard drink of alcohol     Types: 1 Cans of beer per week     Comment: social    Drug use: No    Sexual activity: Not on file   Other Topics Concern    Not on file   Social History Narrative    Not on file     Social Drivers of Health     Financial Resource Strain: Not on file   Food Insecurity: Patient Declined (5/29/2024)    Nursing - Inadequate Food Risk Classification     Worried About Running Out of Food in the Last Year: Patient declined     Ran Out of Food in the Last Year: Patient declined     Ran Out of Food in the Last Year: Not on file   Transportation Needs: Patient Declined (5/29/2024)    PRAPARE - Transportation     Lack of Transportation (Medical): Patient declined     Lack of Transportation (Non-Medical): Patient declined   Physical Activity: Not on file   Stress: Not on file   Social Connections: Not on file   Intimate Partner Violence: Not on file   Housing Stability: Patient Declined (5/29/2024)    Housing Stability Vital Sign     Unable to Pay for Housing in the Last Year: Patient declined     Number of Times Moved in the Last Year: 0     Homeless in the Last Year: Patient declined      Family History   Problem Relation Age of Onset    Cancer Mother     Heart failure Father      Past Surgical History:   Procedure Laterality Date    CARDIAC CATHETERIZATION Left 2/27/2024    Procedure: Cardiac Left Heart Cath;  Surgeon: Jimbo Briggs MD;  Location: [facility];  Service: Cardiology     CARDIAC CATHETERIZATION N/A 2/27/2024    Procedure: Cardiac pci;  Surgeon: Jimbo Briggs MD;  Location: [facility];  Service: Cardiology    CORONARY ANGIOPLASTY WITH STENT PLACEMENT N/A 2013    EYE SURGERY      KNEE SURGERY      TONSILLECTOMY         Current Outpatient Medications:     amLODIPine (NORVASC) 2.5 mg tablet, TAKE 1 TABLET BY MOUTH DAILY, Disp: 90 tablet, Rfl: 1    atorvastatin (LIPITOR) 80 mg tablet, Take 1 tablet (80 mg total) by mouth daily, Disp: 90 tablet, Rfl: 3    Cholecalciferol (Vitamin D3) 50 MCG (2000 UT) capsule, Take 2,000 Units by mouth daily, Disp: , Rfl:     clopidogrel (PLAVIX) 75 mg tablet, Take 1 tablet (75 mg total) by mouth daily, Disp: 90 tablet, Rfl: 3    Eliquis 5 MG, Take 1 tablet (5 mg total) by mouth 2 (two) times a day, Disp: 90 tablet, Rfl: 4    Fluticasone-Salmeterol (Advair Diskus) 250-50 mcg/dose inhaler, Inhale 1 puff 2 (two) times a day, Disp: 60 blister, Rfl: 2    glucose blood test strip, Use 1 each 3 (three) times a day, Disp: , Rfl:     Ipratropium-Albuterol (COMBIVENT IN), Take 1 puff by mouth in the morning, Disp: , Rfl:     Magnesium Oxide, Elemental, 400 MG TABS, Take 400 mg by mouth 2 (two) times a day, Disp: 180 tablet, Rfl: 0    metFORMIN (GLUCOPHAGE) 500 mg tablet, Take 1 tablet (500 mg total) by mouth daily with breakfast, Disp: , Rfl:     metoprolol succinate (TOPROL-XL) 50 mg 24 hr tablet, Take 50 mg by mouth 2 (two) times a day, Disp: , Rfl:     multivitamin-minerals (CENTRUM) tablet, Take 1 tablet by mouth daily, Disp: , Rfl:     olmesartan (BENICAR) 40 mg tablet, TAKE 1 TABLET BY MOUTH DAILY, Disp: 90 tablet, Rfl: 1    pantoprazole (PROTONIX) 40 mg tablet, Take 1 tablet by mouth in the morning, Disp: , Rfl:     Tirzepatide (Mounjaro) 12.5 MG/0.5ML SOAJ, Inject 12.5 mg under the skin once a week, Disp: 2 mL, Rfl: 2    vitamin B-12 (CYANOCOBALAMIN) 50 MCG TABS, Place 1 tablet under the tongue daily, Disp: , Rfl:     Zinc 10 MG LOZG, Take by  mouth, Disp: , Rfl:     famotidine (PEPCID) 20 mg tablet, Take 1 tablet (20 mg total) by mouth daily at bedtime (Patient not taking: Reported on 2/12/2025), Disp: 30 tablet, Rfl: 0  Allergies   Allergen Reactions    Celecoxib Palpitations    Clarithromycin     Rofecoxib Palpitations and Other (See Comments)     Heart palpations.     Other reaction(s): Erythema    All salmon inhibitors.       Labs:  No visits with results within 2 Month(s) from this visit.   Latest known visit with results is:   Office Visit on 12/05/2024   Component Date Value    Hemoglobin A1C 12/05/2024 5.3      Imaging: No results found.    Review of Systems:  Review of Systems   Constitutional:  Negative for chills and fever.   HENT:  Negative for ear pain and sore throat.    Eyes:  Negative for pain and visual disturbance.   Respiratory:  Negative for cough and shortness of breath.    Cardiovascular:  Negative for chest pain and palpitations.   Gastrointestinal:  Negative for abdominal pain and vomiting.   Genitourinary:  Negative for dysuria and hematuria.   Musculoskeletal:  Negative for arthralgias and back pain.   Skin:  Negative for color change and rash.   Neurological:  Negative for seizures and syncope.   All other systems reviewed and are negative.      Physical Exam:  Physical Exam  Vitals and nursing note reviewed.   Constitutional:       Appearance: Normal appearance.   HENT:      Head: Normocephalic and atraumatic.      Nose: Nose normal.      Mouth/Throat:      Mouth: Mucous membranes are moist.   Eyes:      Conjunctiva/sclera: Conjunctivae normal.   Cardiovascular:      Rate and Rhythm: Normal rate and regular rhythm.   Pulmonary:      Effort: Pulmonary effort is normal.      Breath sounds: Normal breath sounds.   Abdominal:      Palpations: Abdomen is soft.   Musculoskeletal:         General: Normal range of motion.      Cervical back: Normal range of motion.   Skin:     General: Skin is warm and dry.   Neurological:      General:  No focal deficit present.      Mental Status: He is alert and oriented to person, place, and time.   Psychiatric:         Mood and Affect: Mood normal.         Discussion/Summary: History of coronary artery disease as noted above with most recent stents placed in February 2024.  He is currently asymptomatic and functional class I.  He is maintained on Eliquis and Plavix.  The Eliquis is for his paroxysmal atrial fibrillation.  I told him at this point he can discontinue Plavix and take aspirin 162 mg daily for his coronary artery disease.  He is diabetic and has recently lost 30 pounds since being placed on a GLP-1 inhibitor.  Blood pressure is controlled on amlodipine and metoprolol.  He is on a high dose statin.    We reviewed his event recorder findings.  He had 6.4% of his beats his PVCs 4.1% of supraventricular ectopic.  He had some runs of SVT none over 4 minutes.  He had no atrial fibrillation while being monitored.  He told me his currently been asymptomatic for the past 4 months.  I told him that based on his left ventricular function and symptom status it is reasonable to continue with beta-blockade.  I did tell him that reducing caffeine may list less in his ectopy.  We will continue to monitor him and if symptoms recur consider consultation with EPS regarding his SVT.  I will see him again in 6 months.

## 2025-04-10 DIAGNOSIS — E83.42 HYPOMAGNESEMIA: ICD-10-CM

## 2025-04-10 DIAGNOSIS — I25.10 CORONARY ARTERY DISEASE INVOLVING NATIVE CORONARY ARTERY WITHOUT ANGINA PECTORIS, UNSPECIFIED WHETHER NATIVE OR TRANSPLANTED HEART: ICD-10-CM

## 2025-04-10 DIAGNOSIS — I48.0 PAROXYSMAL ATRIAL FIBRILLATION (HCC): ICD-10-CM

## 2025-04-10 DIAGNOSIS — I10 PRIMARY HYPERTENSION: Primary | ICD-10-CM

## 2025-04-10 DIAGNOSIS — I47.10 SVT (SUPRAVENTRICULAR TACHYCARDIA) (HCC): ICD-10-CM

## 2025-04-11 DIAGNOSIS — Z95.5 STATUS POST INSERTION OF DRUG ELUTING CORONARY ARTERY STENT: ICD-10-CM

## 2025-04-11 RX ORDER — METOPROLOL SUCCINATE 50 MG/1
50 TABLET, EXTENDED RELEASE ORAL 2 TIMES DAILY
Qty: 180 TABLET | Refills: 0 | Status: SHIPPED | OUTPATIENT
Start: 2025-04-11

## 2025-04-11 RX ORDER — ATORVASTATIN CALCIUM 80 MG/1
80 TABLET, FILM COATED ORAL DAILY
Qty: 90 TABLET | Refills: 1 | Status: SHIPPED | OUTPATIENT
Start: 2025-04-11

## 2025-04-11 NOTE — TELEPHONE ENCOUNTER
Pt reports he is in [place] and fell on the side walk and has some black and blue on one side w pain. I advised him to have it looked at in Urgent Care.

## 2025-04-11 NOTE — TELEPHONE ENCOUNTER
Reason for call:   [x] Refill   [] Prior Auth  [] Other:     Office:   [] PCP/Provider -   [x] Specialty/Provider -     Medication: atorvastatin (LIPITOR) 80 mg tablet    Dose/Frequency: Sig: Take 1 tablet (80 mg total) by mouth daily      Quantity: 90    Pharmacy:   [facility] [address] [address]  Phone: [phone]  Fax: [phone]    Local Pharmacy   Does the patient have enough for 3 days?   [] Yes   [x] No - Send as HP to POD    Mail Away Pharmacy   Does the patient have enough for 10 days?   [] Yes   [] No - Send as HP to POD

## 2025-04-11 NOTE — TELEPHONE ENCOUNTER
It does not look like Dr. Lewis ever prescribed this. Please clarify with patient who the usual prescriber is.

## 2025-04-11 NOTE — TELEPHONE ENCOUNTER
Refill must be reviewed and completed by the office or provider. The refill is unable to be approved or denied by the medication management team.    Magnesium - no protocol attached - Please review to see if the refill is appropriate.

## 2025-05-14 DIAGNOSIS — E11.9 TYPE 2 DIABETES MELLITUS WITHOUT COMPLICATION, WITHOUT LONG-TERM CURRENT USE OF INSULIN (HCC): ICD-10-CM

## 2025-05-14 RX ORDER — TIRZEPATIDE 12.5 MG/.5ML
12.5 INJECTION, SOLUTION SUBCUTANEOUS WEEKLY
Qty: 2 ML | Refills: 0 | Status: SHIPPED | OUTPATIENT
Start: 2025-05-14 | End: 2025-05-19 | Stop reason: SDUPTHER

## 2025-05-14 NOTE — TELEPHONE ENCOUNTER
Reason for call:   [x] Refill   [] Prior Auth  [] Other:     Office:   [x] PCP/Provider - [facility]   [] Specialty/Provider -     Medication:     Tirzepatide (Mounjaro) 12.5 MG/0.5ML SOAJ       Dose/Frequency: 12.5 mg, Weekly     Quantity: 2 mL    Pharmacy: [id number]    Local Pharmacy   Does the patient have enough for 3 days?   [] Yes   [] No - Send as HP to POD    Mail Away Pharmacy   Does the patient have enough for 10 days?   [] Yes   [] No - Send as HP to POD  How are you tolerating the medication?   [] Nausea  [] Vomiting  [] Diarrhea  [x] Asymptomatic  [] Other:    Last visit weight: not sure     Current weight: not sure     Date of last injection: 5/11/25    How many injections do you have left: 1

## 2025-05-19 ENCOUNTER — OFFICE VISIT (OUTPATIENT)
Dept: FAMILY MEDICINE CLINIC | Facility: CLINIC | Age: 75
End: 2025-05-19
Payer: MEDICARE

## 2025-05-19 VITALS
WEIGHT: 212 LBS | BODY MASS INDEX: 28.75 KG/M2 | HEART RATE: 80 BPM | SYSTOLIC BLOOD PRESSURE: 130 MMHG | DIASTOLIC BLOOD PRESSURE: 70 MMHG | OXYGEN SATURATION: 97 % | TEMPERATURE: 97 F

## 2025-05-19 DIAGNOSIS — C91.10 CLL (CHRONIC LYMPHOCYTIC LEUKEMIA) (HCC): ICD-10-CM

## 2025-05-19 DIAGNOSIS — E11.610 CHARCOT FOOT DUE TO DIABETES MELLITUS (HCC): ICD-10-CM

## 2025-05-19 DIAGNOSIS — E11.9 TYPE 2 DIABETES MELLITUS WITHOUT COMPLICATION, WITHOUT LONG-TERM CURRENT USE OF INSULIN (HCC): ICD-10-CM

## 2025-05-19 DIAGNOSIS — I10 PRIMARY HYPERTENSION: Primary | ICD-10-CM

## 2025-05-19 PROCEDURE — G2211 COMPLEX E/M VISIT ADD ON: HCPCS | Performed by: FAMILY MEDICINE

## 2025-05-19 PROCEDURE — 99214 OFFICE O/P EST MOD 30 MIN: CPT | Performed by: FAMILY MEDICINE

## 2025-05-19 RX ORDER — TIRZEPATIDE 12.5 MG/.5ML
12.5 INJECTION, SOLUTION SUBCUTANEOUS WEEKLY
Qty: 2 ML | Refills: 5 | Status: SHIPPED | OUTPATIENT
Start: 2025-05-19 | End: 2025-05-19 | Stop reason: SDUPTHER

## 2025-05-19 RX ORDER — TIRZEPATIDE 12.5 MG/.5ML
12.5 INJECTION, SOLUTION SUBCUTANEOUS WEEKLY
Qty: 2 ML | Refills: 0 | Status: SHIPPED | OUTPATIENT
Start: 2025-05-19

## 2025-05-19 NOTE — PROGRESS NOTES
Name: Cam Wilkins      : 1950      MRN: [id number]  Encounter Provider: Tremayne Lewis MD  Encounter Date: 2025   Encounter department: [facility]  :  Assessment & Plan  Type 2 diabetes mellitus without complication, without long-term current use of insulin (HCC)    Lab Results   Component Value Date    HGBA1C 5.3 2024     Continue on mounjaro at same dose, continues to lose weight.    Orders:    Tirzepatide (Mounjaro) 12.5 MG/0.5ML SOAJ; Inject 12.5 mg under the skin once a week    CLL (chronic lymphocytic leukemia) (HCC)    Following with oncology at [facility] due to diabetes mellitus (HCC)    Lab Results   Component Value Date    HGBA1C 5.3 2024            Primary hypertension    Well controlled on current regimen              History of Present Illness   HPI    74 year old male presenting in follow up of htn and diabetes, originally made appointment for sore throat but this resolved prior to visit.    He has been feeling well, notes some increased appetite latlely get hungry espeically at night, has continued to lose weight on mounjaro.      Review of Systems   Constitutional:  Negative for chills and fever.   HENT:  Negative for ear pain and sore throat.    Eyes:  Negative for pain and visual disturbance.   Respiratory:  Negative for cough and shortness of breath.    Cardiovascular:  Negative for chest pain and palpitations.   Gastrointestinal:  Negative for abdominal pain and vomiting.   Genitourinary:  Negative for dysuria and hematuria.   Musculoskeletal:  Negative for arthralgias and back pain.   Skin:  Negative for color change and rash.   Neurological:  Negative for seizures and syncope.   All other systems reviewed and are negative.      Objective   /70   Pulse 80   Temp (!) 97 °F (36.1 °C)   Wt 96.2 kg (212 lb)   SpO2 97%   BMI 28.75 kg/m²      Physical Exam  Vitals and nursing note reviewed.    Constitutional:       General: He is not in acute distress.     Appearance: He is well-developed.   HENT:      Head: Normocephalic and atraumatic.     Eyes:      Conjunctiva/sclera: Conjunctivae normal.       Cardiovascular:      Rate and Rhythm: Normal rate and regular rhythm.      Heart sounds: No murmur heard.  Pulmonary:      Effort: Pulmonary effort is normal. No respiratory distress.      Breath sounds: Normal breath sounds.   Abdominal:      Palpations: Abdomen is soft.      Tenderness: There is no abdominal tenderness.     Musculoskeletal:         General: No swelling.      Cervical back: Neck supple.     Skin:     General: Skin is warm and dry.      Capillary Refill: Capillary refill takes less than 2 seconds.     Neurological:      Mental Status: He is alert.     Psychiatric:         Mood and Affect: Mood normal.

## 2025-05-19 NOTE — ASSESSMENT & PLAN NOTE
Lab Results   Component Value Date    HGBA1C 5.3 12/05/2024     Continue on mounjaro at same dose, continues to lose weight.    Orders:    Tirzepatide (Mounjaro) 12.5 MG/0.5ML SOAJ; Inject 12.5 mg under the skin once a week

## 2025-06-02 DIAGNOSIS — I48.0 PAROXYSMAL ATRIAL FIBRILLATION (HCC): ICD-10-CM

## 2025-06-02 RX ORDER — APIXABAN 5 MG/1
5 TABLET, FILM COATED ORAL 2 TIMES DAILY
Qty: 180 TABLET | Refills: 1 | Status: SHIPPED | OUTPATIENT
Start: 2025-06-02

## 2025-06-02 NOTE — TELEPHONE ENCOUNTER
Reason for call:   [x] Refill   [] Prior Auth  [] Other:     Office:   [] PCP/Provider -   [x] Specialty/Provider - [place] [facility] [place]    Medication:     Eliquis 5 MG 5 mg, 2 times daily         Pharmacy: [address] [facility] [address] [phone]     Local Pharmacy   Does the patient have enough for 3 days?   [] Yes   [x] No - Send as HP to POD    Mail Away Pharmacy   Does the patient have enough for 10 days?   [] Yes   [] No - Send as HP to POD

## 2025-06-12 DIAGNOSIS — I25.10 CORONARY ARTERY DISEASE INVOLVING NATIVE CORONARY ARTERY WITHOUT ANGINA PECTORIS, UNSPECIFIED WHETHER NATIVE OR TRANSPLANTED HEART: ICD-10-CM

## 2025-06-12 DIAGNOSIS — I10 PRIMARY HYPERTENSION: ICD-10-CM

## 2025-06-12 DIAGNOSIS — I48.0 PAROXYSMAL ATRIAL FIBRILLATION (HCC): ICD-10-CM

## 2025-06-12 DIAGNOSIS — I47.10 SVT (SUPRAVENTRICULAR TACHYCARDIA) (HCC): ICD-10-CM

## 2025-06-12 RX ORDER — METOPROLOL SUCCINATE 50 MG/1
50 TABLET, EXTENDED RELEASE ORAL 2 TIMES DAILY
Qty: 180 TABLET | Refills: 1 | Status: SHIPPED | OUTPATIENT
Start: 2025-06-12

## 2025-06-17 DIAGNOSIS — E11.9 TYPE 2 DIABETES MELLITUS WITHOUT COMPLICATION, WITHOUT LONG-TERM CURRENT USE OF INSULIN (HCC): ICD-10-CM

## 2025-06-17 RX ORDER — TIRZEPATIDE 12.5 MG/.5ML
12.5 INJECTION, SOLUTION SUBCUTANEOUS WEEKLY
Qty: 2 ML | Refills: 0 | Status: SHIPPED | OUTPATIENT
Start: 2025-06-17

## 2025-06-17 NOTE — TELEPHONE ENCOUNTER
Patient is requesting refill until his appt on 8/13/25    Reason for call:   [x] Refill   [] Prior Auth  [] Other:     Office:   [x] PCP/Provider -   [] Specialty/Provider -     Medication: Tirzepatide (Mounjaro) 12.5 MG/0.5ML SOAJ     Dose/Frequency: 12.5 mg, Subcutaneous, Weekly     Quantity: 2 ml    Pharmacy: [address] [facility]   Does the patient have enough for 3 days?   [x] Yes   [] No - Send as HP to POD    Mail Away Pharmacy   Does the patient have enough for 10 days?   [] Yes   [] No - Send as HP to POD

## 2025-06-17 NOTE — TELEPHONE ENCOUNTER
Patient needs updated blood work and has previously placed orders. Please contact patient to go for labs. Courtesy refill provided.     HBA1C

## 2025-07-15 DIAGNOSIS — E11.9 TYPE 2 DIABETES MELLITUS WITHOUT COMPLICATION, WITHOUT LONG-TERM CURRENT USE OF INSULIN (HCC): ICD-10-CM

## 2025-07-17 RX ORDER — TIRZEPATIDE 12.5 MG/.5ML
12.5 INJECTION, SOLUTION SUBCUTANEOUS WEEKLY
Qty: 2 ML | Refills: 0 | Status: SHIPPED | OUTPATIENT
Start: 2025-07-17

## 2025-07-21 DIAGNOSIS — I47.10 SVT (SUPRAVENTRICULAR TACHYCARDIA) (HCC): ICD-10-CM

## 2025-07-21 DIAGNOSIS — I48.0 PAROXYSMAL ATRIAL FIBRILLATION (HCC): ICD-10-CM

## 2025-07-21 DIAGNOSIS — I10 PRIMARY HYPERTENSION: ICD-10-CM

## 2025-07-21 DIAGNOSIS — I25.10 CORONARY ARTERY DISEASE INVOLVING NATIVE CORONARY ARTERY WITHOUT ANGINA PECTORIS, UNSPECIFIED WHETHER NATIVE OR TRANSPLANTED HEART: ICD-10-CM

## 2025-07-21 RX ORDER — METOPROLOL SUCCINATE 50 MG/1
50 TABLET, EXTENDED RELEASE ORAL 2 TIMES DAILY
Qty: 180 TABLET | Refills: 1 | Status: SHIPPED | OUTPATIENT
Start: 2025-07-21

## 2025-07-21 NOTE — TELEPHONE ENCOUNTER
- Pharmacy Change    Reason for call:   [x] Refill   [] Prior Auth  [] Other:     Office:   [x] PCP/Provider -   [] Specialty/Provider -     Medication:   - Metoprolol 50 mg- take 1 tablet by mouth twice daily      Pharmacy: [facility] [address] [place]    Local Pharmacy   Does the patient have enough for 3 days?   [x] Yes   [] No - Send as HP to POD    Mail Away Pharmacy   Does the patient have enough for 10 days?   [] Yes   [] No - Send as HP to POD

## 2025-07-31 ENCOUNTER — TELEPHONE (OUTPATIENT)
Age: 75
End: 2025-07-31

## 2025-08-05 ENCOUNTER — APPOINTMENT (OUTPATIENT)
Dept: LAB | Facility: MEDICAL CENTER | Age: 75
End: 2025-08-05
Attending: UROLOGY
Payer: MEDICARE

## 2025-08-05 ENCOUNTER — HOSPITAL ENCOUNTER (OUTPATIENT)
Dept: NON INVASIVE DIAGNOSTICS | Facility: HOSPITAL | Age: 75
Discharge: HOME | End: 2025-08-05
Attending: FAMILY MEDICINE
Payer: MEDICARE

## 2025-08-05 ENCOUNTER — OFFICE VISIT (OUTPATIENT)
Dept: FAMILY MEDICINE CLINIC | Facility: CLINIC | Age: 75
End: 2025-08-05
Payer: MEDICARE

## 2025-08-05 VITALS
BODY MASS INDEX: 30.62 KG/M2 | WEIGHT: 225.8 LBS | HEART RATE: 55 BPM | OXYGEN SATURATION: 94 % | SYSTOLIC BLOOD PRESSURE: 136 MMHG | DIASTOLIC BLOOD PRESSURE: 80 MMHG | TEMPERATURE: 96 F

## 2025-08-05 DIAGNOSIS — T14.8XXA HEMATOMA: ICD-10-CM

## 2025-08-05 DIAGNOSIS — R60.0 BILATERAL LOWER EXTREMITY EDEMA: Primary | ICD-10-CM

## 2025-08-05 DIAGNOSIS — N40.1 BPH WITH URINARY OBSTRUCTION: ICD-10-CM

## 2025-08-05 DIAGNOSIS — N13.8 BPH WITH URINARY OBSTRUCTION: ICD-10-CM

## 2025-08-05 DIAGNOSIS — R60.0 BILATERAL LOWER EXTREMITY EDEMA: ICD-10-CM

## 2025-08-05 DIAGNOSIS — Z91.81 HISTORY OF FALLING: ICD-10-CM

## 2025-08-05 DIAGNOSIS — E11.9 TYPE 2 DIABETES MELLITUS WITHOUT COMPLICATION, WITHOUT LONG-TERM CURRENT USE OF INSULIN (HCC): ICD-10-CM

## 2025-08-05 LAB
PSA SERPL-MCNC: 1.04 NG/ML (ref 0–4)
SL AMB POCT HEMOGLOBIN AIC: 5 (ref ?–6.5)

## 2025-08-05 PROCEDURE — 99214 OFFICE O/P EST MOD 30 MIN: CPT | Performed by: FAMILY MEDICINE

## 2025-08-05 PROCEDURE — G2211 COMPLEX E/M VISIT ADD ON: HCPCS | Performed by: FAMILY MEDICINE

## 2025-08-05 PROCEDURE — 93970 EXTREMITY STUDY: CPT

## 2025-08-05 PROCEDURE — 36415 COLL VENOUS BLD VENIPUNCTURE: CPT

## 2025-08-05 PROCEDURE — 83036 HEMOGLOBIN GLYCOSYLATED A1C: CPT | Performed by: FAMILY MEDICINE

## 2025-08-05 PROCEDURE — 84153 ASSAY OF PSA TOTAL: CPT

## 2025-08-06 ENCOUNTER — TELEPHONE (OUTPATIENT)
Dept: FAMILY MEDICINE CLINIC | Facility: CLINIC | Age: 75
End: 2025-08-06

## 2025-08-06 ENCOUNTER — APPOINTMENT (OUTPATIENT)
Dept: LAB | Facility: MEDICAL CENTER | Age: 75
End: 2025-08-06
Payer: MEDICARE

## 2025-08-06 DIAGNOSIS — T14.8XXA HEMATOMA: Primary | ICD-10-CM

## 2025-08-06 PROCEDURE — 93970 EXTREMITY STUDY: CPT | Performed by: SURGERY

## 2025-08-13 ENCOUNTER — OFFICE VISIT (OUTPATIENT)
Dept: FAMILY MEDICINE CLINIC | Facility: CLINIC | Age: 75
End: 2025-08-13
Payer: MEDICARE

## 2025-08-20 ENCOUNTER — OFFICE VISIT (OUTPATIENT)
Dept: CARDIOLOGY CLINIC | Facility: CLINIC | Age: 75
End: 2025-08-20
Payer: MEDICARE

## 2025-08-20 VITALS
DIASTOLIC BLOOD PRESSURE: 70 MMHG | HEIGHT: 72 IN | BODY MASS INDEX: 28.99 KG/M2 | HEART RATE: 58 BPM | SYSTOLIC BLOOD PRESSURE: 156 MMHG | WEIGHT: 214 LBS

## 2025-08-20 DIAGNOSIS — I47.10 SVT (SUPRAVENTRICULAR TACHYCARDIA) (HCC): ICD-10-CM

## 2025-08-20 DIAGNOSIS — I10 PRIMARY HYPERTENSION: ICD-10-CM

## 2025-08-20 DIAGNOSIS — I48.0 PAROXYSMAL ATRIAL FIBRILLATION (HCC): ICD-10-CM

## 2025-08-20 DIAGNOSIS — I49.3 PVC (PREMATURE VENTRICULAR CONTRACTION): ICD-10-CM

## 2025-08-20 DIAGNOSIS — E78.2 MIXED HYPERLIPIDEMIA: ICD-10-CM

## 2025-08-20 DIAGNOSIS — I25.10 CORONARY ARTERY DISEASE INVOLVING NATIVE CORONARY ARTERY OF NATIVE HEART WITHOUT ANGINA PECTORIS: Primary | ICD-10-CM

## 2025-08-20 PROCEDURE — 99214 OFFICE O/P EST MOD 30 MIN: CPT | Performed by: INTERNAL MEDICINE

## 2025-08-21 DIAGNOSIS — I48.0 PAROXYSMAL ATRIAL FIBRILLATION (HCC): ICD-10-CM

## 2025-08-21 DIAGNOSIS — K21.9 GASTROESOPHAGEAL REFLUX DISEASE WITHOUT ESOPHAGITIS: Primary | ICD-10-CM

## 2025-08-21 DIAGNOSIS — I10 HYPERTENSION, UNSPECIFIED TYPE: Primary | ICD-10-CM

## 2025-08-21 DIAGNOSIS — I25.10 CORONARY ARTERY DISEASE INVOLVING NATIVE CORONARY ARTERY WITHOUT ANGINA PECTORIS, UNSPECIFIED WHETHER NATIVE OR TRANSPLANTED HEART: ICD-10-CM

## 2025-08-21 PROCEDURE — 93000 ELECTROCARDIOGRAM COMPLETE: CPT | Performed by: INTERNAL MEDICINE

## 2025-08-22 ENCOUNTER — OFFICE VISIT (OUTPATIENT)
Dept: UROLOGY | Facility: MEDICAL CENTER | Age: 75
End: 2025-08-22
Payer: MEDICARE

## 2025-08-22 VITALS
DIASTOLIC BLOOD PRESSURE: 64 MMHG | HEART RATE: 97 BPM | SYSTOLIC BLOOD PRESSURE: 126 MMHG | BODY MASS INDEX: 28.99 KG/M2 | WEIGHT: 214 LBS | OXYGEN SATURATION: 100 % | HEIGHT: 72 IN

## 2025-08-22 DIAGNOSIS — N40.1 BENIGN PROSTATIC HYPERPLASIA WITH NOCTURIA: Primary | ICD-10-CM

## 2025-08-22 DIAGNOSIS — R35.1 BENIGN PROSTATIC HYPERPLASIA WITH NOCTURIA: Primary | ICD-10-CM

## 2025-08-22 DIAGNOSIS — Z12.5 SCREENING FOR PROSTATE CANCER: ICD-10-CM

## 2025-08-22 PROCEDURE — 99213 OFFICE O/P EST LOW 20 MIN: CPT

## 2025-08-22 RX ORDER — AMLODIPINE BESYLATE 2.5 MG/1
2.5 TABLET ORAL DAILY
Qty: 90 TABLET | Refills: 1 | Status: SHIPPED | OUTPATIENT
Start: 2025-08-22

## 2025-08-22 RX ORDER — PANTOPRAZOLE SODIUM 40 MG/1
40 TABLET, DELAYED RELEASE ORAL DAILY
Qty: 90 TABLET | Refills: 1 | Status: SHIPPED | OUTPATIENT
Start: 2025-08-22

## 2025-08-22 RX ORDER — ASPIRIN 81 MG/1
162 TABLET, CHEWABLE ORAL ONCE
COMMUNITY

## 2025-08-22 RX ORDER — OLMESARTAN MEDOXOMIL 40 MG/1
40 TABLET ORAL DAILY
Qty: 90 TABLET | Refills: 1 | Status: SHIPPED | OUTPATIENT
Start: 2025-08-22

## (undated) DEVICE — NC TREK NEO™ CORONARY DILATATION CATHETER 4.00 MM X 20 MM / RAPID-EXCHANGE: Brand: NC TREK NEO™

## (undated) DEVICE — GLIDESHEATH BASIC HYDROPHILIC COATED INTRODUCER SHEATH: Brand: GLIDESHEATH

## (undated) DEVICE — CORONARY IMAGING CATHETER: Brand: OPTICROSS™ 6 HD

## (undated) DEVICE — RUNTHROUGH NS EXTRA FLOPPY PTCA GUIDEWIRE: Brand: RUNTHROUGH

## (undated) DEVICE — CATH GUIDE LAUNCHER 6FR EBU 3.5

## (undated) DEVICE — CATH GUIDE LAUNCHER 6FR AL 1.0

## (undated) DEVICE — Device: Brand: ASAHI SILVERWAY

## (undated) DEVICE — CATH DIAG 5FR IMPULSE 110CM 6S PIG 145 ANG

## (undated) DEVICE — DGW .035 FC J3MM 260CM TEF: Brand: EMERALD

## (undated) DEVICE — TREK CORONARY DILATATION CATHETER 3.0 MM X 20 MM / RAPID-EXCHANGE: Brand: TREK

## (undated) DEVICE — CATH GUIDE LAUNCHER 6FR JR 4.0

## (undated) DEVICE — NC TREK NEO™ CORONARY DILATATION CATHETER 3.00 MM X 20 MM / RAPID-EXCHANGE: Brand: NC TREK NEO™

## (undated) DEVICE — TR BAND RADIAL ARTERY COMPRESSION DEVICE: Brand: TR BAND

## (undated) DEVICE — CATH SHOCKWAVE C2 PLUS 3 X 12MM

## (undated) DEVICE — RADIFOCUS OPTITORQUE ANGIOGRAPHIC CATHETER: Brand: OPTITORQUE